# Patient Record
Sex: FEMALE | Race: WHITE | NOT HISPANIC OR LATINO | Employment: FULL TIME | URBAN - METROPOLITAN AREA
[De-identification: names, ages, dates, MRNs, and addresses within clinical notes are randomized per-mention and may not be internally consistent; named-entity substitution may affect disease eponyms.]

---

## 2017-01-03 ENCOUNTER — SLEEP STUDY (OUTPATIENT)
Dept: SLEEP MEDICINE | Facility: MEDICAL CENTER | Age: 47
End: 2017-01-03
Attending: NURSE PRACTITIONER
Payer: COMMERCIAL

## 2017-01-03 DIAGNOSIS — G47.31 CENTRAL SLEEP APNEA: ICD-10-CM

## 2017-01-03 DIAGNOSIS — G47.33 OSA (OBSTRUCTIVE SLEEP APNEA): ICD-10-CM

## 2017-01-03 PROCEDURE — 95811 POLYSOM 6/>YRS CPAP 4/> PARM: CPT | Performed by: INTERNAL MEDICINE

## 2017-01-03 NOTE — MR AVS SNAPSHOT
Fartun Mittal   1/3/2017 7:30 PM   Sleep Study   MRN: [id number]    Department:  [facility]   Dept Phone:  [phone]    Description:  Female : 1970   Provider:  SLEEP TECH           Allergies as of 1/3/2017     Allergen Noted Reactions    Asa [Aspirin] 2009   Anaphylaxis    Nsaids 2010   Anaphylaxis    Sulfa Drugs 2010   Anaphylaxis    Contrast Media With Iodine [Iodine] 2014   Itching      You were diagnosed with     CARI (obstructive sleep apnea)   [342661]       Central sleep apnea   [221914]         Vital Signs     Smoking Status                   Never Smoker            Basic Information     Date Of Birth Sex Race Ethnicity Preferred Language    1970 Female White Non- English      Your appointments     2017 10:40 AM   Established Patient Pul with KIMBERLY Jones   [facility] (--)    [address]  [address]  [facility] [id number] [phone]              Problem List              ICD-10-CM Priority Class Noted - Resolved    Anxiety F41.9   Unknown - Present    Ocular migraine G43.109   2012 - Present    Hyperlipidemia E78.5   2012 - Present    Menopause Z78.0   3/19/2014 - Present    GERD (gastroesophageal reflux disease) K21.9   3/19/2014 - Present    Aspirin-sensitive asthma with nasal polyps J45.909, J33.9, Z88.6   2016 - Present    Chronic sinusitis J32.9   2016 - Present    Apnea R06.81   2016 - Present    CARI (obstructive sleep apnea) G47.33   2016 - Present    Central sleep apnea G47.31   2016 - Present      Health Maintenance        Date Due Completion Dates    IMM PNEUMOCOCCAL 19-64 (ADULT) MEDIUM RISK SERIES (1 of 1 - PPSV23) 1988 ---    MAMMOGRAM 2015, 2013, 2012, 2011    PAP SMEAR 2017, 2011, 3/23/2010, 2009    IMM DTaP/Tdap/Td Vaccine (2 - Td) 2026            Current  Immunizations     Influenza TIV (IM) 11/12/2013    Influenza Vaccine Pediatric 11/18/2010 12:42 PM, 9/30/2009, 10/24/2008    Influenza Vaccine Quad Inj (Pf) 11/11/2016 11:36 AM    Tdap Vaccine 4/13/2016 12:01 PM    Tetanus Vaccine 2/18/1997      Below and/or attached are the medications your provider expects you to take. Review all of your home medications and newly ordered medications with your provider and/or pharmacist. Follow medication instructions as directed by your provider and/or pharmacist. Please keep your medication list with you and share with your provider. Update the information when medications are discontinued, doses are changed, or new medications (including over-the-counter products) are added; and carry medication information at all times in the event of emergency situations     Allergies:  ASA - Anaphylaxis     NSAIDS - Anaphylaxis     SULFA DRUGS - Anaphylaxis     CONTRAST MEDIA WITH IODINE - Itching               Medications  Valid as of: January 04, 2017 -  6:46 AM    Generic Name Brand Name Tablet Size Instructions for use    Atorvastatin Calcium (Tab) LIPITOR 20 MG Take 1 Tab by mouth every day.        EPINEPHrine HCl (Anaphylaxis) (Device) EPIPEN 0.3 MG/0.3ML (1:1000) 0.3 mL by Intramuscular route Once PRN for 1 dose.        Fluticasone Propionate (Suspension) FLONASE 50 MCG/ACT USE 1 SPRAYS IN EACH NOSTRIL DAILY-GENERIC FOR FLONASE (60 days)        Loratadine   Take  by mouth every day.        Montelukast Sodium (Tab) SINGULAIR 10 MG Take 1 Tab by mouth every day.        Omeprazole (CAPSULE DELAYED RELEASE) PRILOSEC 20 MG Take 1 Cap by mouth every day.        Zolpidem Tartrate (Tab) AMBIEN 5 MG Take 1-2 tablets by mouth every evening as needed for insomnia. Bring to sleep study.        .                 Medicines prescribed today were sent to:     [id number] - [place] - [address] [facility]    [address] [facility] [place] [address]    Phone: [phone] Fax: [phone]    Open 24 Hours?:  No      Medication refill instructions:       If your prescription bottle indicates you have medication refills left, it is not necessary to call your provider’s office. Please contact your pharmacy and they will refill your medication.    If your prescription bottle indicates you do not have any refills left, you may request refills at any time through one of the following ways: The online Fittr system (except Urgent Care), by calling your provider’s office, or by asking your pharmacy to contact your provider’s office with a refill request. Medication refills are processed only during regular business hours and may not be available until the next business day. Your provider may request additional information or to have a follow-up visit with you prior to refilling your medication.   *Please Note: Medication refills are assigned a new Rx number when refilled electronically. Your pharmacy may indicate that no refills were authorized even though a new prescription for the same medication is available at the pharmacy. Please request the medicine by name with the pharmacy before contacting your provider for a refill.           Fittr Access Code: Activation code not generated  Current Fittr Status: Active

## 2017-01-04 NOTE — PROCEDURES
Clinical Comments:  The patient underwent a comprehensive polysomnogram using the standard montage for measurement of parameters of sleep, respiratory events, movement abnormalities, heart rate and rhythm. A microphone was used to monitor snoring.      ANALYSIS:  The total recording time was 481.8 minutes with a sleep period of 475.8 minutes and the total sleep time was 450.8 minutes with a sleep efficiency of 93.6%.  The sleep latency was 6.1 minutes, and REM latency was 102.0 minutes.  The patient experienced 72 arousals in total, for an arousal index of 9.6    RESPIRATORY: The patient had 104 apneas in total.  Of these, 4 were obstructive apneas, and 100 were central apneas.  This resulted in an apnea index (AI) of 13.8.  The patient had 115 hypopneas, for a hypopnea index of 15.3.  The overall AHI was 29.2, while the AHI during Stage R sleep was 21.3.  AHI while supine was 33.0.    OXIMETRY: Oxygen saturation monitoring showed a mean SpO2 of 93.2%, with a minimum oxygen saturation of 71.0%.  Oxygen saturations were less than or = 89% for 32.4 minutes of sleep time.    CARDIAC: The highest heart rate during the recording was 93.0 beats per minute.  The average heart rate during sleep was 61.8 bpm.    LIMB MOVEMENTS: There were a total of 0 PLMs during sleep, of which 0 were PLMs arousals.  This resulted in a PLMS index of 0.0.      Interpretation:    Ms. Mittal presented with symptoms suggesting sleep apnea hypopnea syndrome. Previous polysomnography demonstrated an apnea hypopnea index of 120.6 events per hour with a lowest arterial oxygen saturation of 78% on room air. There was an incomplete response to airway pressurization in that previous study. This study was decided to further calibrate therapy.    There is minimal fragmentation of sleep with a normal sleep efficiency but a modest increase in the arousal and awakening index. No periodic limb movements are identified. BiPAP was adjusted across a  pressure range of 11-16/6-8 cm water with persistence of hypopnea and central apnea events. Overall there were 100 episodes of central apnea, 4 obstructive apneas and 115 episodes of hypopnea. Therefore servo adaptive BiPAP ventilation was initiated with a minimum expiratory pressure range of 8-10 cm water and pressure support. In the final pressure stage, the apnea hypopnea index was 1.8 events per hour and the lowest arterial oxygen saturation was 90% on room air. That stage in the titration included 18 minutes of REM sleep time, 49 minutes of non-REM sleep time, and was done in the supine body position.    Assessment: This study demonstrates an excellent response to servo adaptive BiPAP ventilation in a patient with previously identified severe sleep apnea hypopnea including a prominent component of central apnea resistant to CPAP and BiPAP therapy.    Recommendations: ASV with a minimum expiratory pressure of 10 cm water and pressure support of 5-15 cm water. She did best with a small Air Fit F10 full facemask and heated humidification.

## 2017-01-11 ENCOUNTER — OFFICE VISIT (OUTPATIENT)
Dept: PULMONOLOGY | Facility: HOSPICE | Age: 47
End: 2017-01-11
Payer: COMMERCIAL

## 2017-01-11 VITALS
RESPIRATION RATE: 16 BRPM | DIASTOLIC BLOOD PRESSURE: 98 MMHG | HEART RATE: 81 BPM | HEIGHT: 67 IN | BODY MASS INDEX: 30.61 KG/M2 | SYSTOLIC BLOOD PRESSURE: 136 MMHG | WEIGHT: 195 LBS | TEMPERATURE: 98.1 F | OXYGEN SATURATION: 96 %

## 2017-01-11 DIAGNOSIS — G47.31 CENTRAL SLEEP APNEA: ICD-10-CM

## 2017-01-11 PROCEDURE — 99213 OFFICE O/P EST LOW 20 MIN: CPT | Performed by: NURSE PRACTITIONER

## 2017-01-11 NOTE — PATIENT INSTRUCTIONS
1) Start ASV at 10/15/5cmH20  2) Discussed acclimating to machine and change mask within first 30 days if required. Bring machine to first appointment.  3) Vaccines: Up to date with flu  4) Return in about 6 weeks (around 2/22/2017) for Compliance, review of symptoms, if not sooner, follow up with KIM De Anda.

## 2017-01-11 NOTE — MR AVS SNAPSHOT
"        Fartun Mittal   2017 10:40 AM   Office Visit   MRN: [id number]    Department:  [facility]   Dept Phone:  [phone]    Description:  Female : 1970   Provider:  KIMBERLY Jones           Reason for Visit     Apnea Last seen     Results Sleep Study 1/3/17       Allergies as of 2017     Allergen Noted Reactions    Asa [Aspirin] 2009   Anaphylaxis    Nsaids 2010   Anaphylaxis    Sulfa Drugs 2010   Anaphylaxis    Contrast Media With Iodine [Iodine] 2014   Itching      You were diagnosed with     Central sleep apnea   [188862]         Vital Signs     Blood Pressure Pulse Temperature Respirations Height Weight    136/98 mmHg 81 36.7 °C (98.1 °F) 16 1.702 m (5' 7\") 88.451 kg (195 lb)    Body Mass Index Oxygen Saturation Smoking Status             30.53 kg/m2 96% Never Smoker          Basic Information     Date Of Birth Sex Race Ethnicity Preferred Language    1970 Female White Non- English      Your appointments     2017  1:00 PM   Established Patient with JUDD Barnett   [facility] [place] ([address])    [address] [place] [address] [address]  [facility] [address] [phone]           You will be receiving a confirmation call a few days before your appointment from our automated call confirmation system.   Please bring to your appointment; a photo ID, copies of your insurance card, all medication bottles and any co-pay that you are responsible for.  Please allow 45-60 minutes to complete your scheduled appointment.              Problem List              ICD-10-CM Priority Class Noted - Resolved    Anxiety F41.9   Unknown - Present    Ocular migraine G43.109   2012 - Present    Hyperlipidemia E78.5   2012 - Present    Menopause Z78.0   3/19/2014 - Present    GERD (gastroesophageal reflux disease) K21.9   3/19/2014 - Present    Aspirin-sensitive asthma with nasal polyps J45.909, J33.9, Z88.6   " 8/18/2016 - Present    Chronic sinusitis J32.9   11/11/2016 - Present    Apnea R06.81   11/11/2016 - Present    CARI (obstructive sleep apnea) G47.33   12/8/2016 - Present    Central sleep apnea G47.31   12/8/2016 - Present      Health Maintenance        Date Due Completion Dates    IMM PNEUMOCOCCAL 19-64 (ADULT) MEDIUM RISK SERIES (1 of 1 - PPSV23) 1/26/1988 ---    MAMMOGRAM 8/8/2015 8/8/2014, 8/23/2013, 8/17/2012, 8/16/2011    PAP SMEAR 7/31/2017 7/31/2014, 4/29/2011, 3/23/2010, 8/19/2009    IMM DTaP/Tdap/Td Vaccine (2 - Td) 4/13/2026 4/13/2016            Current Immunizations     Influenza TIV (IM) 11/12/2013    Influenza Vaccine Pediatric 11/18/2010 12:42 PM, 9/30/2009, 10/24/2008    Influenza Vaccine Quad Inj (Pf) 11/11/2016 11:36 AM    Tdap Vaccine 4/13/2016 12:01 PM    Tetanus Vaccine 2/18/1997      Below and/or attached are the medications your provider expects you to take. Review all of your home medications and newly ordered medications with your provider and/or pharmacist. Follow medication instructions as directed by your provider and/or pharmacist. Please keep your medication list with you and share with your provider. Update the information when medications are discontinued, doses are changed, or new medications (including over-the-counter products) are added; and carry medication information at all times in the event of emergency situations     Allergies:  ASA - Anaphylaxis     NSAIDS - Anaphylaxis     SULFA DRUGS - Anaphylaxis     CONTRAST MEDIA WITH IODINE - Itching               Medications  Valid as of: January 11, 2017 - 11:04 AM    Generic Name Brand Name Tablet Size Instructions for use    Atorvastatin Calcium (Tab) LIPITOR 20 MG Take 1 Tab by mouth every day.        EPINEPHrine HCl (Anaphylaxis) (Device) EPIPEN 0.3 MG/0.3ML (1:1000) 0.3 mL by Intramuscular route Once PRN for 1 dose.        Fluticasone Propionate (Suspension) FLONASE 50 MCG/ACT USE 1 SPRAYS IN EACH NOSTRIL DAILY-GENERIC FOR  FLONASE (60 days)        Loratadine   Take  by mouth every day.        Montelukast Sodium (Tab) SINGULAIR 10 MG Take 1 Tab by mouth every day.        Omeprazole (CAPSULE DELAYED RELEASE) PRILOSEC 20 MG Take 1 Cap by mouth every day.        Zolpidem Tartrate (Tab) AMBIEN 5 MG Take 1-2 tablets by mouth every evening as needed for insomnia. Bring to sleep study.        .                 Medicines prescribed today were sent to:     [facility] [id number] [facility] [place] - [address] [facility]    [address] [facility] [place] [address]    Phone: [phone] Fax: [phone]    Open 24 Hours?: No      Medication refill instructions:       If your prescription bottle indicates you have medication refills left, it is not necessary to call your provider’s office. Please contact your pharmacy and they will refill your medication.    If your prescription bottle indicates you do not have any refills left, you may request refills at any time through one of the following ways: The online Solaiemes system (except Urgent Care), by calling your provider’s office, or by asking your pharmacy to contact your provider’s office with a refill request. Medication refills are processed only during regular business hours and may not be available until the next business day. Your provider may request additional information or to have a follow-up visit with you prior to refilling your medication.   *Please Note: Medication refills are assigned a new Rx number when refilled electronically. Your pharmacy may indicate that no refills were authorized even though a new prescription for the same medication is available at the pharmacy. Please request the medicine by name with the pharmacy before contacting your provider for a refill.        Instructions    1) Start ASV at 10/15/5cmH20  2) Discussed acclimating to machine and change mask within first 30 days if required. Bring machine to first appointment.  3) Vaccines: Up to date with flu  4) Return in about 6 weeks  (around 2/22/2017) for Compliance, review of symptoms, if not sooner, follow up with KIM De Anda.              MyChart Access Code: Activation code not generated  Current Scutumt Status: Active

## 2017-01-11 NOTE — PROGRESS NOTES
"CC:  Here for f/u sleep issues as listed below    HPI:   Fartun presents today for follow up obstructive sleep apnea, central sleep apnea and titration results.  PSG from 12/2016 indicated an AHI of 120.6 and low oxygen of 78%. During the treatment phase a total of 83 episodes of central apneas were noted. A titration sleep study and was completed 1-20 17. On BiPAP pressures she continued to have central apneas. She was switched to the ASV machine with success at a EPAP pressure of 10 with reduced AHI, full correction of low oxygen, and REM rebound. Patient felt remarkably more refreshed and less tired throughout the day after the sleep study. She did not wake up during the night that she typically does. She is very excited to get started on the machine as soon as possible.    Patient is currently sleeping 8 hours per night with every hour nighttime awakenings. Apparently she has been sleeping in a recliner since a friend that is a physician assistant slept over at her home not too long ago and discussed her concerns about sleep apnea. They have no trouble falling asleep.   They do not feel refreshed in the morning and deny morning H/A. They feel tired throughout the day, but do not nap. She has 3 jobs. She could potentially take 2-3 naps a day if she wanted though.  Patient reports loud snoring, apnea events and resuscitative orthopneic events. They get tired after driving for one hour.  She is a 5th . She will catch herself dozing off while reading. She feels physically well, but has hx of HTN and CAD \"for unknown reasons\". No fam hx of CAD.    Patient Active Problem List    Diagnosis Date Noted   • CARI (obstructive sleep apnea) 12/08/2016   • Central sleep apnea 12/08/2016   • Chronic sinusitis 11/11/2016   • Apnea 11/11/2016   • Aspirin-sensitive asthma with nasal polyps 08/18/2016   • Menopause 03/19/2014   • GERD (gastroesophageal reflux disease) 03/19/2014   • Ocular migraine 01/18/2012 "   • Hyperlipidemia 01/18/2012   • Anxiety        Past Medical History   Diagnosis Date   • Allergy    • Anxiety    • Tubal ligation    • Heart burn    • Snoring      sleep apnea questionairre completed   • Psychiatric problem      depression   • Allergic rhinitis    • Sleep apnea    • Chickenpox    • Influenza        Past Surgical History   Procedure Laterality Date   • Tonsillectomy  1981   • Appendectomy  3/2003   • Tubal coagulation laparoscopic bilateral  2007   • Abdominoplasty  2009   • Foot orif  1988     left   • Bunionectomy  5/18/2011     Performed by MOLLY GARCIA at [facility]   • Cheilectomy  5/18/2011     Performed by MOLLY GARCIA at [facility]   • Bone spur excision  5/18/2011     Performed by MOLLY GARCIA at [facility]   • Eswt  5/18/2011     Performed by MOLLY GARCIA at [facility]       Family History   Problem Relation Age of Onset   • Cancer Father      prostate   • Hypertension Father    • Arthritis Father    • Prostate cancer Father    • Cancer Sister      melanoma   • Cancer Paternal Aunt      breast   • Diabetes     • Sleep Apnea Neg Hx        Social History     Social History   • Marital Status:      Spouse Name: N/A   • Number of Children: N/A   • Years of Education: N/A     Occupational History   • Not on file.     Social History Main Topics   • Smoking status: Never Smoker    • Smokeless tobacco: Never Used   • Alcohol Use: 4.2 oz/week     7 Glasses of wine per week      Comment: once a day   • Drug Use: No   • Sexual Activity: Not on file      Comment: , 2 children, teacher     Other Topics Concern   • Not on file     Social History Narrative       Current Outpatient Prescriptions   Medication Sig Dispense Refill   • atorvastatin (LIPITOR) 20 MG Tab Take 1 Tab by mouth every day. 30 Tab 2   • montelukast (SINGULAIR) 10 MG Tab Take 1 Tab by mouth every day. 30 Tab 11   • omeprazole (PRILOSEC) 20 MG  "delayed-release capsule Take 1 Cap by mouth every day. 30 Cap 5   • epinephrine (EPIPEN) 0.3 MG/0.3ML (1:1000) injection 0.3 mL by Intramuscular route Once PRN for 1 dose. 1 Each 3   • zolpidem (AMBIEN) 5 MG Tab Take 1-2 tablets by mouth every evening as needed for insomnia. Bring to sleep study. (Patient not taking: Reported on 1/11/2017) 3 Tab 0   • fluticasone (FLONASE) 50 MCG/ACT nasal spray USE 1 SPRAYS IN EACH NOSTRIL DAILY-GENERIC FOR FLONASE (60 days) (Patient not taking: Reported on 1/11/2017) 16 g 4   • Loratadine (KS ALLERCLEAR PO) Take  by mouth every day.       No current facility-administered medications for this visit.          Allergies: Asa; Nsaids; Sulfa drugs; and Contrast media with iodine      ROS   Gen: Denies fever, chills, unintentional weight loss, fatigue  Resp:Denies Dyspnea  CV: Denies chest pain, chest tightness  Sleep:see hpi  Neuro: Denies frequent headaches, weakness, dizziness  See HPI.  All other systems reviewed and negative        Vital signs for this encounter:  Filed Vitals:    01/11/17 1035   Height: 1.702 m (5' 7\")   Weight: 88.451 kg (195 lb)   Weight % change since last entry.: 0 %   BP: 136/98   Pulse: 81   BMI (Calculated): 30.54   Resp: 16   Temp: 36.7 °C (98.1 °F)                   Physical Exam:   Gen:         Alert and oriented, No apparent distress.   Neck:        No Lymphadenopathy.  Lungs:     Clear to auscultation bilaterally.    CV:          Regular rate and rhythm. No murmurs, rubs or gallops.   Abd:         Soft non tender, non distended.            Ext:          No clubbing, cyanosis, edema.    Assessment   1. Central sleep apnea  DME ASV    CANCELED: DME ASV       PLAN:   Patient Instructions   1) Start ASV at 10/15/5cmH20 given the severity I placed order for STAT  2) Discussed acclimating to machine and change mask within first 30 days if required. Bring machine to first appointment.  3) Vaccines: Up to date with flu  4) Return in about 6 weeks (around " 2/22/2017) for Compliance, review of symptoms, if not sooner, follow up with KIM De Anda.

## 2017-01-25 ENCOUNTER — APPOINTMENT (OUTPATIENT)
Dept: MEDICAL GROUP | Facility: MEDICAL CENTER | Age: 47
End: 2017-01-25
Payer: COMMERCIAL

## 2017-02-02 ENCOUNTER — TELEPHONE (OUTPATIENT)
Dept: PULMONOLOGY | Facility: HOSPICE | Age: 47
End: 2017-02-02

## 2017-02-02 DIAGNOSIS — G47.31 CENTRAL SLEEP APNEA: ICD-10-CM

## 2017-02-02 NOTE — TELEPHONE ENCOUNTER
1. Caller Name: Fartun                                          Call Back Number: [phone]        Patient approves a detailed voicemail message: yes    Patient stated her machine has to much pressure, and mask is leaking stated she had tighten the straps but it leaves marks in the morning. Would like to also inform us that she does have the nasal mask and will began to use it in a week. Pt stated she wants to try both to determine which one works best. Please advise patient is currently on ASV 10/15/5 cm H20, last OV 1/11/17 KIM Cameron, next ov 4/13/17 KIM Cameron

## 2017-02-03 NOTE — TELEPHONE ENCOUNTER
Order to decrease pressure. Then patient needs an OV to review compliance card. Please get her scheduled in the next 3 weeks

## 2017-02-03 NOTE — TELEPHONE ENCOUNTER
Order, demo, last OV faxed to DME:  [facility] / ph [phone] / fax [phone]  Left message advised order was faxed to key to decrease pressure on CPAP. Informed in message, needs to follow up with us with in 3 weeks as advised from KIM Rodriguez, I informed patient can call me directly to schedule follow up and will need to bring in machine with chip.

## 2017-03-22 ENCOUNTER — SLEEP CENTER VISIT (OUTPATIENT)
Dept: SLEEP MEDICINE | Facility: MEDICAL CENTER | Age: 47
End: 2017-03-22
Payer: COMMERCIAL

## 2017-03-22 VITALS
RESPIRATION RATE: 16 BRPM | HEART RATE: 60 BPM | HEIGHT: 67 IN | SYSTOLIC BLOOD PRESSURE: 122 MMHG | DIASTOLIC BLOOD PRESSURE: 76 MMHG | OXYGEN SATURATION: 98 % | TEMPERATURE: 98.1 F | BODY MASS INDEX: 30.61 KG/M2 | WEIGHT: 195 LBS

## 2017-03-22 DIAGNOSIS — G47.33 OSA (OBSTRUCTIVE SLEEP APNEA): ICD-10-CM

## 2017-03-22 DIAGNOSIS — G47.31 CENTRAL SLEEP APNEA: ICD-10-CM

## 2017-03-22 PROCEDURE — 99213 OFFICE O/P EST LOW 20 MIN: CPT | Performed by: NURSE PRACTITIONER

## 2017-03-22 NOTE — PROGRESS NOTES
CC:  Here for f/u sleep issues as listed below    HPI:   Fartun presents today for follow up obstructive sleep apnea, central sleep apnea.  PSG from 12/2016 indicated an AHI of 120.6 and low oxygen of 78%. A titration sleep study and was completed 1-2017 due to central apneas. She had successful study on ASV machine EPAP pressure of 10 with reduced AHI, full correction of low oxygen, and REM rebound.   Currently she is being treated with ASV @ EPAP of 8 with PS cmH20.  Compliance download from the dates 2/20/2017 - 3/21/2017 indicates she is wearing the device 100% for an avg of 7 hours and 10 minutes per night with a reduced AHI of 2.8.  She does tolerate pressure and mask well.  She loves her machine! She wakes up refreshed and denies morning H/A. She has belching in the morning 3-4x/week. she sleeps better overall. She is amazed how much better she feels. Her BP has decreased. She is hoping her cholesterol also improves as time goes on.  she will continue to clean supplies weekly and change them as insurance allows. 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       Patient Active Problem List    Diagnosis Date Noted   • CARI (obstructive sleep apnea) 12/08/2016   • Central sleep apnea 12/08/2016   • Chronic sinusitis 11/11/2016   • Apnea 11/11/2016   • Aspirin-sensitive asthma with nasal polyps 08/18/2016   • Menopause 03/19/2014   • GERD (gastroesophageal reflux disease) 03/19/2014   • Ocular migraine 01/18/2012   • Hyperlipidemia 01/18/2012   • Anxiety        Past Medical History   Diagnosis Date   • Allergy    • Anxiety    • Tubal ligation    • Heart burn    • Snoring      sleep apnea questionairre completed   • Psychiatric problem      depression   • Allergic rhinitis    • Sleep apnea    • Chickenpox    • Influenza        Past Surgical History   Procedure Laterality Date   • Tonsillectomy  1981   • Appendectomy  3/2003   • Tubal coagulation laparoscopic bilateral  2007   •  Abdominoplasty  2009   • Foot orif  1988     left   • Bunionectomy  5/18/2011     Performed by MOLLY GARCIA at [facility]   • Cheilectomy  5/18/2011     Performed by MOLLY GARCIA at [facility]   • Bone spur excision  5/18/2011     Performed by MOLLY GARCIA at [facility]   • Eswt  5/18/2011     Performed by MOLLY GARCIA at [facility]       Family History   Problem Relation Age of Onset   • Cancer Father      prostate   • Hypertension Father    • Arthritis Father    • Prostate cancer Father    • Cancer Sister      melanoma   • Cancer Paternal Aunt      breast   • Diabetes     • Sleep Apnea Neg Hx        Social History     Social History   • Marital Status:      Spouse Name: N/A   • Number of Children: N/A   • Years of Education: N/A     Occupational History   • Not on file.     Social History Main Topics   • Smoking status: Never Smoker    • Smokeless tobacco: Never Used   • Alcohol Use: 4.2 oz/week     7 Glasses of wine per week      Comment: once a day   • Drug Use: No   • Sexual Activity: Not on file      Comment: , 2 children, teacher     Other Topics Concern   • Not on file     Social History Narrative       Current Outpatient Prescriptions   Medication Sig Dispense Refill   • atorvastatin (LIPITOR) 20 MG Tab Take 1 Tab by mouth every day. 30 Tab 2   • montelukast (SINGULAIR) 10 MG Tab Take 1 Tab by mouth every day. 30 Tab 11   • omeprazole (PRILOSEC) 20 MG delayed-release capsule Take 1 Cap by mouth every day. 30 Cap 5   • epinephrine (EPIPEN) 0.3 MG/0.3ML (1:1000) injection 0.3 mL by Intramuscular route Once PRN for 1 dose. 1 Each 3   • Loratadine (KS ALLERCLEAR PO) Take  by mouth every day.       No current facility-administered medications for this visit.          Allergies: Asa; Nsaids; Sulfa drugs; and Contrast media with iodine      ROS   Gen: Denies fever, chills, unintentional weight loss, fatigue  Resp:Denies  "Dyspnea  CV: Denies chest pain, chest tightness  Sleep:Denies morning headache, insomnia, daytime somnolence, snoring, gasping for air, apnea  Neuro: Denies frequent headaches, weakness, dizziness  See HPI.  All other systems reviewed and negative        Vital signs for this encounter:  Filed Vitals:    03/22/17 1019   Height: 1.702 m (5' 7.01\")   Weight: 88.451 kg (195 lb)   Weight % change since last entry.: 0 %   BP: 122/76   Pulse: 60   BMI (Calculated): 30.53   Resp: 16   Temp: 36.7 °C (98.1 °F)   O2 sat % room air: 98 %                   Physical Exam:   Gen:         Alert and oriented, No apparent distress.   Neck:        No Lymphadenopathy.  Lungs:     Clear to auscultation bilaterally.    CV:          Regular rate and rhythm. No murmurs, rubs or gallops.   Abd:         Soft non tender, non distended.            Ext:          No clubbing, cyanosis, edema.    Assessment   1. CARI (obstructive sleep apnea)  DME OTHER   2. Central sleep apnea         PLAN:   Patient Instructions   1) Continue ASV and decrease to 7cmH20 to help overcome gas   2) Clean mask and supplies weekly and change them as insurance allows  3) Vaccines: Up to date with flu  4) Return in about 3 months (around 6/22/2017) for Compliance, review of symptoms, if not sooner, follow up with KIM De Anda.          "

## 2017-03-22 NOTE — MR AVS SNAPSHOT
"Fartun Mittal   3/22/2017 10:20 AM   Sleep Center Visit   MRN: [id number]    Department:  [facility]   Dept Phone:  [phone]    Description:  Female : 1970   Provider:  KIMBERLY Jones           Reason for Visit     Apnea Last seen 17       Allergies as of 3/22/2017     Allergen Noted Reactions    Asa [Aspirin] 2009   Anaphylaxis    Nsaids 2010   Anaphylaxis    Sulfa Drugs 2010   Anaphylaxis    Contrast Media With Iodine [Iodine] 2014   Itching      You were diagnosed with     CARI (obstructive sleep apnea)   [260317]         Vital Signs     Blood Pressure Pulse Temperature Respirations Height Weight    122/76 mmHg 60 36.7 °C (98.1 °F) 16 1.702 m (5' 7.01\") 88.451 kg (195 lb)    Body Mass Index Oxygen Saturation Smoking Status             30.53 kg/m2 98% Never Smoker          Basic Information     Date Of Birth Sex Race Ethnicity Preferred Language    1970 Female White Non- English      Problem List              ICD-10-CM Priority Class Noted - Resolved    Anxiety F41.9   Unknown - Present    Ocular migraine G43.109   2012 - Present    Hyperlipidemia E78.5   2012 - Present    Menopause Z78.0   3/19/2014 - Present    GERD (gastroesophageal reflux disease) K21.9   3/19/2014 - Present    Aspirin-sensitive asthma with nasal polyps J45.909, J33.9, Z88.6   2016 - Present    Chronic sinusitis J32.9   2016 - Present    Apnea R06.81   2016 - Present    CARI (obstructive sleep apnea) G47.33   2016 - Present    Central sleep apnea G47.31   2016 - Present      Health Maintenance        Date Due Completion Dates    IMM PNEUMOCOCCAL 19-64 (ADULT) MEDIUM RISK SERIES (1 of 1 - PPSV23) 1988 ---    MAMMOGRAM 2015, 2013, 2012, 2011    PAP SMEAR 2017, 2011, 3/23/2010, 2009    IMM DTaP/Tdap/Td Vaccine (2 - Td) 2026            Current " Immunizations     Influenza TIV (IM) 11/12/2013    Influenza Vaccine Pediatric 11/18/2010 12:42 PM, 9/30/2009, 10/24/2008    Influenza Vaccine Quad Inj (Pf) 11/11/2016 11:36 AM    Tdap Vaccine 4/13/2016 12:01 PM    Tetanus Vaccine 2/18/1997      Below and/or attached are the medications your provider expects you to take. Review all of your home medications and newly ordered medications with your provider and/or pharmacist. Follow medication instructions as directed by your provider and/or pharmacist. Please keep your medication list with you and share with your provider. Update the information when medications are discontinued, doses are changed, or new medications (including over-the-counter products) are added; and carry medication information at all times in the event of emergency situations     Allergies:  ASA - Anaphylaxis     NSAIDS - Anaphylaxis     SULFA DRUGS - Anaphylaxis     CONTRAST MEDIA WITH IODINE - Itching               Medications  Valid as of: March 22, 2017 - 11:06 AM    Generic Name Brand Name Tablet Size Instructions for use    Atorvastatin Calcium (Tab) LIPITOR 20 MG Take 1 Tab by mouth every day.        EPINEPHrine HCl (Anaphylaxis) (Device) EPIPEN 0.3 MG/0.3ML (1:1000) 0.3 mL by Intramuscular route Once PRN for 1 dose.        Loratadine   Take  by mouth every day.        Montelukast Sodium (Tab) SINGULAIR 10 MG Take 1 Tab by mouth every day.        Omeprazole (CAPSULE DELAYED RELEASE) PRILOSEC 20 MG Take 1 Cap by mouth every day.        .                 Medicines prescribed today were sent to:     [id number]  [place] - [address] [facility]    [address] [facility] [address]    Phone: [phone] Fax: [phone]    Open 24 Hours?: No      Medication refill instructions:       If your prescription bottle indicates you have medication refills left, it is not necessary to call your provider’s office. Please contact your pharmacy and they will refill your medication.    If your  prescription bottle indicates you do not have any refills left, you may request refills at any time through one of the following ways: The online [employer] system (except Urgent Care), by calling your provider’s office, or by asking your pharmacy to contact your provider’s office with a refill request. Medication refills are processed only during regular business hours and may not be available until the next business day. Your provider may request additional information or to have a follow-up visit with you prior to refilling your medication.   *Please Note: Medication refills are assigned a new Rx number when refilled electronically. Your pharmacy may indicate that no refills were authorized even though a new prescription for the same medication is available at the pharmacy. Please request the medicine by name with the pharmacy before contacting your provider for a refill.        Instructions    1) Continue ASV and decrease to 7cmH20 to help overcome gas   2) Clean mask and supplies weekly and change them as insurance allows  3) Vaccines: Up to date with flu  4) Return in about 3 months (around 6/22/2017) for Compliance, review of symptoms, if not sooner, follow up with KIM De Anda.              [employer] [facility] Access Code: Activation code not generated  Current [employer] [facility] Status: Active

## 2017-03-22 NOTE — PATIENT INSTRUCTIONS
1) Continue ASV and decrease to 7cmH20 to help overcome gas   2) Clean mask and supplies weekly and change them as insurance allows  3) Vaccines: Up to date with flu  4) Return in about 3 months (around 6/22/2017) for Compliance, review of symptoms, if not sooner, follow up with KIM De Anda.

## 2017-04-25 RX ORDER — ATORVASTATIN CALCIUM 20 MG/1
20 TABLET, FILM COATED ORAL DAILY
Qty: 30 TAB | Refills: 2 | Status: SHIPPED | OUTPATIENT
Start: 2017-04-25 | End: 2018-03-02 | Stop reason: SDUPTHER

## 2017-05-30 DIAGNOSIS — K21.9 GASTROESOPHAGEAL REFLUX DISEASE WITHOUT ESOPHAGITIS: ICD-10-CM

## 2017-05-30 RX ORDER — OMEPRAZOLE 20 MG/1
20 CAPSULE, DELAYED RELEASE ORAL DAILY
Qty: 30 CAP | Refills: 5 | Status: SHIPPED | OUTPATIENT
Start: 2017-05-30 | End: 2018-03-26 | Stop reason: SDUPTHER

## 2017-05-30 NOTE — TELEPHONE ENCOUNTER
Was the patient seen in the last year in this department? Yes     Does patient have an active prescription for medications requested? No     Received Request Via: Pharmacy     Last visit:11/11/16

## 2017-06-15 ENCOUNTER — NON-PROVIDER VISIT (OUTPATIENT)
Dept: MEDICAL GROUP | Facility: LAB | Age: 47
End: 2017-06-15
Payer: COMMERCIAL

## 2017-06-15 VITALS — DIASTOLIC BLOOD PRESSURE: 82 MMHG | SYSTOLIC BLOOD PRESSURE: 118 MMHG

## 2017-06-15 DIAGNOSIS — J30.2 SEASONAL ALLERGIC RHINITIS, UNSPECIFIED ALLERGIC RHINITIS TRIGGER: ICD-10-CM

## 2017-06-15 RX ORDER — TRIAMCINOLONE ACETONIDE 40 MG/ML
40 INJECTION, SUSPENSION INTRA-ARTICULAR; INTRAMUSCULAR ONCE
Status: COMPLETED | OUTPATIENT
Start: 2017-06-15 | End: 2017-06-15

## 2017-06-15 RX ADMIN — TRIAMCINOLONE ACETONIDE 40 MG: 40 INJECTION, SUSPENSION INTRA-ARTICULAR; INTRAMUSCULAR at 15:46

## 2017-06-15 NOTE — PROGRESS NOTES
I have placed the below orders and discussed them with Dr. Abad. the MA is performing the below orders under the direction of Dr. Abad

## 2017-06-15 NOTE — MR AVS SNAPSHOT
Fartun Mittal   6/15/2017 3:00 PM   Non-Provider Visit   MRN: [id number]    Department:  [facility]   Dept Phone:  [phone]    Description:  Female : 1970   Provider:  STEPHANIE HOLDER MA           Allergies as of 6/15/2017     Allergen Noted Reactions    Asa [Aspirin] 2009   Anaphylaxis    Nsaids 2010   Anaphylaxis    Sulfa Drugs 2010   Anaphylaxis    Contrast Media With Iodine [Iodine] 2014   Itching      You were diagnosed with     Seasonal allergic rhinitis, unspecified allergic rhinitis trigger   [[id number]]         Vital Signs     Blood Pressure Smoking Status                118/82 mmHg Never Smoker           Basic Information     Date Of Birth Sex Race Ethnicity Preferred Language    1970 Female White Non- English      Your appointments     2017  2:20 PM   Established Patient with JUDD Barnett   [facility] (--)    [address] [id number] [phone]           You will be receiving a confirmation call a few days before your appointment from our automated call confirmation system.              Problem List              ICD-10-CM Priority Class Noted - Resolved    Anxiety F41.9   Unknown - Present    Ocular migraine G43.109   2012 - Present    Hyperlipidemia E78.5   2012 - Present    Menopause Z78.0   3/19/2014 - Present    GERD (gastroesophageal reflux disease) K21.9   3/19/2014 - Present    Aspirin-sensitive asthma with nasal polyps J45.909, J33.9, Z88.6   2016 - Present    Chronic sinusitis J32.9   2016 - Present    Apnea R06.81   2016 - Present    CARI (obstructive sleep apnea) G47.33   2016 - Present    Central sleep apnea G47.31   2016 - Present      Health Maintenance        Date Due Completion Dates    IMM PNEUMOCOCCAL 19-64 (ADULT) MEDIUM RISK SERIES (1 of 1 - PPSV23) 1988 ---    MAMMOGRAM 2015, 2013,  8/17/2012, 8/16/2011    PAP SMEAR 7/31/2017 7/31/2014, 4/29/2011, 3/23/2010, 8/19/2009    IMM DTaP/Tdap/Td Vaccine (2 - Td) 4/13/2026 4/13/2016            Current Immunizations     Influenza TIV (IM) 11/12/2013    Influenza Vaccine Pediatric 11/18/2010 12:42 PM, 9/30/2009, 10/24/2008    Influenza Vaccine Quad Inj (Pf) 11/11/2016 11:36 AM    Tdap Vaccine 4/13/2016 12:01 PM    Tetanus Vaccine 2/18/1997      Below and/or attached are the medications your provider expects you to take. Review all of your home medications and newly ordered medications with your provider and/or pharmacist. Follow medication instructions as directed by your provider and/or pharmacist. Please keep your medication list with you and share with your provider. Update the information when medications are discontinued, doses are changed, or new medications (including over-the-counter products) are added; and carry medication information at all times in the event of emergency situations     Allergies:  ASA - Anaphylaxis     NSAIDS - Anaphylaxis     SULFA DRUGS - Anaphylaxis     CONTRAST MEDIA WITH IODINE - Itching               Medications  Valid as of: Maia 15, 2017 -  3:56 PM    Generic Name Brand Name Tablet Size Instructions for use    Atorvastatin Calcium (Tab) LIPITOR 20 MG Take 1 Tab by mouth every day.        EPINEPHrine HCl (Anaphylaxis) (Device) EPIPEN 0.3 MG/0.3ML (1:1000) 0.3 mL by Intramuscular route Once PRN for 1 dose.        Loratadine   Take  by mouth every day.        Montelukast Sodium (Tab) SINGULAIR 10 MG Take 1 Tab by mouth every day.        Omeprazole (CAPSULE DELAYED RELEASE) PRILOSEC 20 MG Take 1 Cap by mouth every day.        .                 Medicines prescribed today were sent to:     [facility] [place] - [address] [facility] [place]    [address] [facility] [place] [address]    Phone: [phone] Fax: [phone]    Open 24 Hours?: No      Medication refill instructions:       If your prescription bottle indicates you have  medication refills left, it is not necessary to call your provider’s office. Please contact your pharmacy and they will refill your medication.    If your prescription bottle indicates you do not have any refills left, you may request refills at any time through one of the following ways: The online [employer] system (except Urgent Care), by calling your provider’s office, or by asking your pharmacy to contact your provider’s office with a refill request. Medication refills are processed only during regular business hours and may not be available until the next business day. Your provider may request additional information or to have a follow-up visit with you prior to refilling your medication.   *Please Note: Medication refills are assigned a new Rx number when refilled electronically. Your pharmacy may indicate that no refills were authorized even though a new prescription for the same medication is available at the pharmacy. Please request the medicine by name with the pharmacy before contacting your provider for a refill.           [employer] Access Code: Activation code not generated  Current [employer] Status: Active

## 2017-06-16 NOTE — NON-PROVIDER
Fartun Mittal is a 47 y.o. female here for a non-provider visit for kenolog injection.    Reason for injection: chronic sinusitis  Order in MAR?: Yes  Patient supplied?:No  Minimum interval has been met for this injection (per MAR order): Yes    Order and dose verified by: TESSY  Patient tolerated injection and no adverse effects were observed or reported: Yes    # of Administrations remaining in MAR: 0

## 2017-06-19 ENCOUNTER — OFFICE VISIT (OUTPATIENT)
Dept: MEDICAL GROUP | Facility: LAB | Age: 47
End: 2017-06-19
Payer: COMMERCIAL

## 2017-06-19 VITALS
SYSTOLIC BLOOD PRESSURE: 118 MMHG | HEART RATE: 71 BPM | RESPIRATION RATE: 12 BRPM | BODY MASS INDEX: 29.66 KG/M2 | HEIGHT: 67 IN | WEIGHT: 189 LBS | OXYGEN SATURATION: 97 % | DIASTOLIC BLOOD PRESSURE: 82 MMHG | TEMPERATURE: 98.5 F

## 2017-06-19 DIAGNOSIS — M25.50 MULTIPLE JOINT PAIN: ICD-10-CM

## 2017-06-19 DIAGNOSIS — Z00.00 PREVENTATIVE HEALTH CARE: ICD-10-CM

## 2017-06-19 DIAGNOSIS — Z88.6 ALLERGY TO NSAIDS: ICD-10-CM

## 2017-06-19 DIAGNOSIS — Z12.31 ENCOUNTER FOR SCREENING MAMMOGRAM FOR BREAST CANCER: ICD-10-CM

## 2017-06-19 DIAGNOSIS — G47.31 CENTRAL SLEEP APNEA: ICD-10-CM

## 2017-06-19 PROCEDURE — 99214 OFFICE O/P EST MOD 30 MIN: CPT | Performed by: NURSE PRACTITIONER

## 2017-06-19 RX ORDER — TIZANIDINE 4 MG/1
4 TABLET ORAL NIGHTLY PRN
Qty: 30 TAB | Refills: 1 | Status: SHIPPED | OUTPATIENT
Start: 2017-06-19 | End: 2017-10-09 | Stop reason: SDUPTHER

## 2017-06-19 NOTE — PROGRESS NOTES
"Chief Complaint   Patient presents with   • Other     pt states she is having issues with joint pain throughout her body, it is worse when she wake up in am    • Apnea     pt states she wants to discuss her recent dx of sleep apnea        CAMERON Rey is a 47-year-old established female here with complaint of new onset achy joints. She describes achy joints her hands, knees, feet, shoulder, elbows for the past 2-3 months.  Pain is worse in the morning. Unable to take NSAIDs because of an anaphylactic reaction she had multiple years ago. She is interested in any medication that could help her with her joint pain as well as a referral to an allergist for potential desensitization to NSAIDs. She did speak with her father who also had similar joint pain but has tested negative for rheumatoid arthritis or other autoimmune disease in the past. She has not noticed any redness or swelling to her joints. Her joints are not daily basis. A hot tub has been helpful for her joint pain.  Newly started playing softball 2 mo ago.  Playing twice a week and riding bike 3-4 x per week.      She was also recently diagnosed with central and obstructive apnea. She is doing excellent on a CPAP machine, feels much more awake during the day and is losing weight.    Past medical, surgical, family, and social history is reviewed and updated in Epic chart by me today.   Medications and allergies reviewed and updated in Epic chart by me today.     ROS:   As documented in history of present illness above    Exam:  Blood pressure 118/82, pulse 71, temperature 36.9 °C (98.5 °F), resp. rate 12, height 1.702 m (5' 7\"), weight 85.73 kg (189 lb), SpO2 97 %.  Constitutional: Alert, no distress, plus 3 vital signs  Skin:  Warm, dry, no rashes invisible areas  Eye: Equal, round and reactive, conjunctiva clear  ENMT: Lips without lesions, good dentition, oropharynx clear    Neck: Trachea midline, no masses, no thyromegaly  Respiratory: Unlabored respiration, " lungs clear to auscultation, no wheezes, no rhonchi  Cardiovascular: Normal rate and rhythm, no murmur, no edema  Musculoskeletal: No obvious swelling to any of her joints, including no redness or increased warmth to her joints. Full range of motion all joints.  Psych: Alert, pleasant, well-groomed, normal affect    A/P:  1. Allergy to NSAIDs  - REFERRAL TO ALLERGY    2. Multiple joint pain  -Because a hot tub was helpful for her (not at her house unfortunately), suggested trying a muscle relaxer prior to bedtime for the next week, discontinuing it if not helpful. Discussed appropriate quantities of Tylenol. Discussed topical treatment such as heating pads. Encouraged stretching exercises.  - tizanidine (ZANAFLEX) 4 MG Tab; Take 1 Tab by mouth at bedtime as needed (joint pain / muscle tightness).  Dispense: 30 Tab; Refill: 1    3. Central sleep apnea  -Sounds like this is going excellent for her. Recommend updated lab work which I discussed with the patient will likely show improved cholesterol and blood sugar after sleep apnea tx x the past 6 mo.    4. Preventative health care  -Recommend lab work prior to a full physical exam in a month.  - COMP METABOLIC PANEL; Future  - CBC WITH DIFFERENTIAL; Future  - LIPID PROFILE; Future  - HEMOGLOBIN A1C; Future  - HIV ANTIBODIES; Future  - T.PALLIDUM AB EIA; Future  - HEP C VIRUS ANTIBODY; Future    5. Encounter for screening mammogram for breast cancer  -Recommend updated mammogram  - MA-SCREEN MAMMO W/CAD-BILAT; Future

## 2017-06-19 NOTE — MR AVS SNAPSHOT
"        Fartun Mittal   2017 2:20 PM   Office Visit   MRN: [id number]    Department:  [facility]   Dept Phone:  [phone]    Description:  Female : 1970   Provider:  JUDD Barnett           Reason for Visit     Other pt states she is having issues with joint pain throughout her body, it is worse when she wake up in am     Apnea pt states she wants to discuss her recent dx of sleep apnea       Allergies as of 2017     Allergen Noted Reactions    Asa [Aspirin] 2009   Anaphylaxis    Nsaids 2010   Anaphylaxis    Sulfa Drugs 2010   Anaphylaxis    Contrast Media With Iodine [Iodine] 2014   Itching      You were diagnosed with     Allergy to NSAIDs   [971856]       Multiple joint pain   [330126]       Central sleep apnea   [163614]       Preventative health care   [658700]       Encounter for screening mammogram for breast cancer   [0168586]         Vital Signs     Blood Pressure Pulse Temperature Respirations Height Weight    118/82 mmHg 71 36.9 °C (98.5 °F) 12 1.702 m (5' 7\") 85.73 kg (189 lb)    Body Mass Index Oxygen Saturation Smoking Status             29.59 kg/m2 97% Never Smoker          Basic Information     Date Of Birth Sex Race Ethnicity Preferred Language    1970 Female White Non- English      Your appointments     2017  1:20 PM   Established Patient with JUDD Barnett   [facility] (--)    [address] [address] [id number] [phone]           You will be receiving a confirmation call a few days before your appointment from our automated call confirmation system.              Problem List              ICD-10-CM Priority Class Noted - Resolved    Anxiety F41.9   Unknown - Present    Ocular migraine G43.109   2012 - Present    Hyperlipidemia E78.5   2012 - Present    Menopause Z78.0   3/19/2014 - Present    GERD (gastroesophageal reflux disease) " K21.9   3/19/2014 - Present    Aspirin-sensitive asthma with nasal polyps J45.909, J33.9, Z88.6   8/18/2016 - Present    Chronic sinusitis J32.9   11/11/2016 - Present    Apnea R06.81   11/11/2016 - Present    CARI (obstructive sleep apnea) G47.33   12/8/2016 - Present    Central sleep apnea G47.31   12/8/2016 - Present      Health Maintenance        Date Due Completion Dates    IMM PNEUMOCOCCAL 19-64 (ADULT) MEDIUM RISK SERIES (1 of 1 - PPSV23) 1/26/1988 ---    MAMMOGRAM 8/8/2015 8/8/2014, 8/23/2013, 8/17/2012, 8/16/2011    PAP SMEAR 7/31/2017 7/31/2014, 4/29/2011, 3/23/2010, 8/19/2009    IMM DTaP/Tdap/Td Vaccine (2 - Td) 4/13/2026 4/13/2016            Current Immunizations     Influenza TIV (IM) 11/12/2013    Influenza Vaccine Pediatric 11/18/2010 12:42 PM, 9/30/2009, 10/24/2008    Influenza Vaccine Quad Inj (Pf) 11/11/2016 11:36 AM    Tdap Vaccine 4/13/2016 12:01 PM    Tetanus Vaccine 2/18/1997      Below and/or attached are the medications your provider expects you to take. Review all of your home medications and newly ordered medications with your provider and/or pharmacist. Follow medication instructions as directed by your provider and/or pharmacist. Please keep your medication list with you and share with your provider. Update the information when medications are discontinued, doses are changed, or new medications (including over-the-counter products) are added; and carry medication information at all times in the event of emergency situations     Allergies:  ASA - Anaphylaxis     NSAIDS - Anaphylaxis     SULFA DRUGS - Anaphylaxis     CONTRAST MEDIA WITH IODINE - Itching               Medications  Valid as of: June 19, 2017 -  2:49 PM    Generic Name Brand Name Tablet Size Instructions for use    Atorvastatin Calcium (Tab) LIPITOR 20 MG Take 1 Tab by mouth every day.        EPINEPHrine HCl (Anaphylaxis) (Device) EPIPEN 0.3 MG/0.3ML (1:1000) 0.3 mL by Intramuscular route Once PRN for 1 dose.        Loratadine    Take  by mouth every day.        Montelukast Sodium (Tab) SINGULAIR 10 MG Take 1 Tab by mouth every day.        Omeprazole (CAPSULE DELAYED RELEASE) PRILOSEC 20 MG Take 1 Cap by mouth every day.        TiZANidine HCl (Tab) ZANAFLEX 4 MG Take 1 Tab by mouth at bedtime as needed (joint pain / muscle tightness).        .                 Medicines prescribed today were sent to:     [id number] - [place], [place] - [address] [facility]    [address] [facility] [address]    Phone: [phone] Fax: [phone]    Open 24 Hours?: No      Medication refill instructions:       If your prescription bottle indicates you have medication refills left, it is not necessary to call your provider’s office. Please contact your pharmacy and they will refill your medication.    If your prescription bottle indicates you do not have any refills left, you may request refills at any time through one of the following ways: The online [employer] system (except Urgent Care), by calling your provider’s office, or by asking your pharmacy to contact your provider’s office with a refill request. Medication refills are processed only during regular business hours and may not be available until the next business day. Your provider may request additional information or to have a follow-up visit with you prior to refilling your medication.   *Please Note: Medication refills are assigned a new Rx number when refilled electronically. Your pharmacy may indicate that no refills were authorized even though a new prescription for the same medication is available at the pharmacy. Please request the medicine by name with the pharmacy before contacting your provider for a refill.        Your To Do List     Future Labs/Procedures Complete By Expires    CBC WITH DIFFERENTIAL  As directed 6/20/2018    COMP METABOLIC PANEL  As directed 6/20/2018    HEMOGLOBIN A1C  As directed 6/20/2018    HEP C VIRUS ANTIBODY  As directed 6/19/2018    HIV ANTIBODIES  As directed  6/19/2018    LIPID PROFILE  As directed 6/20/2018    MA-SCREEN MAMMO W/CAD-BILAT  As directed 7/21/2018    T.PALLIDUM AB EIA  As directed 6/19/2018      Referral     A referral request has been sent to our patient care coordination department. Please allow 3-5 business days for us to process this request and contact you either by phone or mail. If you do not hear from us by the 5th business day, please call us at [phone].           [email or web address] Access Code: Activation code not generated  Current [email or web address] Status: Active

## 2017-06-24 ENCOUNTER — APPOINTMENT (OUTPATIENT)
Dept: RADIOLOGY | Facility: MEDICAL CENTER | Age: 47
End: 2017-06-24
Attending: NURSE PRACTITIONER
Payer: COMMERCIAL

## 2017-06-26 ENCOUNTER — HOSPITAL ENCOUNTER (OUTPATIENT)
Dept: RADIOLOGY | Facility: MEDICAL CENTER | Age: 47
End: 2017-06-26
Attending: NURSE PRACTITIONER
Payer: COMMERCIAL

## 2017-06-26 DIAGNOSIS — Z12.31 VISIT FOR SCREENING MAMMOGRAM: ICD-10-CM

## 2017-06-26 DIAGNOSIS — Z12.31 ENCOUNTER FOR SCREENING MAMMOGRAM FOR BREAST CANCER: ICD-10-CM

## 2017-06-26 PROCEDURE — 77063 BREAST TOMOSYNTHESIS BI: CPT

## 2017-06-27 ENCOUNTER — HOSPITAL ENCOUNTER (OUTPATIENT)
Dept: LAB | Facility: MEDICAL CENTER | Age: 47
End: 2017-06-27
Attending: NURSE PRACTITIONER
Payer: COMMERCIAL

## 2017-06-27 DIAGNOSIS — Z00.00 PREVENTATIVE HEALTH CARE: ICD-10-CM

## 2017-06-27 LAB
ALBUMIN SERPL BCP-MCNC: 4.5 G/DL (ref 3.2–4.9)
ALBUMIN/GLOB SERPL: 1.5 G/DL
ALP SERPL-CCNC: 109 U/L (ref 30–99)
ALT SERPL-CCNC: 58 U/L (ref 2–50)
ANION GAP SERPL CALC-SCNC: 9 MMOL/L (ref 0–11.9)
AST SERPL-CCNC: 39 U/L (ref 12–45)
BASOPHILS # BLD AUTO: 1 % (ref 0–1.8)
BASOPHILS # BLD: 0.09 K/UL (ref 0–0.12)
BILIRUB SERPL-MCNC: 0.8 MG/DL (ref 0.1–1.5)
BUN SERPL-MCNC: 11 MG/DL (ref 8–22)
CALCIUM SERPL-MCNC: 9.7 MG/DL (ref 8.5–10.5)
CHLORIDE SERPL-SCNC: 107 MMOL/L (ref 96–112)
CHOLEST SERPL-MCNC: 220 MG/DL (ref 100–199)
CO2 SERPL-SCNC: 27 MMOL/L (ref 20–33)
CREAT SERPL-MCNC: 0.8 MG/DL (ref 0.5–1.4)
EOSINOPHIL # BLD AUTO: 0.45 K/UL (ref 0–0.51)
EOSINOPHIL NFR BLD: 5.2 % (ref 0–6.9)
ERYTHROCYTE [DISTWIDTH] IN BLOOD BY AUTOMATED COUNT: 45.5 FL (ref 35.9–50)
EST. AVERAGE GLUCOSE BLD GHB EST-MCNC: 111 MG/DL
GFR SERPL CREATININE-BSD FRML MDRD: >60 ML/MIN/1.73 M 2
GLOBULIN SER CALC-MCNC: 3.1 G/DL (ref 1.9–3.5)
GLUCOSE SERPL-MCNC: 107 MG/DL (ref 65–99)
HBA1C MFR BLD: 5.5 % (ref 0–5.6)
HCT VFR BLD AUTO: 45.9 % (ref 37–47)
HCV AB SER QL: NEGATIVE
HDLC SERPL-MCNC: 75 MG/DL
HGB BLD-MCNC: 15.2 G/DL (ref 12–16)
HIV 1+2 AB+HIV1 P24 AG SERPL QL IA: NON REACTIVE
IMM GRANULOCYTES # BLD AUTO: 0.03 K/UL (ref 0–0.11)
IMM GRANULOCYTES NFR BLD AUTO: 0.3 % (ref 0–0.9)
LDLC SERPL CALC-MCNC: 125 MG/DL
LYMPHOCYTES # BLD AUTO: 2.71 K/UL (ref 1–4.8)
LYMPHOCYTES NFR BLD: 31.4 % (ref 22–41)
MCH RBC QN AUTO: 32.3 PG (ref 27–33)
MCHC RBC AUTO-ENTMCNC: 33.1 G/DL (ref 33.6–35)
MCV RBC AUTO: 97.5 FL (ref 81.4–97.8)
MONOCYTES # BLD AUTO: 0.81 K/UL (ref 0–0.85)
MONOCYTES NFR BLD AUTO: 9.4 % (ref 0–13.4)
NEUTROPHILS # BLD AUTO: 4.53 K/UL (ref 2–7.15)
NEUTROPHILS NFR BLD: 52.7 % (ref 44–72)
NRBC # BLD AUTO: 0 K/UL
NRBC BLD AUTO-RTO: 0 /100 WBC
PLATELET # BLD AUTO: 291 K/UL (ref 164–446)
PMV BLD AUTO: 10.4 FL (ref 9–12.9)
POTASSIUM SERPL-SCNC: 4.1 MMOL/L (ref 3.6–5.5)
PROT SERPL-MCNC: 7.6 G/DL (ref 6–8.2)
RBC # BLD AUTO: 4.71 M/UL (ref 4.2–5.4)
SODIUM SERPL-SCNC: 143 MMOL/L (ref 135–145)
TREPONEMA PALLIDUM IGG+IGM AB [PRESENCE] IN SERUM OR PLASMA BY IMMUNOASSAY: NON REACTIVE
TRIGL SERPL-MCNC: 100 MG/DL (ref 0–149)
WBC # BLD AUTO: 8.6 K/UL (ref 4.8–10.8)

## 2017-06-27 PROCEDURE — 86780 TREPONEMA PALLIDUM: CPT

## 2017-06-27 PROCEDURE — 80061 LIPID PANEL: CPT

## 2017-06-27 PROCEDURE — 86803 HEPATITIS C AB TEST: CPT

## 2017-06-27 PROCEDURE — 36415 COLL VENOUS BLD VENIPUNCTURE: CPT

## 2017-06-27 PROCEDURE — 80053 COMPREHEN METABOLIC PANEL: CPT

## 2017-06-27 PROCEDURE — 87389 HIV-1 AG W/HIV-1&-2 AB AG IA: CPT

## 2017-06-27 PROCEDURE — 83036 HEMOGLOBIN GLYCOSYLATED A1C: CPT

## 2017-06-27 PROCEDURE — 85025 COMPLETE CBC W/AUTO DIFF WBC: CPT

## 2017-07-06 ENCOUNTER — OFFICE VISIT (OUTPATIENT)
Dept: MEDICAL GROUP | Facility: LAB | Age: 47
End: 2017-07-06
Payer: COMMERCIAL

## 2017-07-06 ENCOUNTER — SLEEP CENTER VISIT (OUTPATIENT)
Dept: SLEEP MEDICINE | Facility: MEDICAL CENTER | Age: 47
End: 2017-07-06
Payer: COMMERCIAL

## 2017-07-06 ENCOUNTER — HOSPITAL ENCOUNTER (OUTPATIENT)
Facility: MEDICAL CENTER | Age: 47
End: 2017-07-06
Attending: NURSE PRACTITIONER
Payer: COMMERCIAL

## 2017-07-06 VITALS
HEART RATE: 56 BPM | TEMPERATURE: 98.7 F | BODY MASS INDEX: 29.82 KG/M2 | HEIGHT: 67 IN | RESPIRATION RATE: 12 BRPM | OXYGEN SATURATION: 97 % | SYSTOLIC BLOOD PRESSURE: 128 MMHG | WEIGHT: 190 LBS | DIASTOLIC BLOOD PRESSURE: 88 MMHG

## 2017-07-06 VITALS
HEIGHT: 67 IN | WEIGHT: 188 LBS | HEART RATE: 60 BPM | SYSTOLIC BLOOD PRESSURE: 118 MMHG | TEMPERATURE: 98.1 F | DIASTOLIC BLOOD PRESSURE: 68 MMHG | RESPIRATION RATE: 16 BRPM | BODY MASS INDEX: 29.51 KG/M2 | OXYGEN SATURATION: 96 %

## 2017-07-06 DIAGNOSIS — G47.31 CENTRAL SLEEP APNEA: ICD-10-CM

## 2017-07-06 DIAGNOSIS — R79.89 ELEVATED LIVER FUNCTION TESTS: ICD-10-CM

## 2017-07-06 DIAGNOSIS — Z01.419 ENCOUNTER FOR GYNECOLOGICAL EXAMINATION: ICD-10-CM

## 2017-07-06 DIAGNOSIS — Z12.4 SCREENING FOR MALIGNANT NEOPLASM OF CERVIX: ICD-10-CM

## 2017-07-06 DIAGNOSIS — R82.90 FOUL SMELLING URINE: ICD-10-CM

## 2017-07-06 DIAGNOSIS — G47.33 OSA (OBSTRUCTIVE SLEEP APNEA): ICD-10-CM

## 2017-07-06 LAB
APPEARANCE UR: NORMAL
BILIRUB UR STRIP-MCNC: NORMAL MG/DL
COLOR UR AUTO: YELLOW
GLUCOSE UR STRIP.AUTO-MCNC: NORMAL MG/DL
KETONES UR STRIP.AUTO-MCNC: NORMAL MG/DL
LEUKOCYTE ESTERASE UR QL STRIP.AUTO: NORMAL
NITRITE UR QL STRIP.AUTO: NORMAL
PH UR STRIP.AUTO: 6.5 [PH] (ref 5–8)
PROT UR QL STRIP: NORMAL MG/DL
RBC UR QL AUTO: NORMAL
SP GR UR STRIP.AUTO: 1.02
UROBILINOGEN UR STRIP-MCNC: NORMAL MG/DL

## 2017-07-06 PROCEDURE — 81002 URINALYSIS NONAUTO W/O SCOPE: CPT | Performed by: NURSE PRACTITIONER

## 2017-07-06 PROCEDURE — 99213 OFFICE O/P EST LOW 20 MIN: CPT | Performed by: NURSE PRACTITIONER

## 2017-07-06 PROCEDURE — 88175 CYTOPATH C/V AUTO FLUID REDO: CPT

## 2017-07-06 PROCEDURE — 87491 CHLMYD TRACH DNA AMP PROBE: CPT

## 2017-07-06 PROCEDURE — 87591 N.GONORRHOEAE DNA AMP PROB: CPT

## 2017-07-06 PROCEDURE — 99396 PREV VISIT EST AGE 40-64: CPT | Performed by: NURSE PRACTITIONER

## 2017-07-06 NOTE — PROGRESS NOTES
Chief Complaint   Patient presents with   • Gynecologic Exam     annual pap    • Finger Injury     pt jammed her left thumb playing softball, painful & swollen, onset 7 days        HPI:  Krissy is a 47 y.o.  female who presents for annual exam. Generally the patient is feeling good. Occasional foul smell to urine, no dysuria - wants urine checked to make sure.  Dating again - denies any abnormal vaginal discharge, pelvic pain or vaginal rashes.      Regarding her health maintenance:   Last pap: 2014  Abnormal Pap hx: none  Periods: no menses since age 39 - menses never returned after tubal ligation.  No HRT.  No hot flashes / night flashes. No vaginal dryness.    Contraception: menopause   Last Mammo: mammogram UTD  Last colonoscopy:not applicable   Bone density test:N\A   Last Lab: UTD - continues   Last Td:current   Influenza vaccination:current   Pneumococcal vaccination:not applicable   Zostavax:not applicable   Hx STD''s: History of chlamydia in high school, no reoccurrence of any STD since.  Regular exercise: yes  cpap every evening - lost 10 lbs.  Feeling well.        meds:   Current Outpatient Prescriptions   Medication Sig Dispense Refill   • tizanidine (ZANAFLEX) 4 MG Tab Take 1 Tab by mouth at bedtime as needed (joint pain / muscle tightness). 30 Tab 1   • omeprazole (PRILOSEC) 20 MG delayed-release capsule Take 1 Cap by mouth every day. 30 Cap 5   • atorvastatin (LIPITOR) 20 MG Tab Take 1 Tab by mouth every day. 30 Tab 2   • montelukast (SINGULAIR) 10 MG Tab Take 1 Tab by mouth every day. 30 Tab 11   • Loratadine (KS ALLERCLEAR PO) Take  by mouth every day.     • epinephrine (EPIPEN) 0.3 MG/0.3ML (1:1000) injection 0.3 mL by Intramuscular route Once PRN for 1 dose. 1 Each 3     No current facility-administered medications for this visit.       Allergies: No Known Allergies    family:   Family History   Problem Relation Age of Onset   • Cancer Father      prostate   • Hypertension Father    • Arthritis  "Father    • Prostate cancer Father    • Cancer Sister      melanoma   • Cancer Paternal Aunt      breast   • Diabetes     • Sleep Apnea Neg Hx        social hx:   Social History     Social History   • Marital Status:      Spouse Name: N/A   • Number of Children: N/A   • Years of Education: N/A     Occupational History   • Not on file.     Social History Main Topics   • Smoking status: Never Smoker    • Smokeless tobacco: Never Used   • Alcohol Use: 4.2 oz/week     7 Glasses of wine per week      Comment: once a day   • Drug Use: No   • Sexual Activity: Not on file      Comment: , 2 children, teacher     Other Topics Concern   • Not on file     Social History Narrative       ROS:  No fever, chills, sweats.   No polydipsia, polyuria, temperature intolerance, significant weight changes   No visual changes, blurred vision.  No chest pain, palpitations, peripheral swelling   No chronic cough, shortness of breath, dyspnea with exertion.   No dysphagia, odynophagia, black or bloody stools.   No abdominal pain, nausea, persistent diarrhea, constipation   No dysuria, hematuria, incontinence. Denies nocturia  No rash, pruritis, pigment changes.   No focal weakness, syncope, headache, confusion, persistent numbness.   All other systems are reviewed and negative.    PHYSICAL EXAMINATION:  Blood pressure 128/88, pulse 56, temperature 37.1 °C (98.7 °F), resp. rate 12, height 1.702 m (5' 7.01\"), weight 86.183 kg (190 lb), SpO2 97 %.  General appearance:healthy, well developed, well nourished  Psych: alert, no distress, cooperative  Eyes: EOM's normal, pupils equal, round, reactive to light  ENT: Ears: external ears normal to inspection and palpation, TM's clear, Nose/Sinuses: nose shows no deformity, asymmetry, or inflammation  Neck: no asymmetry, masses, or scars, no adenopathy, thyroid normal to palpation  Lungs:chest symmetric with normal A/P diameter, no chest deformities noted, normal respiratory rate and " rhythm  Cardiovascular:regular rate and rhythm, S1 normal  Breasts: normal in size and symmetry, skin normal, physiologic fibronodularity  Abdomen: umbilicus normal, no masses palpable, no organomegaly  Musculoskeletal:ROM of all joints is normal, no evidence of joint instability  Lymphatic: None significantly enlarged  Skin: no rash, no edema  Neuro: mental status intact, cranial nerves 2-12 intact  Pelvic: external genitalia normal, cervix normal in appearance, bimanual exam reveals normal uterus, adnexa without masses or tenderness, vaginal mucosa normal      ASSESSMENT/PLAN:  1.annual physical exam: HCM:  Pap and breast exams done.  BSE technique reviewed and patient encouraged to perform self-exam monthly.   Encourage monthly self breast exam  Encourage daily exercise for at least 30 minutes  Recommend 1500 mg Calcium with 600 units vit d daily.    Follow up one year for annual PAP. Gonorrhea and chlamydia included on today's Pap smear as she has been sexually active without a condom in the last 3 months. She completed blood work for HIV, syphilis and hepatitis, all of which were negative.  Blood work reviewed, her liver is irritated. She is drinking 3 whiskeys at night, encouraged her to decrease this to one and recheck her liver in 3 months. Her cholesterol is also dramatically improved and she would like to decrease her atorvastatin to a half a tablet, rechecking her cholesterol in 3 months as she has made a lot of positive lifestyle changes.  Urinalysis is negative today. Encouraged her to increase her water intake.

## 2017-07-06 NOTE — MR AVS SNAPSHOT
"Fartun Mittal   2017 4:20 PM   Sleep Center Visit   MRN: [id number]    Department:  [facility]   Dept Phone:  [phone]    Description:  Female : 1970   Provider:  KIMBERLY Jones           Allergies as of 2017     Allergen Noted Reactions    Asa [Aspirin] 2009   Anaphylaxis    Nsaids 2010   Anaphylaxis    Sulfa Drugs 2010   Anaphylaxis    Contrast Media With Iodine [Iodine] 2014   Itching      You were diagnosed with     CARI (obstructive sleep apnea)   [540250]       Central sleep apnea   [950979]         Vital Signs     Blood Pressure Pulse Temperature Respirations Height Weight    118/68 mmHg 60 36.7 °C (98.1 °F) 16 1.702 m (5' 7.01\") 85.276 kg (188 lb)    Body Mass Index Oxygen Saturation Smoking Status             29.44 kg/m2 96% Never Smoker          Basic Information     Date Of Birth Sex Race Ethnicity Preferred Language    1970 Female White Non- English      Problem List              ICD-10-CM Priority Class Noted - Resolved    Anxiety F41.9   Unknown - Present    Ocular migraine G43.109   2012 - Present    Hyperlipidemia E78.5   2012 - Present    Menopause Z78.0   3/19/2014 - Present    GERD (gastroesophageal reflux disease) K21.9   3/19/2014 - Present    Aspirin-sensitive asthma with nasal polyps J45.909, J33.9, Z88.6   2016 - Present    Chronic sinusitis J32.9   2016 - Present    Apnea R06.81   2016 - Present    CARI (obstructive sleep apnea) G47.33   2016 - Present    Central sleep apnea G47.31   2016 - Present      Health Maintenance        Date Due Completion Dates    IMM PNEUMOCOCCAL 19-64 (ADULT) MEDIUM RISK SERIES (1 of 1 - PPSV23) 1988 ---    PAP SMEAR 2017, 2011, 3/23/2010, 2009    IMM INFLUENZA (1) 2016, 2013, 2010, 2009, 10/24/2008    MAMMOGRAM 2018, 2014, 2013, 2012, 2011  "    IMM DTaP/Tdap/Td Vaccine (2 - Td) 4/13/2026 4/13/2016            Current Immunizations     Influenza TIV (IM) 11/12/2013    Influenza Vaccine Pediatric 11/18/2010 12:42 PM, 9/30/2009, 10/24/2008    Influenza Vaccine Quad Inj (Pf) 11/11/2016 11:36 AM    Tdap Vaccine 4/13/2016 12:01 PM    Tetanus Vaccine 2/18/1997      Below and/or attached are the medications your provider expects you to take. Review all of your home medications and newly ordered medications with your provider and/or pharmacist. Follow medication instructions as directed by your provider and/or pharmacist. Please keep your medication list with you and share with your provider. Update the information when medications are discontinued, doses are changed, or new medications (including over-the-counter products) are added; and carry medication information at all times in the event of emergency situations     Allergies:  ASA - Anaphylaxis     NSAIDS - Anaphylaxis     SULFA DRUGS - Anaphylaxis     CONTRAST MEDIA WITH IODINE - Itching               Medications  Valid as of: July 06, 2017 -  4:46 PM    Generic Name Brand Name Tablet Size Instructions for use    Atorvastatin Calcium (Tab) LIPITOR 20 MG Take 1 Tab by mouth every day.        EPINEPHrine HCl (Anaphylaxis) (Device) EPIPEN 0.3 MG/0.3ML (1:1000) 0.3 mL by Intramuscular route Once PRN for 1 dose.        Loratadine   Take  by mouth every day.        Montelukast Sodium (Tab) SINGULAIR 10 MG Take 1 Tab by mouth every day.        Omeprazole (CAPSULE DELAYED RELEASE) PRILOSEC 20 MG Take 1 Cap by mouth every day.        TiZANidine HCl (Tab) ZANAFLEX 4 MG Take 1 Tab by mouth at bedtime as needed (joint pain / muscle tightness).        .                 Medicines prescribed today were sent to:     [id number] - [place] - [address] [facility]    [address] [facility] [place] [address]    Phone: [phone] Fax: [phone]    Open 24 Hours?: No      Medication refill instructions:       If your prescription  bottle indicates you have medication refills left, it is not necessary to call your provider’s office. Please contact your pharmacy and they will refill your medication.    If your prescription bottle indicates you do not have any refills left, you may request refills at any time through one of the following ways: The online [employer] system (except Urgent Care), by calling your provider’s office, or by asking your pharmacy to contact your provider’s office with a refill request. Medication refills are processed only during regular business hours and may not be available until the next business day. Your provider may request additional information or to have a follow-up visit with you prior to refilling your medication.   *Please Note: Medication refills are assigned a new Rx number when refilled electronically. Your pharmacy may indicate that no refills were authorized even though a new prescription for the same medication is available at the pharmacy. Please request the medicine by name with the pharmacy before contacting your provider for a refill.        Instructions    1) Continue ASV at 7/5/42tcT15   2) Clean mask and supplies weekly and change them as insurance allows  3) Vaccines: Up to date with flu  4) Return in about 6 months (around 1/6/2018) for Compliance, review of symptoms, if not sooner, follow up with KIM De Anda.              [employer] Access Code: Activation code not generated  Current [employer] Status: Active

## 2017-07-06 NOTE — MR AVS SNAPSHOT
"Fartun Mittal   2017 2:20 PM   Office Visit   MRN: [id number]    Department:  [facility]   Dept Phone:  [phone]    Description:  Female : 1970   Provider:  JUDD Barnett           Reason for Visit     Gynecologic Exam annual pap     Finger Injury pt jammed her left thumb playing softball, painful & swollen, onset 7 days       Allergies as of 2017     Allergen Noted Reactions    Asa [Aspirin] 2009   Anaphylaxis    Nsaids 2010   Anaphylaxis    Sulfa Drugs 2010   Anaphylaxis    Contrast Media With Iodine [Iodine] 2014   Itching      You were diagnosed with     Encounter for gynecological examination   [7860399]       Screening for malignant neoplasm of cervix   [761237]       Elevated liver function tests   [816508]       Foul smelling urine   [042897]         Vital Signs     Blood Pressure Pulse Temperature Respirations Height Weight    128/88 mmHg 56 37.1 °C (98.7 °F) 12 1.702 m (5' 7.01\") 86.183 kg (190 lb)    Body Mass Index Oxygen Saturation Smoking Status             29.75 kg/m2 97% Never Smoker          Basic Information     Date Of Birth Sex Race Ethnicity Preferred Language    1970 Female White Non- English      Your appointments     2017  4:20 PM   Follow UP with KIMBERLY Jones   [facility] (--)    [address] [place] [id number] [phone]              Problem List              ICD-10-CM Priority Class Noted - Resolved    Anxiety F41.9   Unknown - Present    Ocular migraine G43.109   2012 - Present    Hyperlipidemia E78.5   2012 - Present    Menopause Z78.0   3/19/2014 - Present    GERD (gastroesophageal reflux disease) K21.9   3/19/2014 - Present    Aspirin-sensitive asthma with nasal polyps J45.909, J33.9, Z88.6   2016 - Present    Chronic sinusitis J32.9   2016 - Present    Apnea R06.81   2016 - Present    CARI " (obstructive sleep apnea) G47.33   12/8/2016 - Present    Central sleep apnea G47.31   12/8/2016 - Present      Health Maintenance        Date Due Completion Dates    IMM PNEUMOCOCCAL 19-64 (ADULT) MEDIUM RISK SERIES (1 of 1 - PPSV23) 1/26/1988 ---    PAP SMEAR 7/31/2017 7/31/2014, 4/29/2011, 3/23/2010, 8/19/2009    IMM INFLUENZA (1) 9/1/2017 11/11/2016, 11/12/2013, 11/18/2010, 9/30/2009, 10/24/2008    MAMMOGRAM 6/26/2018 6/26/2017, 8/8/2014, 8/23/2013, 8/17/2012, 8/16/2011    IMM DTaP/Tdap/Td Vaccine (2 - Td) 4/13/2026 4/13/2016            Current Immunizations     Influenza TIV (IM) 11/12/2013    Influenza Vaccine Pediatric 11/18/2010 12:42 PM, 9/30/2009, 10/24/2008    Influenza Vaccine Quad Inj (Pf) 11/11/2016 11:36 AM    Tdap Vaccine 4/13/2016 12:01 PM    Tetanus Vaccine 2/18/1997      Below and/or attached are the medications your provider expects you to take. Review all of your home medications and newly ordered medications with your provider and/or pharmacist. Follow medication instructions as directed by your provider and/or pharmacist. Please keep your medication list with you and share with your provider. Update the information when medications are discontinued, doses are changed, or new medications (including over-the-counter products) are added; and carry medication information at all times in the event of emergency situations     Allergies:  ASA - Anaphylaxis     NSAIDS - Anaphylaxis     SULFA DRUGS - Anaphylaxis     CONTRAST MEDIA WITH IODINE - Itching               Medications  Valid as of: July 06, 2017 -  3:42 PM    Generic Name Brand Name Tablet Size Instructions for use    Atorvastatin Calcium (Tab) LIPITOR 20 MG Take 1 Tab by mouth every day.        EPINEPHrine HCl (Anaphylaxis) (Device) EPIPEN 0.3 MG/0.3ML (1:1000) 0.3 mL by Intramuscular route Once PRN for 1 dose.        Loratadine   Take  by mouth every day.        Montelukast Sodium (Tab) SINGULAIR 10 MG Take 1 Tab by mouth every day.         Omeprazole (CAPSULE DELAYED RELEASE) PRILOSEC 20 MG Take 1 Cap by mouth every day.        TiZANidine HCl (Tab) ZANAFLEX 4 MG Take 1 Tab by mouth at bedtime as needed (joint pain / muscle tightness).        .                 Medicines prescribed today were sent to:     [facility] [place], [place] - [address] [facility]    [address] [facility] [place] [address]    Phone: [phone] Fax: [phone]    Open 24 Hours?: No      Medication refill instructions:       If your prescription bottle indicates you have medication refills left, it is not necessary to call your provider’s office. Please contact your pharmacy and they will refill your medication.    If your prescription bottle indicates you do not have any refills left, you may request refills at any time through one of the following ways: The online [employer] system (except Urgent Care), by calling your provider’s office, or by asking your pharmacy to contact your provider’s office with a refill request. Medication refills are processed only during regular business hours and may not be available until the next business day. Your provider may request additional information or to have a follow-up visit with you prior to refilling your medication.   *Please Note: Medication refills are assigned a new Rx number when refilled electronically. Your pharmacy may indicate that no refills were authorized even though a new prescription for the same medication is available at the pharmacy. Please request the medicine by name with the pharmacy before contacting your provider for a refill.        Your To Do List     Future Labs/Procedures Complete By Expires    HEPATIC FUNCTION PANEL  As directed 7/6/2018    LIPID PROFILE  As directed 7/7/2018    THINPREP RFLX HPV ASCUS W/CTNG  As directed 7/7/2018         [employer] Access Code: Activation code not generated  Current [employer] Status: Active

## 2017-07-06 NOTE — PATIENT INSTRUCTIONS
1) Continue ASV at 7/5/42nyT14   2) Clean mask and supplies weekly and change them as insurance allows  3) Vaccines: Up to date with flu  4) Return in about 6 months (around 1/6/2018) for Compliance, review of symptoms, if not sooner, follow up with KIM De Anda.

## 2017-07-06 NOTE — PROGRESS NOTES
CC:  Here for f/u sleep issues as listed below    HPI:   Fartun presents today for follow up obstructive sleep apnea, central sleep apnea.  PSG from 12/2016 indicated an AHI of 120.6 and low oxygen of 78%. A titration sleep study and was completed 1-2017 due to central apneas. She had successful study on ASV machine EPAP pressure of 10 with reduced AHI, full correction of low oxygen, and REM rebound.   Currently she is being treated with ASV @ EPAP of 8 with PS cmH20. Compliance download from the dates 6/7/2017 - 7/6/2017 indicates she is wearing the device 93% for an avg of 8 hours and 6 minutes per night with a reduced AHI of 1.9.     She does tolerate pressure and mask well.  She loves her machine! She wakes up refreshed and denies morning H/A. Gas has resolved. she sleeps better overall. She is amazed how much better she feels. Her BP has decreased. Her total cholesterol dropped by 30 points. she will continue to clean supplies weekly and change them as insurance allows. 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             Patient Active Problem List    Diagnosis Date Noted   • CARI (obstructive sleep apnea) 12/08/2016   • Central sleep apnea 12/08/2016   • Chronic sinusitis 11/11/2016   • Apnea 11/11/2016   • Aspirin-sensitive asthma with nasal polyps 08/18/2016   • Menopause 03/19/2014   • GERD (gastroesophageal reflux disease) 03/19/2014   • Ocular migraine 01/18/2012   • Hyperlipidemia 01/18/2012   • Anxiety        Past Medical History   Diagnosis Date   • Allergy    • Anxiety    • Tubal ligation    • Heart burn    • Snoring      sleep apnea questionairre completed   • Psychiatric problem      depression   • Allergic rhinitis    • Sleep apnea    • Chickenpox    • Influenza        Past Surgical History   Procedure Laterality Date   • Tonsillectomy  1981   • Appendectomy  3/2003   • Tubal coagulation laparoscopic bilateral  2007   • Abdominoplasty  2009   • Foot orif   1988     left   • Bunionectomy  5/18/2011     Performed by MOLLY GARCIA at SURGERY [facility]   • Cheilectomy  5/18/2011     Performed by MOLLY GARCIA at [facility]ERY [facility]   • Bone spur excision  5/18/2011     Performed by MOLLY GARCIA at SUR[facility]ERY [facility]   • Eswt  5/18/2011     Performed by MOLLY GARCIA at SUR[facility]ERY [facility]       Family History   Problem Relation Age of Onset   • Cancer Father      prostate   • Hypertension Father    • Arthritis Father    • Prostate cancer Father    • Cancer Sister      melanoma   • Cancer Paternal Aunt      breast   • Diabetes     • Sleep Apnea Neg Hx        Social History     Social History   • Marital Status:      Spouse Name: N/A   • Number of Children: N/A   • Years of Education: N/A     Occupational History   • Not on file.     Social History Main Topics   • Smoking status: Never Smoker    • Smokeless tobacco: Never Used   • Alcohol Use: 4.2 oz/week     7 Glasses of wine per week      Comment: once a day   • Drug Use: No   • Sexual Activity: Not on file      Comment: , 2 children, teacher     Other Topics Concern   • Not on file     Social History Narrative       Current Outpatient Prescriptions   Medication Sig Dispense Refill   • tizanidine (ZANAFLEX) 4 MG Tab Take 1 Tab by mouth at bedtime as needed (joint pain / muscle tightness). 30 Tab 1   • omeprazole (PRILOSEC) 20 MG delayed-release capsule Take 1 Cap by mouth every day. 30 Cap 5   • atorvastatin (LIPITOR) 20 MG Tab Take 1 Tab by mouth every day. 30 Tab 2   • montelukast (SINGULAIR) 10 MG Tab Take 1 Tab by mouth every day. 30 Tab 11   • Loratadine (KS ALLERCLEAR PO) Take  by mouth every day.     • epinephrine (EPIPEN) 0.3 MG/0.3ML (1:1000) injection 0.3 mL by Intramuscular route Once PRN for 1 dose. 1 Each 3     No current facility-administered medications for this visit.          Allergies: Asa; Nsaids; Sulfa drugs; and Contrast media with  "iodine      ROS   Gen: Denies fever, chills, unintentional weight loss, fatigue  Resp:Denies Dyspnea  CV: Denies chest pain, chest tightness  Sleep:Denies morning headache, insomnia, daytime somnolence, snoring, gasping for air, apnea  Neuro: Denies frequent headaches, weakness, dizziness  See HPI.  All other systems reviewed and negative        Vital signs for this encounter:  Filed Vitals:    07/06/17 1609   Height: 1.702 m (5' 7.01\")   Weight: 85.276 kg (188 lb)   Weight % change since last entry.: 0 %   BP: 118/68   Pulse: 60   BMI (Calculated): 29.44   Resp: 16   Temp: 36.7 °C (98.1 °F)   O2 sat % room air: 96 %                   Physical Exam:   Gen:         Alert and oriented, No apparent distress.   Neck:        No Lymphadenopathy.  Lungs:     Clear to auscultation bilaterally.    CV:          Regular rate and rhythm. No murmurs, rubs or gallops.   Abd:         Soft non tender, non distended.            Ext:          No clubbing, cyanosis, edema.    Assessment   1. CARI (obstructive sleep apnea)     2. Central sleep apnea         PLAN:   Patient Instructions   1) Continue ASV at 7/5/57vtE10   2) Clean mask and supplies weekly and change them as insurance allows  3) Vaccines: Up to date with flu  4) Return in about 6 months (around 1/6/2018) for Compliance, review of symptoms, if not sooner, follow up with KIM De Anda.            "

## 2017-07-07 LAB
C TRACH DNA GENITAL QL NAA+PROBE: NEGATIVE
CYTOLOGY REG CYTOL: NORMAL
N GONORRHOEA DNA GENITAL QL NAA+PROBE: NEGATIVE
SPECIMEN SOURCE: NORMAL

## 2017-10-09 DIAGNOSIS — M25.50 MULTIPLE JOINT PAIN: ICD-10-CM

## 2017-10-09 RX ORDER — TIZANIDINE 4 MG/1
4 TABLET ORAL NIGHTLY PRN
Qty: 30 TAB | Refills: 1 | Status: SHIPPED | OUTPATIENT
Start: 2017-10-09 | End: 2018-04-02 | Stop reason: SDUPTHER

## 2017-10-09 NOTE — TELEPHONE ENCOUNTER
Was the patient seen in the last year in this department? Yes  07/16/17    Does patient have an active prescription for medications requested? No     Received Request Via: Pharmacy

## 2017-11-21 ENCOUNTER — NON-PROVIDER VISIT (OUTPATIENT)
Dept: MEDICAL GROUP | Facility: LAB | Age: 47
End: 2017-11-21
Payer: COMMERCIAL

## 2017-11-21 DIAGNOSIS — Z23 NEED FOR INFLUENZA VACCINATION: ICD-10-CM

## 2017-11-21 PROCEDURE — 90686 IIV4 VACC NO PRSV 0.5 ML IM: CPT | Performed by: FAMILY MEDICINE

## 2017-11-21 PROCEDURE — 90471 IMMUNIZATION ADMIN: CPT | Performed by: FAMILY MEDICINE

## 2017-11-22 NOTE — PROGRESS NOTES
"Fartun Mittal is a 47 y.o. female here for a non-provider visit for:   FLU    Reason for immunization: Annual Flu Vaccine  Immunization records indicate need for vaccine: Yes, confirmed with Epic  Minimum interval has been met for this vaccine: Yes  ABN completed: Not Indicated    Order and dose verified by: 8/7/15/ [place]  VIS Dated  8/7/15 was given to patient: Yes  All IAC Questionnaire questions were answered \"No.\"    Patient tolerated injection and no adverse effects were observed or reported: Yes    Pt scheduled for next dose in series: Not Indicated    "

## 2018-02-21 ENCOUNTER — TELEPHONE (OUTPATIENT)
Dept: MEDICAL GROUP | Facility: LAB | Age: 48
End: 2018-02-21

## 2018-02-21 DIAGNOSIS — Z80.8 FAMILY HISTORY OF MELANOMA: ICD-10-CM

## 2018-02-21 DIAGNOSIS — N63.10 LUMP OF RIGHT BREAST: ICD-10-CM

## 2018-02-21 NOTE — TELEPHONE ENCOUNTER
Patient requesting an order for Diagnostic Mammogram she woke up this morning and noticed a lump under the nipple of her right breast.

## 2018-03-03 RX ORDER — ATORVASTATIN CALCIUM 20 MG/1
20 TABLET, FILM COATED ORAL DAILY
Qty: 30 TAB | Refills: 2 | Status: SHIPPED | OUTPATIENT
Start: 2018-03-03 | End: 2018-03-26 | Stop reason: SDUPTHER

## 2018-03-05 ENCOUNTER — HOSPITAL ENCOUNTER (OUTPATIENT)
Dept: RADIOLOGY | Facility: MEDICAL CENTER | Age: 48
End: 2018-03-05
Attending: NURSE PRACTITIONER
Payer: COMMERCIAL

## 2018-03-05 DIAGNOSIS — N63.10 LUMP OF RIGHT BREAST: ICD-10-CM

## 2018-03-05 PROCEDURE — G0279 TOMOSYNTHESIS, MAMMO: HCPCS | Mod: RT

## 2018-03-05 PROCEDURE — 76642 ULTRASOUND BREAST LIMITED: CPT | Mod: RT

## 2018-03-26 DIAGNOSIS — K21.9 GASTROESOPHAGEAL REFLUX DISEASE WITHOUT ESOPHAGITIS: ICD-10-CM

## 2018-03-26 RX ORDER — OMEPRAZOLE 20 MG/1
20 CAPSULE, DELAYED RELEASE ORAL DAILY
Qty: 30 CAP | Refills: 5 | Status: SHIPPED | OUTPATIENT
Start: 2018-03-26 | End: 2019-07-16 | Stop reason: SDUPTHER

## 2018-03-26 RX ORDER — ATORVASTATIN CALCIUM 20 MG/1
20 TABLET, FILM COATED ORAL DAILY
Qty: 30 TAB | Refills: 2 | Status: SHIPPED | OUTPATIENT
Start: 2018-03-26 | End: 2018-09-06

## 2018-04-02 DIAGNOSIS — M25.50 MULTIPLE JOINT PAIN: ICD-10-CM

## 2018-04-02 RX ORDER — TIZANIDINE 4 MG/1
4 TABLET ORAL NIGHTLY PRN
Qty: 30 TAB | Refills: 1 | Status: SHIPPED | OUTPATIENT
Start: 2018-04-02 | End: 2019-02-08 | Stop reason: SDUPTHER

## 2018-07-16 ENCOUNTER — NON-PROVIDER VISIT (OUTPATIENT)
Dept: MEDICAL GROUP | Facility: LAB | Age: 48
End: 2018-07-16
Payer: COMMERCIAL

## 2018-07-16 DIAGNOSIS — Z91.09 ENVIRONMENTAL ALLERGIES: ICD-10-CM

## 2018-07-16 RX ORDER — TRIAMCINOLONE ACETONIDE 40 MG/ML
40 INJECTION, SUSPENSION INTRA-ARTICULAR; INTRAMUSCULAR ONCE
Status: COMPLETED | OUTPATIENT
Start: 2018-07-16 | End: 2018-07-16

## 2018-07-16 RX ADMIN — TRIAMCINOLONE ACETONIDE 40 MG: 40 INJECTION, SUSPENSION INTRA-ARTICULAR; INTRAMUSCULAR at 13:27

## 2018-07-16 NOTE — PROGRESS NOTES
I have placed the below orders and discussed them with Dr. Franco. the MA is performing the below orders under the direction of Dr. SANTOS Barboza pt to make yearly physical appt.

## 2018-07-16 NOTE — PROGRESS NOTES
Fartun Mittal is a 48 y.o. female here for a non-provider visit for kenalog injection.    Reason for injection: seasonal allergies  Order in MAR?: Yes  Patient supplied?:No  Minimum interval has been met for this injection (per MAR order): Yes    Order and dose verified by: AS  Patient tolerated injection and no adverse effects were observed or reported: No    # of Administrations remaining in MAR: 0

## 2018-08-31 ENCOUNTER — TELEPHONE (OUTPATIENT)
Dept: MEDICAL GROUP | Facility: LAB | Age: 48
End: 2018-08-31

## 2018-08-31 NOTE — TELEPHONE ENCOUNTER
ESTABLISHED PATIENT PRE-VISIT PLANNING     Note: Patient will not be contacted if there is no indication to call.     1.  Reviewed notes from the last few office visits within the medical group: Yes    2.  If any orders were placed at last visit or intended to be done for this visit (i.e. 6 mos follow-up), do we have Results/Consult Notes?        •  Labs - Labs were not ordered at last office visit.       •  Imaging - Imaging was not ordered at last office visit.       •  Referrals - No referrals were ordered at last office visit.    3. Is this appointment scheduled as a Hospital Follow-Up? No    4.  Immunizations were updated in Epic using WebIZ?: Epic matches WebIZ       •  Web Iz Recommendations: FLU, MMR  and VARICELLA (Chicken Pox)     5.  Patient is due for the following Health Maintenance Topics:   Health Maintenance Due   Topic Date Due   • IMM PNEUMOCOCCAL 19-64 (ADULT) MEDIUM RISK SERIES (1 of 1 - PPSV23) 01/26/1988   • IMM INFLUENZA (1) 09/01/2018       - Patient has completed TDAP Immunization(s) per WebIZ. Chart has been updated.    6.  MDX printed for Provider? NO    7.  Patient was NOT informed to arrive 15 min prior to their scheduled appointment and bring in their medication bottles.

## 2018-09-04 ENCOUNTER — APPOINTMENT (OUTPATIENT)
Dept: MEDICAL GROUP | Facility: LAB | Age: 48
End: 2018-09-04
Payer: COMMERCIAL

## 2018-09-05 ENCOUNTER — TELEPHONE (OUTPATIENT)
Dept: MEDICAL GROUP | Facility: LAB | Age: 48
End: 2018-09-05

## 2018-09-06 ENCOUNTER — OFFICE VISIT (OUTPATIENT)
Dept: MEDICAL GROUP | Facility: LAB | Age: 48
End: 2018-09-06
Payer: COMMERCIAL

## 2018-09-06 VITALS
RESPIRATION RATE: 12 BRPM | SYSTOLIC BLOOD PRESSURE: 112 MMHG | HEART RATE: 66 BPM | WEIGHT: 195 LBS | BODY MASS INDEX: 30.61 KG/M2 | DIASTOLIC BLOOD PRESSURE: 70 MMHG | OXYGEN SATURATION: 100 % | TEMPERATURE: 97.3 F | HEIGHT: 67 IN

## 2018-09-06 DIAGNOSIS — Z23 NEED FOR PNEUMOCOCCAL VACCINATION: ICD-10-CM

## 2018-09-06 DIAGNOSIS — H61.322: ICD-10-CM

## 2018-09-06 DIAGNOSIS — E78.49 OTHER HYPERLIPIDEMIA: ICD-10-CM

## 2018-09-06 DIAGNOSIS — E66.9 OBESITY (BMI 30-39.9): ICD-10-CM

## 2018-09-06 DIAGNOSIS — G47.31 CENTRAL SLEEP APNEA: ICD-10-CM

## 2018-09-06 DIAGNOSIS — Z12.31 ENCOUNTER FOR SCREENING MAMMOGRAM FOR BREAST CANCER: ICD-10-CM

## 2018-09-06 DIAGNOSIS — Z23 NEED FOR INFLUENZA VACCINATION: ICD-10-CM

## 2018-09-06 DIAGNOSIS — Z00.00 ROUTINE GENERAL MEDICAL EXAMINATION AT A HEALTH CARE FACILITY: ICD-10-CM

## 2018-09-06 DIAGNOSIS — M72.2 PLANTAR FASCIITIS: ICD-10-CM

## 2018-09-06 DIAGNOSIS — G47.33 OSA (OBSTRUCTIVE SLEEP APNEA): ICD-10-CM

## 2018-09-06 PROCEDURE — 90472 IMMUNIZATION ADMIN EACH ADD: CPT | Performed by: NURSE PRACTITIONER

## 2018-09-06 PROCEDURE — 90686 IIV4 VACC NO PRSV 0.5 ML IM: CPT | Performed by: NURSE PRACTITIONER

## 2018-09-06 PROCEDURE — 90732 PPSV23 VACC 2 YRS+ SUBQ/IM: CPT | Performed by: NURSE PRACTITIONER

## 2018-09-06 PROCEDURE — 99396 PREV VISIT EST AGE 40-64: CPT | Mod: 25 | Performed by: NURSE PRACTITIONER

## 2018-09-06 PROCEDURE — 90471 IMMUNIZATION ADMIN: CPT | Performed by: NURSE PRACTITIONER

## 2018-09-06 RX ORDER — METHYLPREDNISOLONE 4 MG/1
TABLET ORAL
Qty: 1 KIT | Refills: 0 | Status: SHIPPED | OUTPATIENT
Start: 2018-09-06 | End: 2019-03-27

## 2018-09-06 RX ORDER — NEOMYCIN SULFATE, POLYMYXIN B SULFATE, HYDROCORTISONE 3.5; 10000; 1 MG/ML; [USP'U]/ML; MG/ML
4 SOLUTION/ DROPS AURICULAR (OTIC) 4 TIMES DAILY
Qty: 1 BOTTLE | Refills: 1 | Status: SHIPPED | OUTPATIENT
Start: 2018-09-06 | End: 2019-03-27

## 2018-09-06 ASSESSMENT — PATIENT HEALTH QUESTIONNAIRE - PHQ9: CLINICAL INTERPRETATION OF PHQ2 SCORE: 0

## 2018-09-14 ENCOUNTER — HOSPITAL ENCOUNTER (OUTPATIENT)
Dept: LAB | Facility: MEDICAL CENTER | Age: 48
End: 2018-09-14
Attending: NURSE PRACTITIONER
Payer: COMMERCIAL

## 2018-09-14 DIAGNOSIS — Z00.00 ROUTINE GENERAL MEDICAL EXAMINATION AT A HEALTH CARE FACILITY: ICD-10-CM

## 2018-09-14 PROCEDURE — 85025 COMPLETE CBC W/AUTO DIFF WBC: CPT

## 2018-09-14 PROCEDURE — 83036 HEMOGLOBIN GLYCOSYLATED A1C: CPT

## 2018-09-14 PROCEDURE — 80053 COMPREHEN METABOLIC PANEL: CPT

## 2018-09-14 PROCEDURE — 80061 LIPID PANEL: CPT

## 2018-09-14 PROCEDURE — 36415 COLL VENOUS BLD VENIPUNCTURE: CPT

## 2018-09-14 PROCEDURE — 84443 ASSAY THYROID STIM HORMONE: CPT

## 2018-09-15 LAB
ALBUMIN SERPL BCP-MCNC: 4.5 G/DL (ref 3.2–4.9)
ALBUMIN/GLOB SERPL: 1.5 G/DL
ALP SERPL-CCNC: 78 U/L (ref 30–99)
ALT SERPL-CCNC: 92 U/L (ref 2–50)
ANION GAP SERPL CALC-SCNC: 9 MMOL/L (ref 0–11.9)
AST SERPL-CCNC: 58 U/L (ref 12–45)
BASOPHILS # BLD AUTO: 1.4 % (ref 0–1.8)
BASOPHILS # BLD: 0.13 K/UL (ref 0–0.12)
BILIRUB SERPL-MCNC: 0.9 MG/DL (ref 0.1–1.5)
BUN SERPL-MCNC: 15 MG/DL (ref 8–22)
CALCIUM SERPL-MCNC: 10 MG/DL (ref 8.5–10.5)
CHLORIDE SERPL-SCNC: 106 MMOL/L (ref 96–112)
CHOLEST SERPL-MCNC: 305 MG/DL (ref 100–199)
CO2 SERPL-SCNC: 27 MMOL/L (ref 20–33)
CREAT SERPL-MCNC: 0.95 MG/DL (ref 0.5–1.4)
EOSINOPHIL # BLD AUTO: 0.7 K/UL (ref 0–0.51)
EOSINOPHIL NFR BLD: 7.3 % (ref 0–6.9)
ERYTHROCYTE [DISTWIDTH] IN BLOOD BY AUTOMATED COUNT: 45.1 FL (ref 35.9–50)
EST. AVERAGE GLUCOSE BLD GHB EST-MCNC: 120 MG/DL
FASTING STATUS PATIENT QL REPORTED: NORMAL
GLOBULIN SER CALC-MCNC: 3.1 G/DL (ref 1.9–3.5)
GLUCOSE SERPL-MCNC: 99 MG/DL (ref 65–99)
HBA1C MFR BLD: 5.8 % (ref 0–5.6)
HCT VFR BLD AUTO: 49.5 % (ref 37–47)
HDLC SERPL-MCNC: 67 MG/DL
HGB BLD-MCNC: 16.5 G/DL (ref 12–16)
IMM GRANULOCYTES # BLD AUTO: 0.05 K/UL (ref 0–0.11)
IMM GRANULOCYTES NFR BLD AUTO: 0.5 % (ref 0–0.9)
LDLC SERPL CALC-MCNC: 211 MG/DL
LYMPHOCYTES # BLD AUTO: 3.48 K/UL (ref 1–4.8)
LYMPHOCYTES NFR BLD: 36.5 % (ref 22–41)
MCH RBC QN AUTO: 32.9 PG (ref 27–33)
MCHC RBC AUTO-ENTMCNC: 33.3 G/DL (ref 33.6–35)
MCV RBC AUTO: 98.6 FL (ref 81.4–97.8)
MONOCYTES # BLD AUTO: 0.77 K/UL (ref 0–0.85)
MONOCYTES NFR BLD AUTO: 8.1 % (ref 0–13.4)
NEUTROPHILS # BLD AUTO: 4.41 K/UL (ref 2–7.15)
NEUTROPHILS NFR BLD: 46.2 % (ref 44–72)
NRBC # BLD AUTO: 0 K/UL
NRBC BLD-RTO: 0 /100 WBC
PLATELET # BLD AUTO: 325 K/UL (ref 164–446)
PMV BLD AUTO: 10.1 FL (ref 9–12.9)
POTASSIUM SERPL-SCNC: 4.2 MMOL/L (ref 3.6–5.5)
PROT SERPL-MCNC: 7.6 G/DL (ref 6–8.2)
RBC # BLD AUTO: 5.02 M/UL (ref 4.2–5.4)
SODIUM SERPL-SCNC: 142 MMOL/L (ref 135–145)
TRIGL SERPL-MCNC: 134 MG/DL (ref 0–149)
TSH SERPL DL<=0.005 MIU/L-ACNC: 2.6 UIU/ML (ref 0.38–5.33)
WBC # BLD AUTO: 9.5 K/UL (ref 4.8–10.8)

## 2018-09-17 ENCOUNTER — PATIENT MESSAGE (OUTPATIENT)
Dept: MEDICAL GROUP | Facility: LAB | Age: 48
End: 2018-09-17

## 2018-09-17 DIAGNOSIS — R79.89 ELEVATED LIVER FUNCTION TESTS: ICD-10-CM

## 2018-09-17 DIAGNOSIS — E78.49 OTHER HYPERLIPIDEMIA: ICD-10-CM

## 2018-09-17 DIAGNOSIS — R73.03 PREDIABETES: ICD-10-CM

## 2018-09-17 RX ORDER — ATORVASTATIN CALCIUM 20 MG/1
20 TABLET, FILM COATED ORAL DAILY
Qty: 30 TAB | Refills: 2 | Status: SHIPPED | OUTPATIENT
Start: 2018-09-17 | End: 2018-12-06 | Stop reason: SDUPTHER

## 2018-09-17 NOTE — TELEPHONE ENCOUNTER
----- Message from Fartun Mittal sent at 9/17/2018  8:51 AM PDT -----  Regarding: Test Result Question  Contact: [phone]  Ernie Mosquera,  I guess I will need to start taking the cholesterol pill again.  Can you send in me a prescription to [facility] [address]?  I typically drink 2-3 drinks a night.   I did  drinking more after and during my divorce.  I will cut back to no longer drinking during the week.  That should help.  ThanksKrissy

## 2018-09-17 NOTE — PROGRESS NOTES
Subjective:      Fartun Mittal is a 48 y.o. female who presents with No chief complaint on file.            HPI    ROS       Objective:     There were no vitals taken for this visit.     Physical Exam            Assessment/Plan:     There are no diagnoses linked to this encounter.

## 2018-09-18 NOTE — TELEPHONE ENCOUNTER
Spoke with pt. Will quit drinking x one month and avoid white carbs.  Will recheck labs in 3 months and follow up here.

## 2018-10-02 ENCOUNTER — HOSPITAL ENCOUNTER (OUTPATIENT)
Dept: RADIOLOGY | Facility: MEDICAL CENTER | Age: 48
End: 2018-10-02
Attending: NURSE PRACTITIONER
Payer: COMMERCIAL

## 2018-10-02 DIAGNOSIS — Z12.31 ENCOUNTER FOR SCREENING MAMMOGRAM FOR BREAST CANCER: ICD-10-CM

## 2018-10-02 PROCEDURE — 77067 SCR MAMMO BI INCL CAD: CPT

## 2018-12-06 RX ORDER — ATORVASTATIN CALCIUM 20 MG/1
20 TABLET, FILM COATED ORAL DAILY
Qty: 30 TAB | Refills: 2 | Status: SHIPPED | OUTPATIENT
Start: 2018-12-06 | End: 2019-03-11 | Stop reason: SDUPTHER

## 2019-02-01 ENCOUNTER — TELEPHONE (OUTPATIENT)
Dept: PULMONOLOGY | Facility: HOSPICE | Age: 49
End: 2019-02-01

## 2019-02-08 DIAGNOSIS — M25.50 MULTIPLE JOINT PAIN: ICD-10-CM

## 2019-02-08 NOTE — TELEPHONE ENCOUNTER
Was the patient seen in the last year in this department? Yes  LOV-9/6/18  Does patient have an active prescription for medications requested? No     Received Request Via: Pharmacy

## 2019-02-10 RX ORDER — TIZANIDINE 4 MG/1
4 TABLET ORAL NIGHTLY PRN
Qty: 30 TAB | Refills: 1 | Status: SHIPPED | OUTPATIENT
Start: 2019-02-10 | End: 2020-07-20 | Stop reason: SDUPTHER

## 2019-02-11 ENCOUNTER — OFFICE VISIT (OUTPATIENT)
Dept: MEDICAL GROUP | Facility: LAB | Age: 49
End: 2019-02-11
Payer: COMMERCIAL

## 2019-02-11 VITALS
SYSTOLIC BLOOD PRESSURE: 120 MMHG | RESPIRATION RATE: 12 BRPM | WEIGHT: 193 LBS | HEART RATE: 80 BPM | OXYGEN SATURATION: 94 % | TEMPERATURE: 98.2 F | DIASTOLIC BLOOD PRESSURE: 70 MMHG | HEIGHT: 67 IN | BODY MASS INDEX: 30.29 KG/M2

## 2019-02-11 DIAGNOSIS — J20.9 ACUTE BRONCHITIS, UNSPECIFIED ORGANISM: ICD-10-CM

## 2019-02-11 PROCEDURE — 99214 OFFICE O/P EST MOD 30 MIN: CPT | Performed by: NURSE PRACTITIONER

## 2019-02-11 RX ORDER — ALBUTEROL SULFATE 90 UG/1
1-2 AEROSOL, METERED RESPIRATORY (INHALATION) EVERY 6 HOURS PRN
Qty: 8.5 G | Refills: 1 | Status: SHIPPED | OUTPATIENT
Start: 2019-02-11 | End: 2019-03-27

## 2019-02-11 RX ORDER — PREDNISONE 20 MG/1
TABLET ORAL
Qty: 6 TAB | Refills: 0 | Status: SHIPPED | OUTPATIENT
Start: 2019-02-11 | End: 2019-03-27

## 2019-02-11 RX ORDER — AMOXICILLIN AND CLAVULANATE POTASSIUM 875; 125 MG/1; MG/1
1 TABLET, FILM COATED ORAL 2 TIMES DAILY
Qty: 14 TAB | Refills: 0 | Status: SHIPPED | OUTPATIENT
Start: 2019-02-11 | End: 2019-03-12 | Stop reason: SDUPTHER

## 2019-02-11 ASSESSMENT — PATIENT HEALTH QUESTIONNAIRE - PHQ9: CLINICAL INTERPRETATION OF PHQ2 SCORE: 0

## 2019-02-11 NOTE — TELEPHONE ENCOUNTER
Left another message for pt that she will need to be seen before an order can be signed    Pt also had [facility] reach out to me for an order and they will advise her that she will need an OV before they can transfer her to [facility]

## 2019-02-11 NOTE — PROGRESS NOTES
"Chief Complaint   Patient presents with   • URI     chest congestion       CAMERON Rey is a 49-year-old established female here with new onset upper respiratory tract infection symptoms, onset 2 weeks ago and worsening.  Her symptoms involve a strong cough, green mucus production from coughing and from her nose, facial pain, ear pressure and overall increased fatigue with body aches.  She tells me that she has had fevers as high as 101.  She is having trouble sleeping because of her cough.  She is taking Mucinex D which does seem helpful.  Positive sick contacts through her daughter and ex-.  She does not have a history of asthma but has seasonal allergies.  She is a teacher.  She does not smoke.  She does have Advair at home from a previous illness.      Past medical, surgical, family, and social history is reviewed and updated in Epic chart by me today.   Medications and allergies reviewed and updated in Epic chart by me today.     ROS:   Denies nausea, vomiting or diarrhea.    Exam:  Blood pressure 120/70, pulse 80, temperature 36.8 °C (98.2 °F), resp. rate 12, height 1.702 m (5' 7.01\"), weight 87.5 kg (193 lb), SpO2 94 %.  Constitutional: Alert, no distress, plus 3 vital signs  Skin:  Warm, dry, no rashes invisible areas  Eye: Equal, round and reactive, conjunctiva clear  ENMT: Bilateral tympanic membranes are retracted but translucent without erythema or perforation. Positive bilateral maxillary sinus tenderness. Oropharynx is slightly injected without exudate. No tonsillar enlargement..    Neck: Mild anterior cervical, nontender lymphadenopathy  Respiratory: Unlabored respiration, expiratory rhonchi and wheezing  Present.    Cardiovascular: Normal rate and rhythm, no murmur, no edema  Psych: Alert, pleasant, well-groomed, normal affect    Assessment / Plan / Medical Decision makin. Acute bronchitis/sinusitis, unspecified organism  -Discussed the importance of high water intake, deep breathing " exercises and getting at least 8 hours of sleep at night.  Encouraged vitamin C.  We will start Augmentin, prednisone and the use of albuterol.  Encouraged her to start Advair 1 puff twice daily for the next 5 days.  She understands the importance of following up with me by the end of this week if her symptoms have not dramatically improved and within 10 days if symptoms have not completely resolved.  - amoxicillin-clavulanate (AUGMENTIN) 875-125 MG Tab; Take 1 Tab by mouth 2 times a day for 7 days.  Dispense: 14 Tab; Refill: 0  - predniSONE (DELTASONE) 20 MG Tab; Take two pills day one with food, one pill day 2-5 with food in the morning.  Dispense: 6 Tab; Refill: 0  - albuterol 108 (90 Base) MCG/ACT Aero Soln inhalation aerosol; Inhale 1-2 Puffs by mouth every 6 hours as needed for Shortness of Breath.  Dispense: 8.5 g; Refill: 1

## 2019-02-13 ENCOUNTER — PATIENT MESSAGE (OUTPATIENT)
Dept: SLEEP MEDICINE | Facility: MEDICAL CENTER | Age: 49
End: 2019-02-13

## 2019-02-13 DIAGNOSIS — G47.33 OSA (OBSTRUCTIVE SLEEP APNEA): ICD-10-CM

## 2019-02-13 NOTE — TELEPHONE ENCOUNTER
----- Message from Fartun Mittal sent at 2019  9:05 AM PST -----  Regarding: Non-Urgent Medical Question  Contact: [phone]  [facility]. [facility], I am feeling extremely frustrated.  I called [facility]  for  a refill of my 6 month Cpac supplies.   Meanwhile, my prescription  . I then called Meggan, your medical assistant, a week and a half ago to ask for help.  I must have called 8 different days and left messages each time.  She returned my call once, left a message I needed to make an appointment.  I have tried tirelessly, but she has not returned my messages.   I need a new prescription given to [facility] I need to make an appointment to check in with you.  It has now been 6 week since I have tried to get my 6 month supplies, I feel like crying.  Please let me know what you can do.  Thank you,  Krissy Mittal

## 2019-02-13 NOTE — PATIENT COMMUNICATION
Sent pt message via [employer] encouraged to call [phone] pass over due for appt.     Last Seen 7/6/17 KIM Russell   PLAN:   Patient Instructions   1) Continue ASV at 7/5/46dkD23   2) Clean mask and supplies weekly and change them as insurance allows  3) Vaccines: Up to date with flu  4) Return in about 6 months (around 1/6/2018) for Compliance, review of symptoms, if not sooner, follow up with [facility] De [facility].     No pending appt

## 2019-02-18 NOTE — TELEPHONE ENCOUNTER
Left detailed message for pt to call me and schedule a follow up so I can get her switched to [facility] or [facility]   OT CANCELLATION NOTE    Pt is currently off the floor at dialysis  OT will cont to follow as clinically appropriate          Nilda Lovell, OTR/L

## 2019-03-11 RX ORDER — ATORVASTATIN CALCIUM 20 MG/1
20 TABLET, FILM COATED ORAL DAILY
Qty: 30 TAB | Refills: 5 | Status: SHIPPED | OUTPATIENT
Start: 2019-03-11 | End: 2019-09-20 | Stop reason: SDUPTHER

## 2019-03-11 NOTE — TELEPHONE ENCOUNTER
Was the patient seen in the last year in this department? Yes LOV 2/811/19    Does patient have an active prescription for medications requested? No     Received Request Via: Pharmacy

## 2019-03-12 DIAGNOSIS — J20.9 ACUTE BRONCHITIS, UNSPECIFIED ORGANISM: ICD-10-CM

## 2019-03-12 RX ORDER — AMOXICILLIN AND CLAVULANATE POTASSIUM 875; 125 MG/1; MG/1
1 TABLET, FILM COATED ORAL 2 TIMES DAILY
Qty: 14 TAB | Refills: 0 | Status: SHIPPED | OUTPATIENT
Start: 2019-03-12 | End: 2019-03-13

## 2019-03-13 RX ORDER — DOXYCYCLINE HYCLATE 100 MG
100 TABLET ORAL 2 TIMES DAILY
Qty: 14 TAB | Refills: 0 | Status: SHIPPED | OUTPATIENT
Start: 2019-03-13 | End: 2019-03-20

## 2019-03-27 ENCOUNTER — SLEEP CENTER VISIT (OUTPATIENT)
Dept: SLEEP MEDICINE | Facility: MEDICAL CENTER | Age: 49
End: 2019-03-27
Payer: COMMERCIAL

## 2019-03-27 VITALS
HEART RATE: 52 BPM | BODY MASS INDEX: 30.45 KG/M2 | OXYGEN SATURATION: 99 % | SYSTOLIC BLOOD PRESSURE: 116 MMHG | TEMPERATURE: 97.7 F | RESPIRATION RATE: 16 BRPM | HEIGHT: 67 IN | WEIGHT: 194 LBS | DIASTOLIC BLOOD PRESSURE: 72 MMHG

## 2019-03-27 DIAGNOSIS — E66.9 OBESITY (BMI 30-39.9): ICD-10-CM

## 2019-03-27 DIAGNOSIS — G47.33 OSA (OBSTRUCTIVE SLEEP APNEA): ICD-10-CM

## 2019-03-27 DIAGNOSIS — G47.31 CENTRAL SLEEP APNEA: ICD-10-CM

## 2019-03-27 PROCEDURE — 99213 OFFICE O/P EST LOW 20 MIN: CPT | Performed by: NURSE PRACTITIONER

## 2019-03-27 NOTE — PATIENT INSTRUCTIONS
1) Continue ASV at 7/5/27ixS41   2) Clean mask and supplies weekly and change them as insurance allows  3) Vaccines: Up to date with flu  4) Continue regular exercise  5) Return in about 1 year (around 3/27/2020) for follow up with K[facility] De Anda, if not sooner, review of symptoms, Compliance.

## 2019-03-27 NOTE — PROGRESS NOTES
CC:  Here for f/u sleep issues as listed below    HPI:   Fartun presents today for follow up obstructive sleep apnea, central sleep apnea.  PSG from 12/2016 indicated an AHI of 120.6 and low oxygen of 78%. A titration sleep study and was completed 1-2017 due to central apneas. Currently she is being treated with ASV @ EPAP of 8 with PS cmH20.  Compliance download from the dates 2/25/2019 - 3/26/2019 indicates she is wearing the device 100% for an avg of 9 hours and 5 minutes per night with a reduced AHI of 1.0.      She does tolerate pressure and mask well.  She loves her machine! She wakes up refreshed and denies morning H/A. she sleeps better overall. She is amazed how much better she feels. Her BP has decreased. Her total cholesterol dropped by 30 points. she will continue to clean supplies weekly and change them as insurance allows. 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 BMI is 30. Changed diet and rides bicycle weekly, working out in gym and with boyfriend who is also very active.     Patient Active Problem List    Diagnosis Date Noted   • Obesity (BMI 30-39.9) 09/06/2018   • Family history of melanoma 02/21/2018   • CARI (obstructive sleep apnea) 12/08/2016   • Central sleep apnea 12/08/2016   • Chronic sinusitis 11/11/2016   • Apnea 11/11/2016   • Aspirin-sensitive asthma with nasal polyps 08/18/2016   • Menopause 03/19/2014   • GERD (gastroesophageal reflux disease) 03/19/2014   • Ocular migraine 01/18/2012   • Hyperlipidemia 01/18/2012   • Anxiety        Past Medical History:   Diagnosis Date   • Allergic rhinitis    • Allergy    • Anxiety    • Chickenpox    • Heart burn    • Influenza    • Psychiatric problem     depression   • Sleep apnea    • Snoring     sleep apnea questionairre completed   • Tubal ligation        Past Surgical History:   Procedure Laterality Date   • BUNIONECTOMY  5/18/2011    Performed by [facility]   •  CHEILECTOMY  5/18/2011    Performed by MOLLY GARCIA at SURGERY [facility]   • BONE SPUR EXCISION  5/18/2011    Performed by MOLLY GARCIA at SURGERY [facility]   • ESWT  5/18/2011    Performed by MOLLY GARCIA at SURGERY [facility]   • ABDOMINOPLASTY  2009   • TUBAL COAGULATION LAPAROSCOPIC BILATERAL  2007   • APPENDECTOMY  3/2003   • FOOT ORIF  1988    left   • TONSILLECTOMY  1981       Family History   Problem Relation Age of Onset   • Cancer Father         prostate   • Hypertension Father    • Arthritis Father    • Prostate cancer Father    • Cancer Sister         melanoma   • Cancer Paternal Aunt         breast   • Diabetes Unknown    • Sleep Apnea Neg Hx        Social History     Social History   • Marital status:      Spouse name: N/A   • Number of children: N/A   • Years of education: N/A     Occupational History   • Not on file.     Social History Main Topics   • Smoking status: Never Smoker   • Smokeless tobacco: Never Used   • Alcohol use 4.2 oz/week     7 Glasses of wine per week      Comment: once a day   • Drug use: No   • 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      Comment: , 2 children, teacher     Other Topics Concern   • Not on file     Social History Narrative   • No narrative on file       Current Outpatient Prescriptions   Medication Sig Dispense Refill   • atorvastatin (LIPITOR) 20 MG Tab Take 1 Tab by mouth every day. 30 Tab 5   • omeprazole (PRILOSEC) 20 MG delayed-release capsule Take 1 Cap by mouth every day. 30 Cap 5   • montelukast (SINGULAIR) 10 MG Tab Take 1 Tab by mouth every day. 30 Tab 11   • tizanidine (ZANAFLEX) 4 MG Tab Take 1 Tab by mouth at bedtime as needed (joint pain / muscle tightness). 30 Tab 1   • NON SPECIFIED Plantar fascia splint 1 Each 1   • epinephrine (EPIPEN) 0.3 MG/0.3ML (1:1000) injection 0.3 mL by Intramuscular route Once PRN for 1 dose. 1 Each 3     No current facility-administered medications for this visit.      "      Allergies: Asa [aspirin]; Nsaids; Sulfa drugs; Augmentin; and Contrast media with iodine [iodine]      ROS   Gen: Denies fever, chills, unintentional weight loss, fatigue  Resp:Denies Dyspnea  CV: Denies chest pain, chest tightness  Sleep:Denies morning headache, insomnia, daytime somnolence, snoring, gasping for air, apnea  Neuro: Denies frequent headaches, weakness, dizziness  See HPI.  All other systems reviewed and negative        Vital signs for this encounter:  Vitals:    03/27/19 1300 03/27/19 1319   Height:  1.702 m (5' 7\")   Weight:  88 kg (194 lb)   Weight % change since last entry.:  0 %   BP:  116/72   Pulse:  (!) 52   BMI (Calculated):  30.38   Resp:  16   Temp:  36.5 °C (97.7 °F)   TempSrc:  Temporal   O2 sat % room air: 99 %                    Physical Exam:   Gen:         Alert and oriented, No apparent distress.   Neck:        No Lymphadenopathy.  Lungs:     Clear to auscultation bilaterally.    CV:          Regular rate and rhythm. No murmurs, rubs or gallops.   Abd:         Soft non tender, non distended.            Ext:          No clubbing, cyanosis, edema.    Assessment   1. Central sleep apnea  DME Mask and Supplies   2. Obesity (BMI 30-39.9)     3. CARI (obstructive sleep apnea)         Patient is clinically stable and will proceed with following plan.     PLAN:   Patient Instructions   1) Continue ASV at 7/5/72cyG98   2) Clean mask and supplies weekly and change them as insurance allows  3) Vaccines: Up to date with flu  4) Continue regular exercise and continue with diet changes  5) Return in about 1 year (around 3/27/2020) for follow up with KIM De Anda, if not sooner, review of symptoms, Compliance.        "

## 2019-04-08 ENCOUNTER — PATIENT MESSAGE (OUTPATIENT)
Dept: SLEEP MEDICINE | Facility: MEDICAL CENTER | Age: 49
End: 2019-04-08

## 2019-04-08 NOTE — TELEPHONE ENCOUNTER
----- Message from Fartun Mittal sent at 4/8/2019  8:57 AM PDT -----  Regarding: Non-Urgent Medical Question  Contact: [phone]  Hi,  Can you remind me of the name of the new medical supplier for my CPAC machine?  I lost the card.  Thanks,  Connor

## 2019-05-15 ENCOUNTER — NON-PROVIDER VISIT (OUTPATIENT)
Dept: MEDICAL GROUP | Facility: LAB | Age: 49
End: 2019-05-15
Payer: COMMERCIAL

## 2019-05-15 DIAGNOSIS — J30.2 SEASONAL ALLERGIES: ICD-10-CM

## 2019-05-15 RX ORDER — TRIAMCINOLONE ACETONIDE 40 MG/ML
40 INJECTION, SUSPENSION INTRA-ARTICULAR; INTRAMUSCULAR ONCE
Status: COMPLETED | OUTPATIENT
Start: 2019-05-15 | End: 2019-05-15

## 2019-05-15 RX ADMIN — TRIAMCINOLONE ACETONIDE 40 MG: 40 INJECTION, SUSPENSION INTRA-ARTICULAR; INTRAMUSCULAR at 08:45

## 2019-05-15 NOTE — PROGRESS NOTES
I have placed the below orders and discussed them with Dr. Franco. the MA is performing the below orders under the direction of Dr. GRAHAM

## 2019-05-15 NOTE — NON-PROVIDER
Fartun Mittal is a 49 y.o. female here for a non-provider visit for Kenalog injection.    Reason for injection: allergies  Order in MAR?: Yes  Patient supplied?:No  Minimum interval has been met for this injection (per MAR order): Yes    Order and dose verified by: as  Patient tolerated injection and no adverse effects were observed or reported: Yes    # of Administrations remaining in MAR: 0

## 2019-06-20 ENCOUNTER — HOSPITAL ENCOUNTER (OUTPATIENT)
Dept: LAB | Facility: MEDICAL CENTER | Age: 49
End: 2019-06-20
Attending: NURSE PRACTITIONER
Payer: COMMERCIAL

## 2019-06-20 DIAGNOSIS — R79.89 ELEVATED LIVER FUNCTION TESTS: ICD-10-CM

## 2019-06-20 DIAGNOSIS — R73.03 PREDIABETES: ICD-10-CM

## 2019-06-20 DIAGNOSIS — E78.49 OTHER HYPERLIPIDEMIA: ICD-10-CM

## 2019-06-20 LAB
ALBUMIN SERPL BCP-MCNC: 4.5 G/DL (ref 3.2–4.9)
ALP SERPL-CCNC: 73 U/L (ref 30–99)
ALT SERPL-CCNC: 29 U/L (ref 2–50)
AST SERPL-CCNC: 22 U/L (ref 12–45)
BILIRUB CONJ SERPL-MCNC: 0.2 MG/DL (ref 0.1–0.5)
BILIRUB INDIRECT SERPL-MCNC: 0.6 MG/DL (ref 0–1)
BILIRUB SERPL-MCNC: 0.8 MG/DL (ref 0.1–1.5)
CHOLEST SERPL-MCNC: 165 MG/DL (ref 100–199)
EST. AVERAGE GLUCOSE BLD GHB EST-MCNC: 117 MG/DL
FASTING STATUS PATIENT QL REPORTED: NORMAL
HBA1C MFR BLD: 5.7 % (ref 0–5.6)
HDLC SERPL-MCNC: 56 MG/DL
LDLC SERPL CALC-MCNC: 93 MG/DL
PROT SERPL-MCNC: 7.1 G/DL (ref 6–8.2)
TRIGL SERPL-MCNC: 78 MG/DL (ref 0–149)

## 2019-06-20 PROCEDURE — 36415 COLL VENOUS BLD VENIPUNCTURE: CPT

## 2019-06-20 PROCEDURE — 80061 LIPID PANEL: CPT

## 2019-06-20 PROCEDURE — 83036 HEMOGLOBIN GLYCOSYLATED A1C: CPT

## 2019-06-20 PROCEDURE — 80076 HEPATIC FUNCTION PANEL: CPT

## 2019-06-24 ENCOUNTER — OFFICE VISIT (OUTPATIENT)
Dept: MEDICAL GROUP | Facility: LAB | Age: 49
End: 2019-06-24
Payer: COMMERCIAL

## 2019-06-24 VITALS
HEIGHT: 67 IN | DIASTOLIC BLOOD PRESSURE: 84 MMHG | WEIGHT: 191 LBS | RESPIRATION RATE: 12 BRPM | TEMPERATURE: 98 F | HEART RATE: 70 BPM | BODY MASS INDEX: 29.98 KG/M2 | SYSTOLIC BLOOD PRESSURE: 128 MMHG | OXYGEN SATURATION: 97 %

## 2019-06-24 DIAGNOSIS — E66.3 OVERWEIGHT: ICD-10-CM

## 2019-06-24 DIAGNOSIS — Z80.8 FAMILY HISTORY OF MELANOMA: ICD-10-CM

## 2019-06-24 DIAGNOSIS — R73.01 IMPAIRED FASTING GLUCOSE: ICD-10-CM

## 2019-06-24 DIAGNOSIS — Z12.11 SPECIAL SCREENING FOR MALIGNANT NEOPLASM OF COLON: ICD-10-CM

## 2019-06-24 DIAGNOSIS — B00.9 HSV-2 INFECTION: ICD-10-CM

## 2019-06-24 PROCEDURE — 99214 OFFICE O/P EST MOD 30 MIN: CPT | Performed by: NURSE PRACTITIONER

## 2019-06-24 RX ORDER — PHENTERMINE HYDROCHLORIDE 37.5 MG/1
37.5 TABLET ORAL
Qty: 30 TAB | Refills: 0 | Status: SHIPPED
Start: 2019-06-24 | End: 2019-07-23 | Stop reason: SDUPTHER

## 2019-06-24 RX ORDER — VALACYCLOVIR HYDROCHLORIDE 1 G/1
1000 TABLET, FILM COATED ORAL DAILY
Qty: 30 TAB | Refills: 0 | Status: SHIPPED | OUTPATIENT
Start: 2019-06-24 | End: 2019-07-16 | Stop reason: SDUPTHER

## 2019-06-24 NOTE — PROGRESS NOTES
"CC  hsv concern     CAMERON Rey is a 49-year-old established female here with several issues:  #1- herpes:  Chronic issue dx 30 yrs ago with very rare outbreak since until last month.   Unfortunately she is had 2 outbreaks in the past 30 days and is wondering what she can do about this.  She has been under a lot of stress with her current job.  She is sexually active with one partner.  #2- family hx of melanoma: She has a few moles that she would like me to take a peek at.  She is also followed by dermatology once a year.  Denies any moles that are bleeding.  #3-prediabetes: This is a chronic issue.  Fortunately her A1c dropped from 5.8 to 5.7% over the past year.  She is exercising 3 to 4 days/week and avoiding white carbohydrates.  #4- hyperlipidemia: She is very happy with her recent cholesterol results.  As above, she is exercising regularly and eating healthy.  #5- overweight: Unfortunately she is very frustrated with her weight.  She is requesting medication to help her lose weight.  She is exercising regularly and eating healthy, frustrated that she has only lost 8 pounds in the past year.    Past medical, surgical, family, and social history is reviewed and updated in Epic chart by me today.   Medications and allergies reviewed and updated in Epic chart by me today.     ROS:   As documented in history of present illness above    Exam:  /84 (BP Location: Left arm, Patient Position: Sitting, BP Cuff Size: Large adult)   Pulse 70   Temp 36.7 °C (98 °F)   Resp 12   Ht 1.702 m (5' 7\")   Wt 86.6 kg (191 lb)   SpO2 97%   Constitutional: Alert, no distress, plus 3 vital signs  Skin:  Warm, dry, no rashes invisible areas  Eye: Equal, round and reactive, conjunctiva clear  ENMT: Lips without lesions  Respiratory: Unlabored respiration, lungs clear to auscultation, no wheezes, no rhonchi  Cardiovascular: Normal rate and rhythm  Psych: Alert, pleasant, well-groomed, normal affect    Assessment / Plan / Medical " Decision makin. HSV-2 infection  -Recommend 30 days of valacyclovir preventatively and then going to as needed valacyclovir for outbreaks.  She understands that HSV-2 is contagious.  - valacyclovir (VALTREX) 1 GM Tab; Take 1 Tab by mouth every day.  Dispense: 30 Tab; Refill: 0    2. Special screening for malignant neoplasm of colon  - REFERRAL TO GASTROENTEROLOGY    3. Overweight  -Encouraged her to increase her exercise frequency to 5 to 6 days/week for at least 30 to 45 minutes and avoid all white carbohydrates.  She is very adamant about wanting to try a weight loss medication.  Discussed risk versus benefit of phentermine with her including potential increased risk of anxiety, hypertension, tachycardia and insomnia.  I will see her back in 1 month to check her blood pressure and her heart rate.  She will begin with by taking a half of a pill of phentermine.  - phentermine (ADIPEX-P) 37.5 MG tablet; Take 1 Tab by mouth every morning before breakfast for 30 days.  Dispense: 30 Tab; Refill: 0    4. Family history of melanoma  -No concerning moles today.  Referred to dermatology for her routine checkup.  - REFERRAL TO DERMATOLOGY    5. Impaired fasting glucose  -A1c is improved.  She will continue with a low carbohydrate, high protein, vegetable-based diet and exercise.  Recheck 6 months.

## 2019-06-28 ENCOUNTER — PATIENT MESSAGE (OUTPATIENT)
Dept: SLEEP MEDICINE | Facility: MEDICAL CENTER | Age: 49
End: 2019-06-28

## 2019-07-01 ENCOUNTER — PATIENT MESSAGE (OUTPATIENT)
Dept: SLEEP MEDICINE | Facility: MEDICAL CENTER | Age: 49
End: 2019-07-01

## 2019-07-01 NOTE — PATIENT COMMUNICATION
Last OV:03/27/19 with Rakesh NP  PLAN:   Patient Instructions   1) Continue ASV at 7/5/37raB23   2) Clean mask and supplies weekly and change them as insurance allows  3) Vaccines: Up to date with flu  4) Continue regular exercise and continue with diet changes  5) Return in about 1 year (around 3/27/2020) for follow up with KIM De Anda, if not sooner, review of symptoms, Compliance.       Next OV: No Pending appt    DX:CARI    Type: ASV 7/5/22xcP36

## 2019-07-01 NOTE — TELEPHONE ENCOUNTER
From: Fartun Mittal  To: KIMBERLY Jones  Sent: 6/28/2019 9:55 AM PDT  Subject: Non-Urgent Medical Question      Ernie Shin,  Can I get a prescription for this?  Thank you,  Krissy Blakely with P10 Setup Pack and Mask (DOWN PAYMENT)    ADD TRAVEL CASE?: Add Travel Case ($32.00)  NEED AN RX?: I will send/have sent my Rx  Payment Plan: I accept 12 monthly payments.

## 2019-07-08 ENCOUNTER — PATIENT MESSAGE (OUTPATIENT)
Dept: SLEEP MEDICINE | Facility: MEDICAL CENTER | Age: 49
End: 2019-07-08

## 2019-07-16 DIAGNOSIS — K21.9 GASTROESOPHAGEAL REFLUX DISEASE WITHOUT ESOPHAGITIS: ICD-10-CM

## 2019-07-16 DIAGNOSIS — B00.9 HSV-2 INFECTION: ICD-10-CM

## 2019-07-16 RX ORDER — VALACYCLOVIR HYDROCHLORIDE 1 G/1
1000 TABLET, FILM COATED ORAL 2 TIMES DAILY
Qty: 60 TAB | Refills: 5 | Status: SHIPPED | OUTPATIENT
Start: 2019-07-16 | End: 2019-10-24 | Stop reason: SDUPTHER

## 2019-07-16 RX ORDER — OMEPRAZOLE 20 MG/1
20 CAPSULE, DELAYED RELEASE ORAL DAILY
Qty: 30 CAP | Refills: 5 | Status: SHIPPED
Start: 2019-07-16 | End: 2020-04-07

## 2019-07-16 NOTE — TELEPHONE ENCOUNTER
Was the patient seen in the last year in this department? Yes  6/24/19  Does patient have an active prescription for medications requested? No     Received Request Via: Pharmacy

## 2019-07-23 DIAGNOSIS — E66.3 OVERWEIGHT: ICD-10-CM

## 2019-07-23 NOTE — TELEPHONE ENCOUNTER
Was the patient seen in the last year in this department? Yes  6/24/19  6/24/19  Does patient have an active prescription for medications requested? No     Received Request Via: Patient

## 2019-07-23 NOTE — TELEPHONE ENCOUNTER
----- Message from Fartun Mittal sent at 7/23/2019  2:05 PM PDT -----  Regarding: Non-Urgent Medical Question  Contact: [phone]  Ernie Mosquera,  Can I get a refill of my diet pill?  I only have two left.  I have an appointment with you Monday:)  Thanks,  Krissy

## 2019-07-24 RX ORDER — PHENTERMINE HYDROCHLORIDE 37.5 MG/1
37.5 TABLET ORAL
Qty: 30 TAB | Refills: 0 | Status: SHIPPED
Start: 2019-07-24 | End: 2019-07-29 | Stop reason: SDUPTHER

## 2019-07-29 ENCOUNTER — OFFICE VISIT (OUTPATIENT)
Dept: MEDICAL GROUP | Facility: LAB | Age: 49
End: 2019-07-29
Payer: COMMERCIAL

## 2019-07-29 VITALS
DIASTOLIC BLOOD PRESSURE: 70 MMHG | RESPIRATION RATE: 12 BRPM | OXYGEN SATURATION: 98 % | WEIGHT: 186 LBS | SYSTOLIC BLOOD PRESSURE: 116 MMHG | HEIGHT: 67 IN | HEART RATE: 52 BPM | BODY MASS INDEX: 29.19 KG/M2 | TEMPERATURE: 97.6 F

## 2019-07-29 DIAGNOSIS — R73.01 IMPAIRED FASTING GLUCOSE: ICD-10-CM

## 2019-07-29 DIAGNOSIS — E66.3 OVERWEIGHT: ICD-10-CM

## 2019-07-29 DIAGNOSIS — F41.9 ANXIETY: ICD-10-CM

## 2019-07-29 PROBLEM — E66.9 OBESITY (BMI 30-39.9): Status: RESOLVED | Noted: 2018-09-06 | Resolved: 2019-07-29

## 2019-07-29 PROCEDURE — 99213 OFFICE O/P EST LOW 20 MIN: CPT | Performed by: NURSE PRACTITIONER

## 2019-07-29 RX ORDER — PHENTERMINE HYDROCHLORIDE 37.5 MG/1
37.5 TABLET ORAL
Qty: 30 TAB | Refills: 1 | Status: SHIPPED
Start: 2019-08-22 | End: 2019-09-27 | Stop reason: SDUPTHER

## 2019-07-29 NOTE — PROGRESS NOTES
"Chief Complaint   Patient presents with   • Other     follow up medication weight       HPI  Krissy is a 49-year-old established female here to follow-up on initiation of phentermine.  She has been able to lose 5 pounds since our visit 1 month ago.  Denies any negative side effects of phentermine, particularly denies any exacerbations of anxiety, insomnia or new onset heart palpitations/chest pain.  She has been able to control her portions a bit better since being on phentermine.  Continues to exercise.  Goal is a weight of 170 pounds.    Past medical, surgical, family, and social history is reviewed and updated in Epic chart by me today.   Medications and allergies reviewed and updated in Epic chart by me today.     ROS:   As documented in history of present illness above    Exam:  /70 (BP Location: Left arm, Patient Position: Sitting, BP Cuff Size: Large adult)   Pulse (!) 52   Temp 36.4 °C (97.6 °F)   Resp 12   Ht 1.702 m (5' 7\")   Wt 84.4 kg (186 lb)   SpO2 98%   Constitutional: Alert, no distress, plus 3 vital signs  Skin:  Warm, dry, no rashes invisible areas  Eye: Equal, round and reactive, conjunctiva clear  ENMT: Lips without lesions, good dentition, oropharynx clear    Neck: Trachea midline, no masses, no thyromegaly  Respiratory: Unlabored respiration, lungs clear to auscultation, no wheezes, no rhonchi  Cardiovascular: Normal rate and rhythm, no murmur, no edema  Abdomen: Soft, nontender, no masses or hepatosplenomegaly  Psych: Alert, pleasant, well-groomed, normal affect    Assessment / Plan / Medical Decision makin. Overweight  -Improving.  BMI is down to 29.  She has lost 5 pounds.  Goal of losing another 16 pounds.  Discussed portion control and exercise.  Denies any negative side effects of phentermine.  - phentermine (ADIPEX-P) 37.5 MG tablet; Take 1 Tab by mouth every morning before breakfast for 30 days.  Dispense: 30 Tab; Refill: 1    2. Impaired fasting glucose  -Recommend a " repeat A1c in 3 months here in our office.    3. Anxiety  -Stable.    Hcm:   Colonoscopy end of next week

## 2019-08-19 ENCOUNTER — NON-PROVIDER VISIT (OUTPATIENT)
Dept: MEDICAL GROUP | Facility: LAB | Age: 49
End: 2019-08-19
Payer: COMMERCIAL

## 2019-08-19 DIAGNOSIS — Z91.09 ENVIRONMENTAL ALLERGIES: ICD-10-CM

## 2019-08-19 PROCEDURE — 96372 THER/PROPH/DIAG INJ SC/IM: CPT | Performed by: NURSE PRACTITIONER

## 2019-08-19 RX ORDER — TRIAMCINOLONE ACETONIDE 40 MG/ML
40 INJECTION, SUSPENSION INTRA-ARTICULAR; INTRAMUSCULAR ONCE
Status: COMPLETED | OUTPATIENT
Start: 2019-08-19 | End: 2019-08-19

## 2019-08-19 RX ADMIN — TRIAMCINOLONE ACETONIDE 40 MG: 40 INJECTION, SUSPENSION INTRA-ARTICULAR; INTRAMUSCULAR at 08:28

## 2019-08-19 NOTE — PROGRESS NOTES
Fartun Mittal is a 49 y.o. female here for a non-provider visit for KENALOG    If abnormal was an in office provider notified today (if so, indicate provider)? Yes  Routed to PCP? No

## 2019-09-10 ENCOUNTER — HOSPITAL ENCOUNTER (OUTPATIENT)
Dept: LAB | Facility: MEDICAL CENTER | Age: 49
End: 2019-09-10
Attending: INTERNAL MEDICINE
Payer: COMMERCIAL

## 2019-09-10 LAB
BASOPHILS # BLD AUTO: 1 % (ref 0–1.8)
BASOPHILS # BLD: 0.08 K/UL (ref 0–0.12)
EOSINOPHIL # BLD AUTO: 0.27 K/UL (ref 0–0.51)
EOSINOPHIL NFR BLD: 3.2 % (ref 0–6.9)
ERYTHROCYTE [DISTWIDTH] IN BLOOD BY AUTOMATED COUNT: 48.1 FL (ref 35.9–50)
FERRITIN SERPL-MCNC: 138 NG/ML (ref 10–291)
HCT VFR BLD AUTO: 44.7 % (ref 37–47)
HGB BLD-MCNC: 15.3 G/DL (ref 12–16)
IMM GRANULOCYTES # BLD AUTO: 0.02 K/UL (ref 0–0.11)
IMM GRANULOCYTES NFR BLD AUTO: 0.2 % (ref 0–0.9)
IRON SATN MFR SERPL: 43 % (ref 15–55)
IRON SERPL-MCNC: 134 UG/DL (ref 40–170)
LYMPHOCYTES # BLD AUTO: 2.21 K/UL (ref 1–4.8)
LYMPHOCYTES NFR BLD: 26.4 % (ref 22–41)
MCH RBC QN AUTO: 34.3 PG (ref 27–33)
MCHC RBC AUTO-ENTMCNC: 34.2 G/DL (ref 33.6–35)
MCV RBC AUTO: 100.2 FL (ref 81.4–97.8)
MONOCYTES # BLD AUTO: 0.75 K/UL (ref 0–0.85)
MONOCYTES NFR BLD AUTO: 8.9 % (ref 0–13.4)
NEUTROPHILS # BLD AUTO: 5.05 K/UL (ref 2–7.15)
NEUTROPHILS NFR BLD: 60.3 % (ref 44–72)
NRBC # BLD AUTO: 0 K/UL
NRBC BLD-RTO: 0 /100 WBC
PLATELET # BLD AUTO: 263 K/UL (ref 164–446)
PMV BLD AUTO: 10 FL (ref 9–12.9)
RBC # BLD AUTO: 4.46 M/UL (ref 4.2–5.4)
TIBC SERPL-MCNC: 314 UG/DL (ref 250–450)
WBC # BLD AUTO: 8.4 K/UL (ref 4.8–10.8)

## 2019-09-10 PROCEDURE — 82728 ASSAY OF FERRITIN: CPT

## 2019-09-10 PROCEDURE — 83540 ASSAY OF IRON: CPT

## 2019-09-10 PROCEDURE — 83550 IRON BINDING TEST: CPT

## 2019-09-10 PROCEDURE — 85025 COMPLETE CBC W/AUTO DIFF WBC: CPT

## 2019-09-10 PROCEDURE — 36415 COLL VENOUS BLD VENIPUNCTURE: CPT

## 2019-09-17 ENCOUNTER — OFFICE VISIT (OUTPATIENT)
Dept: URGENT CARE | Facility: CLINIC | Age: 49
End: 2019-09-17
Payer: COMMERCIAL

## 2019-09-17 VITALS
SYSTOLIC BLOOD PRESSURE: 112 MMHG | RESPIRATION RATE: 18 BRPM | HEIGHT: 67 IN | OXYGEN SATURATION: 96 % | DIASTOLIC BLOOD PRESSURE: 70 MMHG | HEART RATE: 72 BPM | TEMPERATURE: 98 F | WEIGHT: 182 LBS | BODY MASS INDEX: 28.56 KG/M2

## 2019-09-17 DIAGNOSIS — H10.33 ACUTE BACTERIAL CONJUNCTIVITIS OF BOTH EYES: Primary | ICD-10-CM

## 2019-09-17 PROCEDURE — 99214 OFFICE O/P EST MOD 30 MIN: CPT | Performed by: PHYSICIAN ASSISTANT

## 2019-09-17 RX ORDER — POLYMYXIN B SULFATE AND TRIMETHOPRIM 1; 10000 MG/ML; [USP'U]/ML
1 SOLUTION OPHTHALMIC EVERY 4 HOURS
Qty: 10 ML | Refills: 0 | Status: SHIPPED | OUTPATIENT
Start: 2019-09-17 | End: 2020-11-22

## 2019-09-17 NOTE — PROGRESS NOTES
"Subjective:      Pt is a 49 y.o. female who presents with Eye Problem (Since yesterday eye swollen , today discharge)            HPI  This is a new problem. Pt presents to the urgent care today with a CC of a watery, swollen, red right eye. Pt states this started yesterday morning. Pt states the pain in the eye is 3/10, mostly feels like pressure and aching with redness and yellow matting. Admits to the eye feeling \"itchy\" and that vision is slightly blurred. Pt denies double vision. Pt denies CP, SOB, NVD, paresthesias, headaches, dizziness, hives, or joint pain. Pt has not taken any medications for this condition. The pt's medication list, problem list, and allergies have been evaluated and reviewed during today's visit.    PMH:  Past Medical History:   Diagnosis Date   • Allergic rhinitis    • Allergy    • Anxiety    • Chickenpox    • Heart burn    • Influenza    • Psychiatric problem     depression   • Sleep apnea    • Snoring     sleep apnea questionairre completed   • Tubal ligation        PSH:  Past Surgical History:   Procedure Laterality Date   • BUNIONECTOMY  5/18/2011    Performed by MOLLY GARCIA at [facility] ORS   • CHEILECTOMY  5/18/2011    Performed by MOLLY GARCIA at [facility] ORS   • BONE SPUR EXCISION  5/18/2011    Performed by MOLLY GARCIA at [facility] ORS   • ESWT  5/18/2011    Performed by MOLLY GARCIA at [facility] ORS   • ABDOMINOPLASTY  2009   • TUBAL COAGULATION LAPAROSCOPIC BILATERAL  2007   • APPENDECTOMY  3/2003   • FOOT ORIF  1988    left   • TONSILLECTOMY  1981       Fam Hx:    family history includes Arthritis in her father; Cancer in her father, paternal aunt, and sister; Diabetes in her unknown relative; Hypertension in her father; Prostate cancer in her father.  Family Status   Relation Name Status   • Fa  Alive   • Sis  Alive   • Mo  Alive   • Sis  Alive   • PAunt  (Not Specified)   • Unknown  (Not Specified)   • Neg " Hx  (Not Specified)       Soc HX:  Social History     Socioeconomic History   • Marital status:      Spouse name: Not on file   • Number of children: Not on file   • Years of education: Not on file   • Highest education level: Not on file   Occupational History   • Not on file   Social Needs   • Financial resource strain: Not on file   • Food insecurity:     Worry: Not on file     Inability: Not on file   • Transportation needs:     Medical: Not on file     Non-medical: Not on file   Tobacco Use   • Smoking status: Never Smoker   • Smokeless tobacco: Never Used   Substance and Sexual Activity   • Alcohol use: Yes     Alcohol/week: 4.2 oz     Types: 7 Glasses of wine per week     Comment: once a day   • Drug use: No   • 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: , 2 children, teacher   Lifestyle   • Physical activity:     Days per week: Not on file     Minutes per session: Not on file   • Stress: Not on file   Relationships   • Social connections:     Talks on phone: Not on file     Gets together: Not on file     Attends Mandaen service: Not on file     Active member of club or organization: Not on file     Attends meetings of clubs or organizations: Not on file     Relationship status: Not on file   • Intimate partner violence:     Fear of current or ex partner: Not on file     Emotionally abused: Not on file     Physically abused: Not on file     Forced sexual activity: Not on file   Other Topics Concern   • Not on file   Social History Narrative   • Not on file         Medications:    Current Outpatient Medications:   •  polymixin-trimethoprim (POLYTRIM) 10410-2.1 UNIT/ML-% Solution, Place 1 Drop in both eyes every 4 hours., Disp: 10 mL, Rfl: 0  •  phentermine (ADIPEX-P) 37.5 MG tablet, Take 1 Tab by mouth every morning before breakfast for 30 days., Disp: 30 Tab, Rfl: 1  •  omeprazole (PRILOSEC) 20 MG delayed-release capsule, Take 1 Cap by mouth every day., Disp: 30 Cap, Rfl: 5  •  atorvastatin  "(LIPITOR) 20 MG Tab, Take 1 Tab by mouth every day., Disp: 30 Tab, Rfl: 5  •  tizanidine (ZANAFLEX) 4 MG Tab, Take 1 Tab by mouth at bedtime as needed (joint pain / muscle tightness)., Disp: 30 Tab, Rfl: 1  •  montelukast (SINGULAIR) 10 MG Tab, Take 1 Tab by mouth every day., Disp: 30 Tab, Rfl: 11  •  valacyclovir (VALTREX) 1 GM Tab, Take 1 Tab by mouth 2 times a day., Disp: 60 Tab, Rfl: 5  •  NON SPECIFIED, ResMed AirMini with P10 Setup Pack and Mask   Add Travel Case, Disp: 1 Each, Rfl: 0  •  NON SPECIFIED, Plantar fascia splint, Disp: 1 Each, Rfl: 1  •  epinephrine (EPIPEN) 0.3 MG/0.3ML (1:1000) injection, 0.3 mL by Intramuscular route Once PRN for 1 dose., Disp: 1 Each, Rfl: 3      Allergies:  Asa [aspirin]; Nsaids; Sulfa drugs; Augmentin; and Contrast media with iodine [iodine]    ROS  Constitutional: Negative for fever, chills and malaise/fatigue.   HENT: Negative for congestion and sore throat.    Eyes: Positive for blurred vision, eye fullness/itchy sensation, yellow matting/discharge and redness. Negative for double vision and photophobia.   Respiratory: Negative for cough and shortness of breath.    Cardiovascular: Negative for chest pain and palpitations.   Gastrointestinal: Negative for nausea, vomiting, abdominal pain, diarrhea and constipation.   Genitourinary: Negative for dysuria and flank pain.   Musculoskeletal: Negative for joint pain and myalgias.   Skin: Negative for itching and rash.   Neurological: Negative for dizziness, tingling, weakness and headaches.   Endo/Heme/Allergies: Does not bruise/bleed easily.   Psychiatric/Behavioral: Negative for depression. The patient is not nervous/anxious.    All other systems reviewed and are negative.         Objective:     /70   Pulse 72   Temp 36.7 °C (98 °F)   Resp 18   Ht 1.702 m (5' 7\")   Wt 82.6 kg (182 lb)   SpO2 96%   BMI 28.51 kg/m²      Physical Exam      Constitutional: PT is oriented to person, place, and time. PT appears " well-developed and well-nourished. No distress.   HENT:   Head: Normocephalic and atraumatic.   Nose: Nose normal.   Mouth/Throat: Oropharynx is clear and moist. No oropharyngeal exudate.   Eyes: EOM are normal. Conjunctiva on left and right injected. Pupils are equal, round, and reactive to light. Right eye exhibits discharge. Left eye exhibits discharge. No scleral icterus.   Neck: Normal range of motion. Neck supple. No thyromegaly present.   Cardiovascular: Normal rate, regular rhythm, normal heart sounds and intact distal pulses.  Exam reveals no gallop and no friction rub.    No murmur heard.  Pulmonary/Chest: Breath sounds normal. No respiratory distress. PT has no wheezes. PT has no rales. PT exhibits no tenderness.   Abdominal: Soft. Bowel sounds are normal. PT exhibits no distension and no mass. There is no tenderness. There is no rebound and no guarding.   Musculoskeletal: Normal range of motion. PT exhibits no edema and no tenderness.   Neurological: PT is alert and oriented to person, place, and time. No cranial nerve deficit.   Skin: Skin is warm and dry. No rash noted. No erythema.   Psychiatric: PT has a normal mood and affect. PT behavior is normal. Judgment and thought content normal.          Assessment/Plan:     1. Acute bacterial conjunctivitis of both eyes    - polymixin-trimethoprim (POLYTRIM) 30095-9.1 UNIT/ML-% Solution; Place 1 Drop in both eyes every 4 hours.  Dispense: 10 mL; Refill: 0      Rest, fluids encouraged.  AVS with medical info given.  Pt was in full understanding and agreement with the plan.  Differential diagnosis, natural history, supportive care, and indications for immediate follow-up discussed. All questions answered. Patient agrees with the plan of care.  Follow-up as needed if symptoms worsen or fail to improve.

## 2019-09-20 NOTE — TELEPHONE ENCOUNTER
Was the patient seen in the last year in this department? Yes  7/29/19  Does patient have an active prescription for medications requested? No     Received Request Via: Pharmacy

## 2019-09-22 RX ORDER — ATORVASTATIN CALCIUM 20 MG/1
20 TABLET, FILM COATED ORAL DAILY
Qty: 30 TAB | Refills: 5 | Status: SHIPPED | OUTPATIENT
Start: 2019-09-22 | End: 2020-04-07 | Stop reason: SDUPTHER

## 2019-09-27 DIAGNOSIS — E66.3 OVERWEIGHT: ICD-10-CM

## 2019-09-27 NOTE — TELEPHONE ENCOUNTER
Was the patient seen in the last year in this department? Yes   last fill 8/20/19 #30  Does patient have an active prescription for medications requested? No     Received Request Via: Patient

## 2019-09-30 RX ORDER — PHENTERMINE HYDROCHLORIDE 37.5 MG/1
37.5 TABLET ORAL
Qty: 30 TAB | Refills: 1 | Status: SHIPPED
Start: 2019-09-30 | End: 2019-10-24 | Stop reason: SDUPTHER

## 2019-10-24 ENCOUNTER — OFFICE VISIT (OUTPATIENT)
Dept: MEDICAL GROUP | Facility: LAB | Age: 49
End: 2019-10-24
Payer: COMMERCIAL

## 2019-10-24 VITALS
OXYGEN SATURATION: 99 % | RESPIRATION RATE: 12 BRPM | BODY MASS INDEX: 28.41 KG/M2 | HEIGHT: 67 IN | HEART RATE: 56 BPM | WEIGHT: 181 LBS | SYSTOLIC BLOOD PRESSURE: 115 MMHG | TEMPERATURE: 98.3 F | DIASTOLIC BLOOD PRESSURE: 60 MMHG

## 2019-10-24 DIAGNOSIS — Z23 NEED FOR VACCINATION: ICD-10-CM

## 2019-10-24 DIAGNOSIS — E66.3 OVERWEIGHT: ICD-10-CM

## 2019-10-24 DIAGNOSIS — B00.9 HSV-2 INFECTION: ICD-10-CM

## 2019-10-24 DIAGNOSIS — R73.01 IMPAIRED FASTING GLUCOSE: ICD-10-CM

## 2019-10-24 DIAGNOSIS — K31.7 GASTRIC POLYPS: ICD-10-CM

## 2019-10-24 LAB
HBA1C MFR BLD: 5.2 % (ref 0–5.6)
INT CON NEG: NEGATIVE
INT CON POS: POSITIVE

## 2019-10-24 PROCEDURE — 90686 IIV4 VACC NO PRSV 0.5 ML IM: CPT | Performed by: NURSE PRACTITIONER

## 2019-10-24 PROCEDURE — 83036 HEMOGLOBIN GLYCOSYLATED A1C: CPT | Performed by: NURSE PRACTITIONER

## 2019-10-24 PROCEDURE — 99214 OFFICE O/P EST MOD 30 MIN: CPT | Mod: 25 | Performed by: NURSE PRACTITIONER

## 2019-10-24 PROCEDURE — 90471 IMMUNIZATION ADMIN: CPT | Performed by: NURSE PRACTITIONER

## 2019-10-24 RX ORDER — VALACYCLOVIR HYDROCHLORIDE 1 G/1
1000 TABLET, FILM COATED ORAL 2 TIMES DAILY
Qty: 60 TAB | Refills: 5 | Status: SHIPPED | OUTPATIENT
Start: 2019-10-24 | End: 2020-04-07 | Stop reason: SDUPTHER

## 2019-10-24 RX ORDER — PHENTERMINE HYDROCHLORIDE 37.5 MG/1
37.5 TABLET ORAL
Qty: 30 TAB | Refills: 1 | Status: SHIPPED
Start: 2019-10-24 | End: 2019-12-31 | Stop reason: SDUPTHER

## 2019-10-25 NOTE — ASSESSMENT & PLAN NOTE
This is a chronic issue.  She has been struggling with outbreaks lately.  She prefers to stay on valacyclovir daily to prevent further outbreaks.  She is currently sexually active with one partner and has been for over a year.

## 2019-10-25 NOTE — ASSESSMENT & PLAN NOTE
This is a chronic issue for the patient.  She has been working hard on eliminating carbohydrates.  Mainly eats 2 eggs for breakfast, vegetables and protein for lunch/dinner.  She struggles a bit to stay away from French fries once per week.  Exercising regularly.  Due for an A1c today, last was 5.8 a few months ago.

## 2019-10-25 NOTE — PROGRESS NOTES
"Chief Complaint   Patient presents with   • Other     weight management       HPI  Krissy is a 49-year-old established female here to follow-up on a couple of things but overall feeling well.    BMI 28.0-28.9,adult  This is a chronic issue.  Being overweight is a chronic struggle for the patient.  Her goal is to get down to 160 pounds.  She is exercising diligently, riding a bicycle for up to 60 miles and lifting weights a couple of times per week.  She is eating healthy, has cut out most carbohydrates.  She is doing well on phentermine, denies increased anxiety, heart palpitations, panic attacks or insomnia.    HSV-2 infection  This is a chronic issue.  She has been struggling with outbreaks lately.  She prefers to stay on valacyclovir daily to prevent further outbreaks.  She is currently sexually active with one partner and has been for over a year.    Impaired fasting glucose  This is a chronic issue for the patient.  She has been working hard on eliminating carbohydrates.  Mainly eats 2 eggs for breakfast, vegetables and protein for lunch/dinner.  She struggles a bit to stay away from French fries once per week.  Exercising regularly.  Due for an A1c today, last was 5.8 a few months ago.    Gastric polyps:  Newly diagnosed issue on endoscopy last month.  Went for heartburn.  Was told to limit her use of PPI therapy and is now taking mostly Tums, using PPI if needed for breakthrough heartburn.    Past medical, surgical, family, and social history is reviewed and updated in Epic chart by me today.   Medications and allergies reviewed and updated in Epic chart by me today.     ROS:   As documented in history of present illness above    Exam:  /60 (BP Location: Left arm, Patient Position: Sitting, BP Cuff Size: Large adult)   Pulse (!) 56   Temp 36.8 °C (98.3 °F)   Resp 12   Ht 1.702 m (5' 7\")   Wt 82.1 kg (181 lb)   SpO2 99%   Constitutional: Alert, no distress, plus 3 vital signs  Skin:  Warm, dry, no " rashes invisible areas  Eye: Equal, round and reactive, conjunctiva clear  ENMT: Lips without lesions, good dentition, oropharynx clear    Neck: Trachea midline, no masses, no thyromegaly  Respiratory: Unlabored respiration, lungs clear to auscultation, no wheezes, no rhonchi  Cardiovascular: Normal rate and rhythm.  Psych: Alert, pleasant, well-groomed, normal affect    Assessment / Plan / Medical Decision makin. HSV-2 infection  -Stable with daily use of valacyclovir, usually taking about 1/day, increasing to 2 daily if needed.  - valacyclovir (VALTREX) 1 GM Tab; Take 1 Tab by mouth 2 times a day.  Dispense: 60 Tab; Refill: 5    2. Overweight  -She would like to continue on phentermine, stating it controls her appetite.  Denies insomnia, heart palpitations, heart racing sensation or increased anxiety with phentermine.  Follow-up 3 months.  - phentermine (ADIPEX-P) 37.5 MG tablet; Take 1 Tab by mouth every morning before breakfast for 30 days.  Dispense: 30 Tab; Refill: 1    3. BMI 28.0-28.9,adult  -Improving.  She has lost 6 pounds since her visit 3 months ago.    4. Gastric polyps  -Followed closely by gastroenterology.  She will be returning in December to have polyps removed.    5. Impaired fasting glucose  -Dramatically improved at 5.2!  She will continue on her current diet plan and exercise.  Repeat A1c 1-2 times yearly unless she veers from her current course.  - POCT Hemoglobin A1C    6. Need for vaccination  - Influenza Vaccine Quad Injection (PF)

## 2019-10-25 NOTE — ASSESSMENT & PLAN NOTE
This is a chronic issue.  Being overweight is a chronic struggle for the patient.  Her goal is to get down to 160 pounds.  She is exercising diligently, riding a bicycle for up to 60 miles and lifting weights a couple of times per week.  She is eating healthy, has cut out most carbohydrates.  She is doing well on phentermine, denies increased anxiety, heart palpitations, panic attacks or insomnia.

## 2019-11-20 ENCOUNTER — PATIENT MESSAGE (OUTPATIENT)
Dept: MEDICAL GROUP | Facility: LAB | Age: 49
End: 2019-11-20

## 2019-11-20 DIAGNOSIS — E66.3 OVERWEIGHT: ICD-10-CM

## 2019-11-26 ENCOUNTER — PATIENT MESSAGE (OUTPATIENT)
Dept: MEDICAL GROUP | Facility: LAB | Age: 49
End: 2019-11-26

## 2019-11-26 NOTE — TELEPHONE ENCOUNTER
From: Fartun Saba Daxa  To: KIMBERLY Plascencia  Sent: 11/26/2019 12:31 PM PST  Subject: Non-Urgent Medical Question    I understand.  Ashley is 17 and is currently seeing a therapist. This was our first step.  ----- Message -----  From: KIMBERLY Plascencia  Sent: 11/26/2019 11:58 AM PST  To: Fartun Saba Daxa  Subject: RE: Non-Urgent Medical Question  Eveline Sepulveda is out of the office currently. How old is your daughter? What has she tried?    DIA Gonzalez-C  Family Medicine     Covering for JUDD Merritt       ----- Message -----   From: Fartun Saba Daxa   Sent: 11/26/2019 11:15 AM PST   To: JUDD Merritt  Subject: Non-Urgent Medical Question    Ernie Mosquera,  This message is for my daughter, Ashley Mittal. We have an appointment with you on December 12th. She has promised to be completely honest with you this time. In the past she has self harmed by marking her legs with cuts, and has difficulty sleeping. She has also had depression. Sleeping has always been an issue since about the age of 8. Last night she couldn't fall asleep until 4:00am and woke up at 6:00am. Is there something we should do regarding her sleep at this time until December 12th?  Thank you for your help,  Krissy

## 2019-12-06 RX ORDER — PHENTERMINE HYDROCHLORIDE 15 MG/1
15 CAPSULE ORAL EVERY MORNING
COMMUNITY
End: 2020-11-16 | Stop reason: SDUPTHER

## 2019-12-06 NOTE — OR NURSING
Following at home generally in the 115-125/70 range.  Weight is up a bit.  He has noted heart rate is at 98, no chest pain or palpitations.     Pre admit apt: Pt. Instructed to continue regularly prescribed medications through day before surgery.  Instructed to take the following medications, the day of surgery, with a sip of water per anesthesia protocol: omeprazole    Pt's last dose of phentermine was 12/3

## 2019-12-10 ENCOUNTER — ANESTHESIA EVENT (OUTPATIENT)
Dept: SURGERY | Facility: MEDICAL CENTER | Age: 49
End: 2019-12-10
Payer: COMMERCIAL

## 2019-12-10 ENCOUNTER — ANESTHESIA (OUTPATIENT)
Dept: SURGERY | Facility: MEDICAL CENTER | Age: 49
End: 2019-12-10
Payer: COMMERCIAL

## 2019-12-10 ENCOUNTER — HOSPITAL ENCOUNTER (OUTPATIENT)
Facility: MEDICAL CENTER | Age: 49
End: 2019-12-10
Attending: INTERNAL MEDICINE | Admitting: INTERNAL MEDICINE
Payer: COMMERCIAL

## 2019-12-10 VITALS
BODY MASS INDEX: 29.24 KG/M2 | HEIGHT: 67 IN | OXYGEN SATURATION: 96 % | WEIGHT: 186.29 LBS | DIASTOLIC BLOOD PRESSURE: 73 MMHG | TEMPERATURE: 97.3 F | HEART RATE: 60 BPM | SYSTOLIC BLOOD PRESSURE: 111 MMHG | RESPIRATION RATE: 18 BRPM

## 2019-12-10 LAB — PATHOLOGY CONSULT NOTE: NORMAL

## 2019-12-10 PROCEDURE — 160046 HCHG PACU - 1ST 60 MINS PHASE II: Performed by: INTERNAL MEDICINE

## 2019-12-10 PROCEDURE — 502240 HCHG MISC OR SUPPLY RC 0272: Performed by: INTERNAL MEDICINE

## 2019-12-10 PROCEDURE — 160002 HCHG RECOVERY MINUTES (STAT): Performed by: INTERNAL MEDICINE

## 2019-12-10 PROCEDURE — 700105 HCHG RX REV CODE 258: Performed by: INTERNAL MEDICINE

## 2019-12-10 PROCEDURE — 160208 HCHG ENDO MINUTES - EA ADDL 1 MIN LEVEL 4: Performed by: INTERNAL MEDICINE

## 2019-12-10 PROCEDURE — 160203 HCHG ENDO MINUTES - 1ST 30 MINS LEVEL 4: Performed by: INTERNAL MEDICINE

## 2019-12-10 PROCEDURE — 700101 HCHG RX REV CODE 250: Performed by: ANESTHESIOLOGY

## 2019-12-10 PROCEDURE — 160036 HCHG PACU - EA ADDL 30 MINS PHASE I: Performed by: INTERNAL MEDICINE

## 2019-12-10 PROCEDURE — 160035 HCHG PACU - 1ST 60 MINS PHASE I: Performed by: INTERNAL MEDICINE

## 2019-12-10 PROCEDURE — 700111 HCHG RX REV CODE 636 W/ 250 OVERRIDE (IP): Performed by: ANESTHESIOLOGY

## 2019-12-10 PROCEDURE — 500066 HCHG BITE BLOCK, ECT: Performed by: INTERNAL MEDICINE

## 2019-12-10 PROCEDURE — 88305 TISSUE EXAM BY PATHOLOGIST: CPT

## 2019-12-10 PROCEDURE — 160025 RECOVERY II MINUTES (STATS): Performed by: INTERNAL MEDICINE

## 2019-12-10 PROCEDURE — 160009 HCHG ANES TIME/MIN: Performed by: INTERNAL MEDICINE

## 2019-12-10 PROCEDURE — 160048 HCHG OR STATISTICAL LEVEL 1-5: Performed by: INTERNAL MEDICINE

## 2019-12-10 RX ORDER — OXYCODONE HCL 5 MG/5 ML
5 SOLUTION, ORAL ORAL
Status: DISCONTINUED | OUTPATIENT
Start: 2019-12-10 | End: 2019-12-10 | Stop reason: HOSPADM

## 2019-12-10 RX ORDER — OXYCODONE HCL 5 MG/5 ML
10 SOLUTION, ORAL ORAL
Status: DISCONTINUED | OUTPATIENT
Start: 2019-12-10 | End: 2019-12-10 | Stop reason: HOSPADM

## 2019-12-10 RX ORDER — MEPERIDINE HYDROCHLORIDE 25 MG/ML
12.5 INJECTION INTRAMUSCULAR; INTRAVENOUS; SUBCUTANEOUS
Status: DISCONTINUED | OUTPATIENT
Start: 2019-12-10 | End: 2019-12-10 | Stop reason: HOSPADM

## 2019-12-10 RX ORDER — KETAMINE HYDROCHLORIDE 50 MG/ML
INJECTION, SOLUTION INTRAMUSCULAR; INTRAVENOUS PRN
Status: DISCONTINUED | OUTPATIENT
Start: 2019-12-10 | End: 2019-12-10 | Stop reason: SURG

## 2019-12-10 RX ORDER — HYDRALAZINE HYDROCHLORIDE 20 MG/ML
5 INJECTION INTRAMUSCULAR; INTRAVENOUS
Status: DISCONTINUED | OUTPATIENT
Start: 2019-12-10 | End: 2019-12-10 | Stop reason: HOSPADM

## 2019-12-10 RX ORDER — HYDROMORPHONE HYDROCHLORIDE 1 MG/ML
0.1 INJECTION, SOLUTION INTRAMUSCULAR; INTRAVENOUS; SUBCUTANEOUS
Status: DISCONTINUED | OUTPATIENT
Start: 2019-12-10 | End: 2019-12-10 | Stop reason: HOSPADM

## 2019-12-10 RX ORDER — HYDROMORPHONE HYDROCHLORIDE 1 MG/ML
0.2 INJECTION, SOLUTION INTRAMUSCULAR; INTRAVENOUS; SUBCUTANEOUS
Status: DISCONTINUED | OUTPATIENT
Start: 2019-12-10 | End: 2019-12-10 | Stop reason: HOSPADM

## 2019-12-10 RX ORDER — METOPROLOL TARTRATE 1 MG/ML
1 INJECTION, SOLUTION INTRAVENOUS
Status: DISCONTINUED | OUTPATIENT
Start: 2019-12-10 | End: 2019-12-10 | Stop reason: HOSPADM

## 2019-12-10 RX ORDER — HALOPERIDOL 5 MG/ML
1 INJECTION INTRAMUSCULAR
Status: DISCONTINUED | OUTPATIENT
Start: 2019-12-10 | End: 2019-12-10 | Stop reason: HOSPADM

## 2019-12-10 RX ORDER — MIDAZOLAM HYDROCHLORIDE 1 MG/ML
1 INJECTION INTRAMUSCULAR; INTRAVENOUS
Status: DISCONTINUED | OUTPATIENT
Start: 2019-12-10 | End: 2019-12-10 | Stop reason: HOSPADM

## 2019-12-10 RX ORDER — ONDANSETRON 2 MG/ML
4 INJECTION INTRAMUSCULAR; INTRAVENOUS
Status: DISCONTINUED | OUTPATIENT
Start: 2019-12-10 | End: 2019-12-10 | Stop reason: HOSPADM

## 2019-12-10 RX ORDER — HYDROMORPHONE HYDROCHLORIDE 1 MG/ML
0.4 INJECTION, SOLUTION INTRAMUSCULAR; INTRAVENOUS; SUBCUTANEOUS
Status: DISCONTINUED | OUTPATIENT
Start: 2019-12-10 | End: 2019-12-10 | Stop reason: HOSPADM

## 2019-12-10 RX ORDER — SODIUM CHLORIDE, SODIUM LACTATE, POTASSIUM CHLORIDE, CALCIUM CHLORIDE 600; 310; 30; 20 MG/100ML; MG/100ML; MG/100ML; MG/100ML
INJECTION, SOLUTION INTRAVENOUS CONTINUOUS
Status: DISCONTINUED | OUTPATIENT
Start: 2019-12-10 | End: 2019-12-10 | Stop reason: HOSPADM

## 2019-12-10 RX ORDER — DIPHENHYDRAMINE HYDROCHLORIDE 50 MG/ML
12.5 INJECTION INTRAMUSCULAR; INTRAVENOUS
Status: DISCONTINUED | OUTPATIENT
Start: 2019-12-10 | End: 2019-12-10 | Stop reason: HOSPADM

## 2019-12-10 RX ADMIN — PROPOFOL 200 MCG/KG/MIN: 10 INJECTION, EMULSION INTRAVENOUS at 07:59

## 2019-12-10 RX ADMIN — KETAMINE HYDROCHLORIDE 40 MG: 50 INJECTION, SOLUTION, CONCENTRATE INTRAMUSCULAR; INTRAVENOUS at 07:59

## 2019-12-10 RX ADMIN — SODIUM CHLORIDE, POTASSIUM CHLORIDE, SODIUM LACTATE AND CALCIUM CHLORIDE: 600; 310; 30; 20 INJECTION, SOLUTION INTRAVENOUS at 07:45

## 2019-12-10 RX ADMIN — PROPOFOL 50 MG: 10 INJECTION, EMULSION INTRAVENOUS at 07:59

## 2019-12-10 RX ADMIN — FENTANYL CITRATE 100 MCG: 50 INJECTION, SOLUTION INTRAMUSCULAR; INTRAVENOUS at 07:59

## 2019-12-10 RX ADMIN — LIDOCAINE HYDROCHLORIDE 40 MG: 20 INJECTION, SOLUTION INFILTRATION; PERINEURAL at 07:59

## 2019-12-10 RX ADMIN — SODIUM CHLORIDE, POTASSIUM CHLORIDE, SODIUM LACTATE AND CALCIUM CHLORIDE: 600; 310; 30; 20 INJECTION, SOLUTION INTRAVENOUS at 05:49

## 2019-12-10 RX ADMIN — GLYCOPYRROLATE 0.2 MG: 0.2 INJECTION INTRAMUSCULAR; INTRAVENOUS at 07:59

## 2019-12-10 RX ADMIN — MIDAZOLAM HYDROCHLORIDE 2 MG: 1 INJECTION, SOLUTION INTRAMUSCULAR; INTRAVENOUS at 07:59

## 2019-12-10 ASSESSMENT — PAIN SCALES - GENERAL: PAIN_LEVEL: 1

## 2019-12-10 NOTE — DISCHARGE INSTRUCTIONS
ENDOSCOPY HOME CARE INSTRUCTIONS    GASTROSCOPY OR ERCP  1. Don't eat or drink anything for about an hour after the test. You can then resume your regular diet.  2. Don't drive or drink alcohol for 24 hours. The medication you received will make you too drowsy.  3. Don't take any coffee, tea, or aspirin products until after you see your doctor. These can harm the lining of your stomach.  4. If you begin to vomit bloody material, or develop black or bloody stools, call your doctor as soon as possible.  5. If you have any neck, chest, abdominal pain or temp of 100 degrees, call your doctor.         Recommendations  1/ follow up with [facility] for further evaluation and recommendations  2/ continue Omeprazole 40 mg for the next 4 wks daily then as needed  3/ incorporate lifestyle changes in the hopes of avoiding further need of antireflux medication  4/ follow up with primary care physician for further health care needs.       You should call 911 if you develop problems with breathing or chest pain.  If any questions arise, call your doctor. If your doctor is not available, please feel free to call [phone]. You can also call the HEALTH HOTLINE open 24 hours/day, 7 days/week and speak to a nurse at [phone], or toll free [phone].    Depression / Suicide Risk    As you are discharged from this [facility] facility, it is important to learn how to keep safe from harming yourself.    Recognize the warning signs:  · Abrupt changes in personality, positive or negative- including increase in energy   · Giving away possessions  · Change in eating patterns- significant weight changes-  positive or negative  · Change in sleeping patterns- unable to sleep or sleeping all the time   · Unwillingness or inability to communicate  · Depression  · Unusual sadness, discouragement and loneliness  · Talk of wanting to die  · Neglect of personal appearance   · Rebelliousness- reckless behavior  · Withdrawal from  people/activities they love  · Confusion- inability to concentrate     If you or a loved one observes any of these behaviors or has concerns about self-harm, here's what you can do:  · Talk about it- your feelings and reasons for harming yourself  · Remove any means that you might use to hurt yourself (examples: pills, rope, extension cords, firearm)  · Get professional help from the community (Mental Health, Substance Abuse, psychological counseling)  · Do not be alone:Call your Safe Contact- someone whom you trust who will be there for you.  · Call your local CRISIS HOTLINE 835-5817 or 526-641-9092  · Call your local Children's Mobile Crisis Response Team [place] (070) 907-9925 or www.U*tique  · Call the toll free National Suicide Prevention Hotlines   · National Suicide Prevention Lifeline 374-181-BRJX (7776)  · National Hope Line Network 800-SUICIDE (394-3786)    I acknowledge receipt and understanding of these Home Care Instructions.

## 2019-12-10 NOTE — ANESTHESIA POSTPROCEDURE EVALUATION
Patient: Fartun Mittal    Procedure Summary     Date:  12/10/19 Room / Location:   ENDOSCOPIC ULTRASOUND ROOM / SURGERY [facility]    Anesthesia Start:  0754 Anesthesia Stop:  0833    Procedure:  GASTROSCOPY-POSSIBLE BIOPSY, DILATION, POLYPECTOMY, CONTROL OF HEMORRHAGE Diagnosis:       Gastric polyp      (gastric polyp)    Surgeon:  Pb Abdi M.D. Responsible Provider:  Salvador Nolasco M.D.    Anesthesia Type:  general ASA Status:  2          Final Anesthesia Type: general  Last vitals  BP   Blood Pressure: 140/91    Temp   36.2 °C (97.2 °F)    Pulse   Pulse: 65   Resp   16    SpO2   98 %      Anesthesia Post Evaluation    Patient location during evaluation: PACU  Patient participation: complete - patient participated  Level of consciousness: awake and alert  Pain score: 1    Airway patency: patent  Anesthetic complications: no  Cardiovascular status: hemodynamically stable  Respiratory status: acceptable  Hydration status: euvolemic    PONV: none

## 2019-12-10 NOTE — ANESTHESIA TIME REPORT
Anesthesia Start and Stop Event Times     Date Time Event    12/10/2019 0728 Ready for Procedure        Responsible Staff    No responsible staff documented.       Preop Diagnosis (Free Text):  Pre-op Diagnosis     HEARTBURN/COLON CANCER SCREENING        Preop Diagnosis (Codes):    Post op Diagnosis  Gastric polyps      Premium Reason  Non-Premium    Comments:

## 2019-12-10 NOTE — OR SURGEON
Immediate Post OP Note    PreOp Diagnosis: anemia    PostOp Diagnosis:    1/normal esophagus, GEJ at 38 cm normal Z line   2/ innumerable gastric polyps varying in size 4-15 mm in size, biopsied and then removed using hot cautery   3/ other wise normal EGD to the third portion of the duodenum    Procedure(s):  GASTROSCOPY-POSSIBLE BIOPSY, DILATION, POLYPECTOMY, CONTROL OF HEMORRHAGE - Wound Class: Clean Contaminated    Surgeon(s):  Pb Abdi M.D.    Anesthesiologist/Type of Anesthesia:  Anesthesiologist: Salvador Nolasco M.D./* No anesthesia type entered *    Surgical Staff:  Circulator: Josh Burden R.N.  Endoscopy Technician: Chelle Vang    Specimens removed if any:  ID Type Source Tests Collected by Time Destination   A : polyps bx Tissue Gastric PATHOLOGY SPECIMEN Pb Abdi M.D. 12/10/2019 0805        Estimated Blood Loss: 3 cc    Findings:     Prior to the procedure the patient was informed the risks and benefits of the procedure and freely signed the consent form.  She was monitored under standard monitoring blood pressure oxygen heart monitor no appreciable abnormalities noted during the procedure.  Using the standard Olympus gastroscope was introduced under direct visualization through the oropharynx which appeared normal.  Intubation esophagus achieved easily the entire length the esophagus appeared normal.  GE junction appreciate 38 cm normal Z line.  Intubation the gastric cavity allowed good insufflation for a panoramic view of the entire gastric mucosa which demonstrated innumerable gastric polyps varying anywhere from 4 mm to 15 mm in size.  The antrum and pylorus appeared normal.  Intubation the pylorus and appreciation of duodenal bulb second third portion of duodenum all the structures appeared normal.  Extubation the pylorus allowed retroflexion performed in the antrum to about the incisura body fundus and cardia and again only the innumerable gastric polyps were appreciated.   Some had signs of characteristics of hemorrhagic involvement.  Biopsies of the polyps were taken.  This was then followed by polypectomy of a large majority of the gastric polyps using hot cautery.  None of the polyps were retrieved.  Once this was performed the gastroscope was straightened excess air was removed and the patient tolerated the procedure well.  No other mucosal abnormalities were noted.    Complications: none    Recommendations  1/ follow up with [facility] for further evaluation and recommendations  2/ continue Omeprazole 40 mg po QD for the next 4 wks daily then as needed  3/ incorporate life style changes in the hopes of avoiding further need of antireflux medication.  4/ follow up with PCP for further health care needs.         12/10/2019 8:28 AM Pb Abdi M.D.

## 2019-12-10 NOTE — OR NURSING
0831 Sedated to recovery. Lungs clear, abdomen soft and round. 0845 Pt awake, reoriented to surroundings, MEDINA's/ 0930 Pt remain comfortable and continues to doze.0945 Pt meets criteria for discharge. 1000 Pr discharged at this time.

## 2019-12-31 ENCOUNTER — PATIENT MESSAGE (OUTPATIENT)
Dept: MEDICAL GROUP | Facility: LAB | Age: 49
End: 2019-12-31

## 2019-12-31 DIAGNOSIS — E66.3 OVERWEIGHT: ICD-10-CM

## 2019-12-31 RX ORDER — PHENTERMINE HYDROCHLORIDE 37.5 MG/1
37.5 TABLET ORAL
Qty: 30 TAB | Refills: 1 | Status: SHIPPED
Start: 2019-12-31 | End: 2020-02-25 | Stop reason: SDUPTHER

## 2019-12-31 NOTE — TELEPHONE ENCOUNTER
----- Message from Fartun Mittal sent at 12/31/2019  9:25 AM PST -----  Regarding: Non-Urgent Medical Question  Contact: [phone]  Happy New Year Eveline!  I can't believe it will  2020!  Can I get a refill of my diet pill? I didn't lose any weight during the Holidays, but I didn't gain either :)  ThanksKrissy

## 2019-12-31 NOTE — OP REPORT
PreOp Diagnosis: anemia     PostOp Diagnosis:               1/normal esophagus, GEJ at 38 cm normal Z line              2/ innumerable gastric polyps varying in size 4-15 mm in size, biopsied and then removed using hot cautery              3/ other wise normal EGD to the third portion of the duodenum     Procedure(s):  GASTROSCOPY-POSSIBLE BIOPSY, DILATION, POLYPECTOMY, CONTROL OF HEMORRHAGE - Wound Class: Clean Contaminated     Surgeon(s):  Pb Abdi M.D.     Anesthesiologist/Type of Anesthesia:  Anesthesiologist: Salvador Nolasco M.D./* No anesthesia type entered *     Surgical Staff:  Circulator: Josh Burden R.N.  Endoscopy Technician: Chelle Vang     Specimens removed if any:  ID Type Source Tests Collected by Time Destination   A : polyps bx Tissue Gastric PATHOLOGY SPECIMEN Pb Abdi M.D. 12/10/2019 0805           Estimated Blood Loss: 3 cc     Findings:      Prior to the procedure the patient was informed the risks and benefits of the procedure and freely signed the consent form.  She was monitored under standard monitoring blood pressure oxygen heart monitor no appreciable abnormalities noted during the procedure.  Using the standard Olympus gastroscope was introduced under direct visualization through the oropharynx which appeared normal.  Intubation esophagus achieved easily the entire length the esophagus appeared normal.  GE junction appreciate 38 cm normal Z line.  Intubation the gastric cavity allowed good insufflation for a panoramic view of the entire gastric mucosa which demonstrated innumerable gastric polyps varying anywhere from 4 mm to 15 mm in size.  The antrum and pylorus appeared normal.  Intubation the pylorus and appreciation of duodenal bulb second third portion of duodenum all the structures appeared normal.  Extubation the pylorus allowed retroflexion performed in the antrum to about the incisura body fundus and cardia and again only the innumerable gastric polyps  were appreciated.  Some had signs of characteristics of hemorrhagic involvement.  Biopsies of the polyps were taken.  This was then followed by polypectomy of a large majority of the gastric polyps using hot cautery.  None of the polyps were retrieved.  Once this was performed the gastroscope was straightened excess air was removed and the patient tolerated the procedure well.  No other mucosal abnormalities were noted.     Complications: none     Recommendations  1/ follow up with [facility] for further evaluation and recommendations  2/ continue Omeprazole 40 mg po QD for the next 4 wks daily then as needed  3/ incorporate life style changes in the hopes of avoiding further need of antireflux medication.  4/ follow up with PCP for further health care needs.            12/10/2019 8:28 AM Pb Abdi M.D.

## 2019-12-31 NOTE — PATIENT COMMUNICATION
Was the patient seen in the last year in this department? Yes   last Fill 11/26/19 #30  Does patient have an active prescription for medications requested? No     Received Request Via: Patient

## 2020-01-19 DIAGNOSIS — Z82.49 FAMILY HISTORY OF CARDIOMEGALY: ICD-10-CM

## 2020-01-19 DIAGNOSIS — R06.02 SOB (SHORTNESS OF BREATH): ICD-10-CM

## 2020-01-31 ENCOUNTER — APPOINTMENT (OUTPATIENT)
Dept: CARDIOLOGY | Facility: MEDICAL CENTER | Age: 50
End: 2020-01-31
Attending: NURSE PRACTITIONER
Payer: COMMERCIAL

## 2020-02-03 ENCOUNTER — TELEPHONE (OUTPATIENT)
Dept: MEDICAL GROUP | Facility: LAB | Age: 50
End: 2020-02-03

## 2020-02-03 DIAGNOSIS — E66.3 OVERWEIGHT: ICD-10-CM

## 2020-02-03 NOTE — TELEPHONE ENCOUNTER
----- Message from Fartun Mittal sent at 2/2/2020 10:51 AM PST -----  Regarding: Non-Urgent Medical Question  Contact: [phone]  Ernie Mosquera,  Can I get a refill of Phentermine?  Good news,  I have lost the holiday weight and back at 179.  I started at 196 and my goal is 165.  I started this Caroline that keeps track of my calories and is very motivating :)  ThanksKrissy

## 2020-02-03 NOTE — TELEPHONE ENCOUNTER
Received request via: Patient    Was the patient seen in the last year in this department? Yes  LOV 10/24/19  Does the patient have an active prescription (recently filled or refills available) for medication(s) requested? No

## 2020-02-25 DIAGNOSIS — E66.3 OVERWEIGHT: ICD-10-CM

## 2020-02-25 RX ORDER — PHENTERMINE HYDROCHLORIDE 37.5 MG/1
37.5 TABLET ORAL
Qty: 30 TAB | Refills: 1 | Status: SHIPPED
Start: 2020-02-25 | End: 2020-04-07 | Stop reason: SDUPTHER

## 2020-02-25 NOTE — TELEPHONE ENCOUNTER
Received request via: Pharmacy   2/4/2020  Was the patient seen in the last year in this department? Yes  10/24/19  Does the patient have an active prescription (recently filled or refills available) for medication(s) requested? No

## 2020-03-04 ENCOUNTER — NON-PROVIDER VISIT (OUTPATIENT)
Dept: MEDICAL GROUP | Facility: LAB | Age: 50
End: 2020-03-04
Payer: COMMERCIAL

## 2020-03-04 DIAGNOSIS — Z23 NEED FOR VACCINATION: ICD-10-CM

## 2020-03-04 NOTE — PROGRESS NOTES
"Fartun Mittal is a 50 y.o. female here for a non-provider visit for:   SHINGRIX (Shingles)    Reason for immunization: lack of immunity demonstrated on prior labs or testing  Immunization records indicate need for vaccine: Yes, confirmed with Epic  Minimum interval has been met for this vaccine: Yes  ABN completed: Yes    Order and dose verified by: AM  VIS Dated  10/30/20 was given to patient: Yes  All IAC Questionnaire questions were answered \"No.\"    Patient tolerated injection and no adverse effects were observed or reported: Yes    Pt scheduled for next dose in series: No  "

## 2020-03-25 PROCEDURE — 90750 HZV VACC RECOMBINANT IM: CPT | Performed by: NURSE PRACTITIONER

## 2020-03-25 PROCEDURE — 90471 IMMUNIZATION ADMIN: CPT | Performed by: NURSE PRACTITIONER

## 2020-04-07 ENCOUNTER — OFFICE VISIT (OUTPATIENT)
Dept: MEDICAL GROUP | Facility: LAB | Age: 50
End: 2020-04-07
Payer: COMMERCIAL

## 2020-04-07 VITALS
SYSTOLIC BLOOD PRESSURE: 128 MMHG | DIASTOLIC BLOOD PRESSURE: 72 MMHG | WEIGHT: 178 LBS | BODY MASS INDEX: 27.94 KG/M2 | TEMPERATURE: 98.6 F | HEIGHT: 67 IN

## 2020-04-07 DIAGNOSIS — K31.7 GASTRIC POLYPS: ICD-10-CM

## 2020-04-07 DIAGNOSIS — E78.49 OTHER HYPERLIPIDEMIA: ICD-10-CM

## 2020-04-07 DIAGNOSIS — Z00.00 PREVENTATIVE HEALTH CARE: ICD-10-CM

## 2020-04-07 DIAGNOSIS — E66.3 OVERWEIGHT: ICD-10-CM

## 2020-04-07 DIAGNOSIS — B00.9 HSV-2 INFECTION: ICD-10-CM

## 2020-04-07 DIAGNOSIS — J32.8 OTHER CHRONIC SINUSITIS: ICD-10-CM

## 2020-04-07 PROBLEM — J34.3 HYPERTROPHY OF NASAL TURBINATES: Status: ACTIVE | Noted: 2020-04-07

## 2020-04-07 PROBLEM — J32.4 CHRONIC PANSINUSITIS: Status: ACTIVE | Noted: 2020-04-07

## 2020-04-07 PROCEDURE — 99214 OFFICE O/P EST MOD 30 MIN: CPT | Mod: CR,95 | Performed by: NURSE PRACTITIONER

## 2020-04-07 RX ORDER — VALACYCLOVIR HYDROCHLORIDE 1 G/1
1000 TABLET, FILM COATED ORAL 2 TIMES DAILY
Qty: 180 TAB | Refills: 3 | Status: SHIPPED | OUTPATIENT
Start: 2020-04-07 | End: 2020-12-04 | Stop reason: SDUPTHER

## 2020-04-07 RX ORDER — PHENTERMINE HYDROCHLORIDE 37.5 MG/1
37.5 TABLET ORAL
Qty: 30 TAB | Refills: 2 | Status: SHIPPED
Start: 2020-04-07 | End: 2020-05-07

## 2020-04-07 RX ORDER — AZELASTINE 1 MG/ML
1 SPRAY, METERED NASAL 2 TIMES DAILY
Qty: 30 ML | Refills: 4 | Status: SHIPPED | DISCHARGE
Start: 2020-04-07 | End: 2021-07-20

## 2020-04-07 RX ORDER — ATORVASTATIN CALCIUM 20 MG/1
20 TABLET, FILM COATED ORAL DAILY
Qty: 90 TAB | Refills: 3 | Status: SHIPPED | OUTPATIENT
Start: 2020-04-07 | End: 2021-05-20 | Stop reason: SDUPTHER

## 2020-04-07 RX ORDER — FAMOTIDINE 20 MG/1
20 TABLET, FILM COATED ORAL 2 TIMES DAILY
Qty: 60 TAB | Refills: 6 | Status: SHIPPED | OUTPATIENT
Start: 2020-04-07 | End: 2021-12-21

## 2020-04-07 ASSESSMENT — FIBROSIS 4 INDEX: FIB4 SCORE: 0.79

## 2020-04-07 NOTE — ASSESSMENT & PLAN NOTE
Chronic issue and also followed by Dr. Butler.  Using flonase, astelin and singular - feels chronic sinus issues are well controlled.  Denies recent problems with recurrent sinusitis.

## 2020-04-07 NOTE — PROGRESS NOTES
"CC  F/u virtual visit b/c of coronavirus    HPI   This encounter was conducted via Zoom .   Verbal consent was obtained. Patient's identity was verified.    Krissy is a 50-year-old established female following up on several chronic issues but overall feeling pretty well.    Gastric polyps  Chronic issue.  Followed by GI and removed.  Told polyps were caused by omeprazole and was changed to pepcid - taking bid now.      Chronic sinusitis  Chronic issue and also followed by Dr. Butler.  Using flonase, astelin and singular - feels chronic sinus issues are well controlled.  Denies recent problems with recurrent sinusitis.    Overweight:  Chronic issue.  Doing well with phentermine.  Weight is currently down to 178 pounds and her goal is 165 pounds.  She is exercising 3-4 times per week on her elliptical, doing weights and bike biking.  Denies any negative side effects of phentermine, particularly denies heart palpitations, insomnia or increased anxiety.    Hyperlipidemia: Chronic issue.  Compliant with statin therapy.  Denies chronic myalgias with her statin.  She does not smoke.  She is never had a heart attack or stroke.    HSV-2: Chronic issue.  Prefers to take daily valacyclovir and increase this to twice daily if she has a hint of an outbreak.    Past medical, surgical, family, and social history is reviewed and updated in Epic chart by me today.   Medications and allergies reviewed and updated in Epic chart by me today.     ROS:   As documented in history of present illness above    Exam:  /72   Temp 37 °C (98.6 °F)   Ht 1.702 m (5' 7\")   Wt 80.7 kg (178 lb)   Gen. appears healthy in no distress   Skin appropriate for sex and age   Neck trachea is midline  Lungs unlabored breathing  Heart regular rate  Neuro gait and station normal   Psych appropriate, calm, interactive, well-groomed    Assessment / Plan / Medical Decision makin. Overweight  -Improving.  Continue same.  Discussed potential side effects " of phentermine.  Encouraged her to monitor her heart rate and blood pressure several times per week.  Follow-up 3 months.  - phentermine (ADIPEX-P) 37.5 MG tablet; Take 1 Tab by mouth every morning before breakfast for 30 days.  Dispense: 30 Tab; Refill: 2    2. Gastric polyps  Improved/resolved.  Followed by GI.  Refilled famotidine as she is having trouble finding it over-the-counter.  Off of omeprazole.  - famotidine (PEPCID) 20 MG Tab; Take 1 Tab by mouth 2 times a day.  Dispense: 60 Tab; Refill: 6    3. Other chronic sinusitis  -Stable.  Followed by ENT as well.  - azelastine (ASTELIN) 137 MCG/SPRAY nasal spray; Spray 1 Spray in nose 2 times a day.  Dispense: 30 mL; Refill: 4    4. HSV-2 infection  -Stable.  Refilled medication.  - valacyclovir (VALTREX) 1 GM Tab; Take 1 Tab by mouth 2 times a day.  Dispense: 180 Tab; Refill: 3    5. Other hyperlipidemia  -Stable.  Continue same.  Recheck labs in June.  - atorvastatin (LIPITOR) 20 MG Tab; Take 1 Tab by mouth every day.  Dispense: 90 Tab; Refill: 3    6. Preventative health care  - Comp Metabolic Panel; Future  - CBC WITH DIFFERENTIAL; Future  - Lipid Profile; Future  - HEMOGLOBIN A1C; Future  - TSH; Future

## 2020-04-07 NOTE — ASSESSMENT & PLAN NOTE
Chronic issue.  Followed by GI and removed.  Told polyps were caused by omeprazole and was changed to pepcid - taking bid now.

## 2020-06-08 ENCOUNTER — NON-PROVIDER VISIT (OUTPATIENT)
Dept: MEDICAL GROUP | Facility: LAB | Age: 50
End: 2020-06-08
Payer: COMMERCIAL

## 2020-06-08 DIAGNOSIS — Z91.09 ENVIRONMENTAL ALLERGIES: ICD-10-CM

## 2020-06-08 PROCEDURE — 96372 THER/PROPH/DIAG INJ SC/IM: CPT | Performed by: NURSE PRACTITIONER

## 2020-06-08 RX ORDER — TRIAMCINOLONE ACETONIDE 40 MG/ML
40 INJECTION, SUSPENSION INTRA-ARTICULAR; INTRAMUSCULAR ONCE
Status: COMPLETED | OUTPATIENT
Start: 2020-06-08 | End: 2020-06-08

## 2020-06-08 RX ADMIN — TRIAMCINOLONE ACETONIDE 40 MG: 40 INJECTION, SUSPENSION INTRA-ARTICULAR; INTRAMUSCULAR at 16:17

## 2020-06-08 NOTE — PROGRESS NOTES
Fartun Mittal is a 50 y.o. female here for a non-provider visit for KENALOG injection.    Reason for injection:   Order in MAR?: Yes  Patient supplied?:No  Minimum interval has been met for this injection (per MAR order): Yes    Order and dose verified by: CK  Patient tolerated injection and no adverse effects were observed or reported: Yes    # of Administrations remaining in MAR: 0

## 2020-06-11 RX ORDER — GABAPENTIN 300 MG/1
CAPSULE ORAL
Qty: 90 CAP | Refills: 1 | Status: SHIPPED
Start: 2020-06-11 | End: 2020-11-22

## 2020-06-26 ENCOUNTER — HOSPITAL ENCOUNTER (OUTPATIENT)
Dept: LAB | Facility: MEDICAL CENTER | Age: 50
End: 2020-06-26
Attending: NURSE PRACTITIONER
Payer: COMMERCIAL

## 2020-06-26 DIAGNOSIS — Z11.59 ENCOUNTER FOR SCREENING FOR OTHER VIRAL DISEASES: ICD-10-CM

## 2020-06-26 DIAGNOSIS — Z00.00 PREVENTATIVE HEALTH CARE: ICD-10-CM

## 2020-06-26 LAB
ALBUMIN SERPL BCP-MCNC: 4.6 G/DL (ref 3.2–4.9)
ALBUMIN/GLOB SERPL: 1.7 G/DL
ALP SERPL-CCNC: 71 U/L (ref 30–99)
ALT SERPL-CCNC: 46 U/L (ref 2–50)
ANION GAP SERPL CALC-SCNC: 10 MMOL/L (ref 7–16)
AST SERPL-CCNC: 35 U/L (ref 12–45)
BASOPHILS # BLD AUTO: 0.9 % (ref 0–1.8)
BASOPHILS # BLD: 0.07 K/UL (ref 0–0.12)
BILIRUB SERPL-MCNC: 0.6 MG/DL (ref 0.1–1.5)
BUN SERPL-MCNC: 14 MG/DL (ref 8–22)
CALCIUM SERPL-MCNC: 9.5 MG/DL (ref 8.5–10.5)
CHLORIDE SERPL-SCNC: 109 MMOL/L (ref 96–112)
CHOLEST SERPL-MCNC: 179 MG/DL (ref 100–199)
CO2 SERPL-SCNC: 21 MMOL/L (ref 20–33)
CREAT SERPL-MCNC: 0.69 MG/DL (ref 0.5–1.4)
EOSINOPHIL # BLD AUTO: 0.39 K/UL (ref 0–0.51)
EOSINOPHIL NFR BLD: 5.2 % (ref 0–6.9)
ERYTHROCYTE [DISTWIDTH] IN BLOOD BY AUTOMATED COUNT: 45.3 FL (ref 35.9–50)
EST. AVERAGE GLUCOSE BLD GHB EST-MCNC: 108 MG/DL
FASTING STATUS PATIENT QL REPORTED: NORMAL
GLOBULIN SER CALC-MCNC: 2.7 G/DL (ref 1.9–3.5)
GLUCOSE SERPL-MCNC: 103 MG/DL (ref 65–99)
HBA1C MFR BLD: 5.4 % (ref 0–5.6)
HCT VFR BLD AUTO: 45.1 % (ref 37–47)
HDLC SERPL-MCNC: 60 MG/DL
HGB BLD-MCNC: 15 G/DL (ref 12–16)
IMM GRANULOCYTES # BLD AUTO: 0.01 K/UL (ref 0–0.11)
IMM GRANULOCYTES NFR BLD AUTO: 0.1 % (ref 0–0.9)
LDLC SERPL CALC-MCNC: 104 MG/DL
LYMPHOCYTES # BLD AUTO: 2.55 K/UL (ref 1–4.8)
LYMPHOCYTES NFR BLD: 34 % (ref 22–41)
MCH RBC QN AUTO: 33.6 PG (ref 27–33)
MCHC RBC AUTO-ENTMCNC: 33.3 G/DL (ref 33.6–35)
MCV RBC AUTO: 101.1 FL (ref 81.4–97.8)
MONOCYTES # BLD AUTO: 0.66 K/UL (ref 0–0.85)
MONOCYTES NFR BLD AUTO: 8.8 % (ref 0–13.4)
NEUTROPHILS # BLD AUTO: 3.81 K/UL (ref 2–7.15)
NEUTROPHILS NFR BLD: 51 % (ref 44–72)
NRBC # BLD AUTO: 0 K/UL
NRBC BLD-RTO: 0 /100 WBC
PLATELET # BLD AUTO: 269 K/UL (ref 164–446)
PMV BLD AUTO: 10.5 FL (ref 9–12.9)
POTASSIUM SERPL-SCNC: 4.4 MMOL/L (ref 3.6–5.5)
PROT SERPL-MCNC: 7.3 G/DL (ref 6–8.2)
RBC # BLD AUTO: 4.46 M/UL (ref 4.2–5.4)
SODIUM SERPL-SCNC: 140 MMOL/L (ref 135–145)
TRIGL SERPL-MCNC: 75 MG/DL (ref 0–149)
WBC # BLD AUTO: 7.5 K/UL (ref 4.8–10.8)

## 2020-06-26 PROCEDURE — 85025 COMPLETE CBC W/AUTO DIFF WBC: CPT

## 2020-06-26 PROCEDURE — 36415 COLL VENOUS BLD VENIPUNCTURE: CPT

## 2020-06-26 PROCEDURE — 83036 HEMOGLOBIN GLYCOSYLATED A1C: CPT

## 2020-06-26 PROCEDURE — 80061 LIPID PANEL: CPT

## 2020-06-26 PROCEDURE — 84443 ASSAY THYROID STIM HORMONE: CPT

## 2020-06-26 PROCEDURE — 80053 COMPREHEN METABOLIC PANEL: CPT

## 2020-06-27 LAB — TSH SERPL DL<=0.005 MIU/L-ACNC: 1.86 UIU/ML (ref 0.38–5.33)

## 2020-06-29 ENCOUNTER — TELEPHONE (OUTPATIENT)
Dept: MEDICAL GROUP | Facility: LAB | Age: 50
End: 2020-06-29

## 2020-06-29 DIAGNOSIS — Z23 NEED FOR VACCINATION: ICD-10-CM

## 2020-06-29 NOTE — TELEPHONE ENCOUNTER
1. Caller Name: Fartun Rivera is on the MA Schedule tomorrow for Shingrix vaccine/injection.    SPECIFIC Action To Be Taken: Orders pending, please sign.

## 2020-06-30 ENCOUNTER — NON-PROVIDER VISIT (OUTPATIENT)
Dept: MEDICAL GROUP | Facility: LAB | Age: 50
End: 2020-06-30
Payer: COMMERCIAL

## 2020-06-30 DIAGNOSIS — Z23 NEED FOR VACCINATION: ICD-10-CM

## 2020-06-30 PROCEDURE — 90750 HZV VACC RECOMBINANT IM: CPT | Performed by: NURSE PRACTITIONER

## 2020-06-30 PROCEDURE — 90471 IMMUNIZATION ADMIN: CPT | Performed by: NURSE PRACTITIONER

## 2020-06-30 NOTE — PROGRESS NOTES
"Fartun Mittal is a 50 y.o. female here for a non-provider visit for:   SHINGRIX (Shingles)    Reason for immunization: continue or complete series started at the office  Immunization records indicate need for vaccine: Yes, confirmed with Epic  Minimum interval has been met for this vaccine: Yes  ABN completed: Not Indicated    Order and dose verified by: ms SANDHU Dated  2/12/2018 was given to patient: Yes  All IAC Questionnaire questions were answered \"No.\"    Patient tolerated injection and no adverse effects were observed or reported: Yes    Pt scheduled for next dose in series: No  "

## 2020-07-05 NOTE — ANESTHESIA PREPROCEDURE EVALUATION
Relevant Problems   ANESTHESIA   (+) CARI (obstructive sleep apnea)      PULMONARY   (+) Aspirin-sensitive asthma with nasal polyps      CARDIAC   (+) Ocular migraine      GI   (+) GERD (gastroesophageal reflux disease)       Physical Exam    Airway   Mallampati: II  TM distance: >3 FB  Neck ROM: full       Cardiovascular - normal exam  Rhythm: regular  Rate: normal  (-) murmur     Dental - normal exam         Pulmonary - normal exam  Breath sounds clear to auscultation     Abdominal    Neurological - normal exam                 Anesthesia Plan    ASA 2       Plan - general       Airway plan will be natural airway        Induction: intravenous    Postoperative Plan: Postoperative administration of opioids is intended.    Pertinent diagnostic labs and testing reviewed    Informed Consent:    Anesthetic plan and risks discussed with patient.    Use of blood products discussed with: patient whom consented to blood products.          Spiritual Care Assessment/Progress Note [address] 
 
 
NAME: Reggie Barbosa      MRN: [id number] AGE: 79 y.o. SEX: female Synagogue Affiliation: No preference Language: [place] 7/4/2020     Total Time (in minutes): 95  
 
Spiritual Assessment begun in [facility] EMERGENCY DEPT through conversation with: 
  
    []Patient        [x] Family    [] Friend(s) Reason for Consult: Crisis, Request by staff Spiritual beliefs: (Please include comment if needed) [x] Identifies with a alejandra tradition:   Druze  
   [] Supported by a alejandra community:        
   [] Claims no spiritual orientation:       
   [] Seeking spiritual identity:            
   [] Adheres to an individual form of spirituality:       
   [] Not able to assess:                   
 
    
Identified resources for coping:  
   [] Prayer                           
   [] Music                  [] Guided Imagery [x] Family/friends                 [] Pet visits [] Devotional reading                         [] Unknown 
   [] Other:                                       
 
 
Interventions offered during this visit: (See comments for more details) Patient Interventions: Initial/Spiritual assessment, patient floor, Crisis, Coordination with community clergy Family/Friend(s): Affirmation of alejandra, Affirmation of emotions/emotional suffering, Catharsis/review of pertinent events in supportive environment, End of life issues discussed, Iconic (affirming the presence of God/Higher Power), Normalization of emotional/spiritual concerns, Coordination with community clergy Plan of Care: 
 
 [x] Support spiritual and/or cultural needs  
 [] Support AMD and/or advance care planning process [x] Support grieving process 
 [] Coordinate Rites and/or Rituals [x] Coordination with community clergy [] No spiritual needs identified at this time [] Detailed Plan of Care below (See Comments)  [] Make referral to Music Therapy 
[] Make referral to Pet Therapy    
[] Make referral to Addiction services 
[] Make referral to [facility] 
[] Make referral to Spiritual Care Partner 
[] No future visits requested       
[] Follow up visits as needed Comments:   Responded to page to ER for elderly patient with head bleed. Patient's daughter, Kody Tian [facility]) was present. Other daughter, Zulma arrived a little later. Provided pastoral presence and supportive listening as Kody Tian shared her concerns about her mother. Her mother's hope was to return home to [place], [place]. Kody Tian expressed regret and frustration that events of the last few months has prevented that. Patient is Buddhism; Kody Tian expressed that anointing by [place] [id number]  would be meaningful to patient who is likely near end of life. Made phone calls to two local priests, but at the time of this note being written, I have not heard back from either one. Daughter, Kody Tian, expressed it would be fine if someone could come tomorrow. Will request  on duty tomorrow follow up. Aunsamson John made decision to go home and get some rest as patient will be admitted to Oncology. Family has expressed desire for Hospice care. NoeSPARKLE Cardozo, [facility], Staff  [facility]  Paging Service  [phone])

## 2020-07-20 DIAGNOSIS — M25.50 MULTIPLE JOINT PAIN: ICD-10-CM

## 2020-07-20 RX ORDER — TIZANIDINE 4 MG/1
4 TABLET ORAL NIGHTLY PRN
Qty: 30 TAB | Refills: 1 | Status: SHIPPED | OUTPATIENT
Start: 2020-07-20 | End: 2021-11-22 | Stop reason: SDUPTHER

## 2020-07-20 NOTE — TELEPHONE ENCOUNTER
Received request via: Pharmacy    Was the patient seen in the last year in this department? Yes4/7/20    Does the patient have an active prescription (recently filled or refills available) for medication(s) requested? No

## 2020-08-03 DIAGNOSIS — G47.33 OSA (OBSTRUCTIVE SLEEP APNEA): ICD-10-CM

## 2020-08-07 ENCOUNTER — APPOINTMENT (OUTPATIENT)
Dept: SLEEP MEDICINE | Facility: MEDICAL CENTER | Age: 50
End: 2020-08-07
Payer: COMMERCIAL

## 2020-08-07 ENCOUNTER — TELEPHONE (OUTPATIENT)
Dept: SLEEP MEDICINE | Facility: MEDICAL CENTER | Age: 50
End: 2020-08-07

## 2020-08-07 DIAGNOSIS — G47.33 OSA (OBSTRUCTIVE SLEEP APNEA): ICD-10-CM

## 2020-08-07 NOTE — TELEPHONE ENCOUNTER
Pts appt was cxl'd due to provider being out of the office.    We will r/s her [facility] for hopefully next week or week after, hwoever she needs the supply order signed.    I advised her we would send it along with her compliance report and see if they accept it, please sign.

## 2020-08-19 ENCOUNTER — TELEMEDICINE (OUTPATIENT)
Dept: SLEEP MEDICINE | Facility: MEDICAL CENTER | Age: 50
End: 2020-08-19
Payer: COMMERCIAL

## 2020-08-19 VITALS — BODY MASS INDEX: 27.47 KG/M2 | WEIGHT: 175 LBS | HEIGHT: 67 IN

## 2020-08-19 DIAGNOSIS — G47.31 CENTRAL SLEEP APNEA: ICD-10-CM

## 2020-08-19 PROCEDURE — 99212 OFFICE O/P EST SF 10 MIN: CPT | Mod: 95,CR | Performed by: INTERNAL MEDICINE

## 2020-08-19 ASSESSMENT — FIBROSIS 4 INDEX: FIB4 SCORE: 0.98

## 2020-08-19 NOTE — PROGRESS NOTES
Telemedicine Visit: Established Patient     This evaluation was conducted via [employer] using secure and encrypted videoconferencing technology.  The patient was physical located at her home and the physician was located in [facility]. The patient's identity was confirmed and verbal consent for the telemedicine encounter was obtained.      Subjective:   CC: annual central sleep apnea follow up    Fartun Mittal is a 50 y.o. female presenting for evaluation and management of sleep apnea. PSG fron 12/16 showed severe central sleep apnea, /h.  She is on ASV EPAP: 7 cm of water, pressure support: 15/5 cm of water.  Compliance card shows 100% ASV usage for 9.5 hours nightly.  Her average AHI is 1.7.  She loves her ASV machine and admits she cannot sleep without it.  Wakens rested and denies daytime hypersomnolence.  She has dropped another 22 pounds over the past year through exercise modification stays very active biking and working out at the gym.        ROS   Denies any recent fevers or chills. No nausea or vomiting. No chest pains or shortness of breath.     Allergies   Allergen Reactions   • Asa [Aspirin] Anaphylaxis   • Nsaids Anaphylaxis   • Sulfa Drugs Anaphylaxis   • Augmentin Anaphylaxis   • Contrast Media With Iodine [Iodine] Itching       Current medicines (including changes today)  Current Outpatient Medications   Medication Sig Dispense Refill   • tizanidine (ZANAFLEX) 4 MG Tab Take 1 Tab by mouth at bedtime as needed (joint pain / muscle tightness). 30 Tab 1   • famotidine (PEPCID) 20 MG Tab Take 1 Tab by mouth 2 times a day. 60 Tab 6   • azelastine (ASTELIN) 137 MCG/SPRAY nasal spray Reading 1 Spray in nose 2 times a day. 30 mL 4   • atorvastatin (LIPITOR) 20 MG Tab Take 1 Tab by mouth every day. 90 Tab 3   • valacyclovir (VALTREX) 1 GM Tab Take 1 Tab by mouth 2 times a day. 180 Tab 3   • phentermine 15 MG capsule Take 15 mg by mouth every morning.     • NON SPECIFIED ResMed AirMini  with P10 Setup Pack and Mask     Add Travel Case 1 Each 0   • montelukast (SINGULAIR) 10 MG Tab Take 1 Tab by mouth every day. 30 Tab 11   • epinephrine (EPIPEN) 0.3 MG/0.3ML (1:1000) injection 0.3 mL by Intramuscular route Once PRN for 1 dose. 1 Each 3   • gabapentin (NEURONTIN) 300 MG Cap Take one q hs for pain. Increase to tid as tolerated for pain control. (Patient not taking: Reported on 8/19/2020) 90 Cap 1   • polymixin-trimethoprim (POLYTRIM) 71515-0.1 UNIT/ML-% Solution Place 1 Drop in both eyes every 4 hours. (Patient not taking: Reported on 8/19/2020) 10 mL 0     No current facility-administered medications for this visit.        Patient Active Problem List    Diagnosis Date Noted   • Hypertrophy of nasal turbinates 04/07/2020   • Chronic pansinusitis 04/07/2020   • Gastric polyps 10/24/2019   • BMI 28.0-28.9,adult 10/24/2019   • HSV-2 infection 10/24/2019   • Impaired fasting glucose 10/24/2019   • Family history of melanoma 02/21/2018   • CARI (obstructive sleep apnea) 12/08/2016   • Central sleep apnea 12/08/2016   • Chronic sinusitis 11/11/2016   • Apnea 11/11/2016   • Aspirin-sensitive asthma with nasal polyps 08/18/2016   • Menopause 03/19/2014   • GERD (gastroesophageal reflux disease) 03/19/2014   • Ocular migraine 01/18/2012   • Hyperlipidemia 01/18/2012   • Anxiety        Family History   Problem Relation Age of Onset   • Cancer Father         prostate   • Hypertension Father    • Arthritis Father    • Prostate cancer Father    • Cancer Sister         melanoma   • Cancer Paternal Aunt         breast   • Diabetes Other    • Sleep Apnea Neg Hx        She  has a past medical history of Allergic rhinitis, Allergy, Anxiety, Chickenpox, Heart burn, High cholesterol, Influenza, Psychiatric problem, Sleep apnea, Snoring, and Tubal ligation. She also has no past medical history of Breast cancer (HCC).  She  has a past surgical history that includes tonsillectomy (1981); appendectomy (3/2003); tubal  "coagulation laparoscopic bilateral (2007); abdominoplasty (2009); foot orif (1988); bunionectomy (5/18/2011); cheilectomy (5/18/2011); bone spur excision (5/18/2011); eswt (5/18/2011); and gastroscopy-endo (12/10/2019).       Objective:   Ht 1.702 m (5' 7\")   Wt 79.4 kg (175 lb)   LMP 07/12/2011   BMI 27.41 kg/m²     Physical Exam:  Constitutional: Alert, no distress, well-groomed.  Skin: No rashes in visible areas.  Eye: Round. Conjunctiva clear, lids normal. No icterus.   ENMT: Lips pink without lesions, good dentition, moist mucous membranes. Phonation normal.  Neck: No masses, no thyromegaly. Moves freely without pain.  CV: Pulse as reported by patient  Respiratory: Unlabored respiratory effort, no cough or audible wheeze  Psych: Alert and oriented x3, normal affect and mood.       Assessment and Plan:   The following treatment plan was discussed:   1.  Central sleep apnea  There are no diagnoses linked to this encounter.  The patient shows excellent compliance and response to ASV therapy for treatment of severe central sleep apnea.  She is benefiting from therapy.    Continue ASV at above pressure settings.    Prescription submitted to DME for CPAP supplies for the following year.    Follow-up: Return in about 1 year (around 8/19/2021).         "

## 2020-09-08 DIAGNOSIS — K21.9 GASTROESOPHAGEAL REFLUX DISEASE WITHOUT ESOPHAGITIS: ICD-10-CM

## 2020-09-08 RX ORDER — PHENTERMINE HYDROCHLORIDE 37.5 MG/1
TABLET ORAL
Qty: 30 TAB | Refills: 1 | Status: SHIPPED | OUTPATIENT
Start: 2020-09-08 | End: 2020-12-07 | Stop reason: SDUPTHER

## 2020-09-08 RX ORDER — OMEPRAZOLE 20 MG/1
20 CAPSULE, DELAYED RELEASE ORAL DAILY
Qty: 30 CAP | Refills: 5 | Status: SHIPPED | OUTPATIENT
Start: 2020-09-08 | End: 2021-12-21

## 2020-09-08 NOTE — TELEPHONE ENCOUNTER
Received request via: Pharmacy    Was the patient seen in the last year in this department? Yes  4/7/20  Does the patient have an active prescription (recently filled or refills available) for medication(s) requested? No

## 2020-09-08 NOTE — TELEPHONE ENCOUNTER
Received request via: Pharmacy    Was the patient seen in the last year in this department? Yes LOV 4/7/2020    Does the patient have an active prescription (recently filled or refills available) for medication(s) requested? No

## 2020-09-11 ENCOUNTER — PATIENT MESSAGE (OUTPATIENT)
Dept: MEDICAL GROUP | Facility: LAB | Age: 50
End: 2020-09-11

## 2020-10-22 ENCOUNTER — TELEPHONE (OUTPATIENT)
Dept: MEDICAL GROUP | Facility: LAB | Age: 50
End: 2020-10-22

## 2020-10-22 DIAGNOSIS — Z11.59 ENCOUNTER FOR SCREENING FOR OTHER VIRAL DISEASES: ICD-10-CM

## 2020-10-22 NOTE — TELEPHONE ENCOUNTER
Patient was exposed to positive Covid on Saturday and now is having a headache. Requesting a Covid test and would like to do tomorrow. Patient informed about drive through testing

## 2020-10-23 ENCOUNTER — HOSPITAL ENCOUNTER (OUTPATIENT)
Dept: LAB | Facility: MEDICAL CENTER | Age: 50
End: 2020-10-23
Attending: NURSE PRACTITIONER
Payer: COMMERCIAL

## 2020-10-23 LAB
COVID ORDER STATUS COVID19: NORMAL
SARS-COV-2 RNA RESP QL NAA+PROBE: NOTDETECTED
SPECIMEN SOURCE: NORMAL

## 2020-10-23 PROCEDURE — C9803 HOPD COVID-19 SPEC COLLECT: HCPCS

## 2020-10-23 PROCEDURE — U0003 INFECTIOUS AGENT DETECTION BY NUCLEIC ACID (DNA OR RNA); SEVERE ACUTE RESPIRATORY SYNDROME CORONAVIRUS 2 (SARS-COV-2) (CORONAVIRUS DISEASE [COVID-19]), AMPLIFIED PROBE TECHNIQUE, MAKING USE OF HIGH THROUGHPUT TECHNOLOGIES AS DESCRIBED BY CMS-2020-01-R: HCPCS

## 2020-10-26 ENCOUNTER — HOSPITAL ENCOUNTER (OUTPATIENT)
Facility: MEDICAL CENTER | Age: 50
End: 2020-10-26
Attending: PHYSICIAN ASSISTANT
Payer: COMMERCIAL

## 2020-10-26 ENCOUNTER — OFFICE VISIT (OUTPATIENT)
Dept: URGENT CARE | Facility: CLINIC | Age: 50
End: 2020-10-26
Payer: COMMERCIAL

## 2020-10-26 VITALS
RESPIRATION RATE: 16 BRPM | SYSTOLIC BLOOD PRESSURE: 140 MMHG | DIASTOLIC BLOOD PRESSURE: 88 MMHG | TEMPERATURE: 98.1 F | HEART RATE: 88 BPM | BODY MASS INDEX: 27.47 KG/M2 | WEIGHT: 175 LBS | OXYGEN SATURATION: 100 % | HEIGHT: 67 IN

## 2020-10-26 DIAGNOSIS — J34.89 RHINORRHEA: ICD-10-CM

## 2020-10-26 DIAGNOSIS — Z20.822 CLOSE EXPOSURE TO COVID-19 VIRUS: ICD-10-CM

## 2020-10-26 PROCEDURE — U0003 INFECTIOUS AGENT DETECTION BY NUCLEIC ACID (DNA OR RNA); SEVERE ACUTE RESPIRATORY SYNDROME CORONAVIRUS 2 (SARS-COV-2) (CORONAVIRUS DISEASE [COVID-19]), AMPLIFIED PROBE TECHNIQUE, MAKING USE OF HIGH THROUGHPUT TECHNOLOGIES AS DESCRIBED BY CMS-2020-01-R: HCPCS

## 2020-10-26 PROCEDURE — 99214 OFFICE O/P EST MOD 30 MIN: CPT | Mod: CS | Performed by: PHYSICIAN ASSISTANT

## 2020-10-26 ASSESSMENT — ENCOUNTER SYMPTOMS
FEVER: 0
CHILLS: 0

## 2020-10-26 ASSESSMENT — FIBROSIS 4 INDEX: FIB4 SCORE: 0.98

## 2020-10-27 DIAGNOSIS — Z20.822 CLOSE EXPOSURE TO COVID-19 VIRUS: ICD-10-CM

## 2020-10-27 NOTE — PATIENT INSTRUCTIONS
COVID-19 results pending    *Return home and continue self-isolation until you get your test result back.  If positive: You will be called by [facility] staff.    · Stay home and continue self isolation until:  · At least ten (10) days have passed since symptoms first appeared AND  · At least 24 hours have passed from last fever without the use of fever-reducing medications AND  · Symptoms have improved (eg: cough or shortness of breath)    If negative: You will be getting a [employer] message or a letter from [facility].  · Stay home until you have no fever for 24 hours and your symptoms (cough and shortness of breath) have resolved.    You may call [facility] ER Discharge Culture Line at [phone] for results/questions.    *Even after the above are met, maintain social distance from others (at least 6 feet) and practice frequent hand washing.    *Wear a face mask in public places.

## 2020-10-27 NOTE — PROGRESS NOTES
Subjective:   Fartun Mittal is a 50 y.o. female who presents for Diarrhea (headache, fatigue,  positive for COVID-19, congestions )        This is a new problem.  Patient presents with concerns of a new positive Covid exposure.  States that her ex- tested positive yesterday and she spent much of the weekend in his company.  She was tested a week ago due to a different positive exposure per patient.  Last week she was having some congestion and headaches.  She continues to have some fatigue and runny nose.  No cough or shortness of breath no difficulty breathing.  No aggravating or alleviating factors.  She is not taking any medications for symptoms.    Review of Systems   Constitutional: Positive for malaise/fatigue. Negative for chills and fever.   HENT: Positive for congestion.        PMH:  has a past medical history of Allergic rhinitis, Allergy, Anxiety, Chickenpox, Heart burn, High cholesterol, Influenza, Psychiatric problem, Sleep apnea, Snoring, and Tubal ligation. She also has no past medical history of Breast cancer (HCC).  MEDS:   Current Outpatient Medications:   •  omeprazole (PRILOSEC) 20 MG delayed-release capsule, Take 1 Cap by mouth every day., Disp: 30 Cap, Rfl: 5  •  tizanidine (ZANAFLEX) 4 MG Tab, Take 1 Tab by mouth at bedtime as needed (joint pain / muscle tightness)., Disp: 30 Tab, Rfl: 1  •  gabapentin (NEURONTIN) 300 MG Cap, Take one q hs for pain. Increase to tid as tolerated for pain control. (Patient not taking: Reported on 8/19/2020), Disp: 90 Cap, Rfl: 1  •  famotidine (PEPCID) 20 MG Tab, Take 1 Tab by mouth 2 times a day., Disp: 60 Tab, Rfl: 6  •  azelastine (ASTELIN) 137 MCG/SPRAY nasal spray, Spray 1 Spray in nose 2 times a day., Disp: 30 mL, Rfl: 4  •  atorvastatin (LIPITOR) 20 MG Tab, Take 1 Tab by mouth every day., Disp: 90 Tab, Rfl: 3  •  valacyclovir (VALTREX) 1 GM Tab, Take 1 Tab by mouth 2 times a day., Disp: 180 Tab, Rfl: 3  •  phentermine 15 MG  "capsule, Take 15 mg by mouth every morning., Disp: , Rfl:   •  polymixin-trimethoprim (POLYTRIM) 12000-7.1 UNIT/ML-% Solution, Place 1 Drop in both eyes every 4 hours. (Patient not taking: Reported on 8/19/2020), Disp: 10 mL, Rfl: 0  •  NON SPECIFIED, ResMed AirMini with P10 Setup Pack and Mask   Add Travel Case, Disp: 1 Each, Rfl: 0  •  montelukast (SINGULAIR) 10 MG Tab, Take 1 Tab by mouth every day., Disp: 30 Tab, Rfl: 11  •  epinephrine (EPIPEN) 0.3 MG/0.3ML (1:1000) injection, 0.3 mL by Intramuscular route Once PRN for 1 dose., Disp: 1 Each, Rfl: 3  ALLERGIES:   Allergies   Allergen Reactions   • Asa [Aspirin] Anaphylaxis   • Nsaids Anaphylaxis   • Sulfa Drugs Anaphylaxis   • Augmentin Anaphylaxis   • Contrast Media With Iodine [Iodine] Itching     SURGHX:   Past Surgical History:   Procedure Laterality Date   • GASTROSCOPY-ENDO  12/10/2019    Procedure: GASTROSCOPY-POSSIBLE BIOPSY, DILATION, POLYPECTOMY, CONTROL OF HEMORRHAGE;  Surgeon: Pb Abdi M.D.;  Location: [facility];  Service: Gastroenterology   • BUNIONECTOMY  5/18/2011    Performed by MOLLY GARCIA at [facility]   • CHEILECTOMY  5/18/2011    Performed by MOLLY GARCIA at [facility]   • BONE SPUR EXCISION  5/18/2011    Performed by MOLLY GARCIA at [facility]   • ESWT  5/18/2011    Performed by MOLLY GARCIA at [facility]   • ABDOMINOPLASTY  2009   • TUBAL COAGULATION LAPAROSCOPIC BILATERAL  2007   • APPENDECTOMY  3/2003   • FOOT ORIF  1988    left   • TONSILLECTOMY  1981     SOCHX:  reports that she has never smoked. She has never used smokeless tobacco. She reports current alcohol use. She reports that she does not use drugs.  FH: Family history was reviewed, no pertinent findings to report   Objective:   /88   Pulse 88   Temp 36.7 °C (98.1 °F) (Temporal)   Resp 16   Ht 1.702 m (5' 7\")   Wt 79.4 kg (175 lb)   LMP 07/12/2011   SpO2 100%   " BMI 27.41 kg/m²   Physical Exam  Vitals signs reviewed.   Constitutional:       General: She is not in acute distress.     Appearance: Normal appearance. She is well-developed. She is not toxic-appearing.   HENT:      Head: Normocephalic and atraumatic.      Right Ear: External ear normal.      Left Ear: External ear normal.      Nose: Mucosal edema, congestion and rhinorrhea present.      Mouth/Throat:      Lips: Pink.      Mouth: Mucous membranes are moist.      Pharynx: Oropharynx is clear.   Eyes:      General: Gaze aligned appropriately.   Neck:      Musculoskeletal: Neck supple.   Cardiovascular:      Rate and Rhythm: Normal rate and regular rhythm.      Heart sounds: Normal heart sounds, S1 normal and S2 normal.   Pulmonary:      Effort: Pulmonary effort is normal. No respiratory distress.      Breath sounds: Normal breath sounds. No stridor. No decreased breath sounds, wheezing, rhonchi or rales.   Skin:     General: Skin is warm and dry.      Capillary Refill: Capillary refill takes less than 2 seconds.   Neurological:      Mental Status: She is alert and oriented to person, place, and time.      Comments: CN2-12 grossly intact   Psychiatric:         Speech: Speech normal.         Behavior: Behavior normal.           Assessment/Plan:   1. Close exposure to COVID-19 virus  - COVID/SARS COV-2 PCR; Future    2. Rhinorrhea    Patient to quarantine.  Symptomatic care.  Return ED precautions given.  Patient will be contacted with result.    Differential diagnosis, natural history, supportive care, and indications for immediate follow-up discussed.

## 2020-10-28 ENCOUNTER — TELEPHONE (OUTPATIENT)
Dept: URGENT CARE | Facility: CLINIC | Age: 50
End: 2020-10-28

## 2020-10-29 NOTE — TELEPHONE ENCOUNTER
----- Message from Mario May P.A.-C. sent at 10/28/2020  8:05 AM PDT -----  Please notify patient that testing is negative.    Thank you!  JH

## 2020-11-16 RX ORDER — PHENTERMINE HYDROCHLORIDE 15 MG/1
15 CAPSULE ORAL EVERY MORNING
Qty: 30 CAP | Refills: 0 | Status: SHIPPED | OUTPATIENT
Start: 2020-11-16 | End: 2020-12-16

## 2020-11-22 ENCOUNTER — OFFICE VISIT (OUTPATIENT)
Dept: URGENT CARE | Facility: CLINIC | Age: 50
End: 2020-11-22
Payer: COMMERCIAL

## 2020-11-22 ENCOUNTER — HOSPITAL ENCOUNTER (OUTPATIENT)
Facility: MEDICAL CENTER | Age: 50
End: 2020-11-22
Attending: FAMILY MEDICINE
Payer: COMMERCIAL

## 2020-11-22 VITALS
HEIGHT: 67 IN | TEMPERATURE: 97.5 F | WEIGHT: 190 LBS | HEART RATE: 84 BPM | BODY MASS INDEX: 29.82 KG/M2 | RESPIRATION RATE: 20 BRPM | SYSTOLIC BLOOD PRESSURE: 134 MMHG | OXYGEN SATURATION: 95 % | DIASTOLIC BLOOD PRESSURE: 84 MMHG

## 2020-11-22 DIAGNOSIS — R05.9 COUGH: ICD-10-CM

## 2020-11-22 DIAGNOSIS — R53.83 FATIGUE, UNSPECIFIED TYPE: ICD-10-CM

## 2020-11-22 DIAGNOSIS — R51.9 NONINTRACTABLE HEADACHE, UNSPECIFIED CHRONICITY PATTERN, UNSPECIFIED HEADACHE TYPE: ICD-10-CM

## 2020-11-22 PROCEDURE — 99214 OFFICE O/P EST MOD 30 MIN: CPT | Performed by: FAMILY MEDICINE

## 2020-11-22 PROCEDURE — U0003 INFECTIOUS AGENT DETECTION BY NUCLEIC ACID (DNA OR RNA); SEVERE ACUTE RESPIRATORY SYNDROME CORONAVIRUS 2 (SARS-COV-2) (CORONAVIRUS DISEASE [COVID-19]), AMPLIFIED PROBE TECHNIQUE, MAKING USE OF HIGH THROUGHPUT TECHNOLOGIES AS DESCRIBED BY CMS-2020-01-R: HCPCS

## 2020-11-22 ASSESSMENT — FIBROSIS 4 INDEX: FIB4 SCORE: 0.98

## 2020-11-25 ASSESSMENT — ENCOUNTER SYMPTOMS
EYE DISCHARGE: 0
VOMITING: 0
MYALGIAS: 1
EYE REDNESS: 0
WEIGHT LOSS: 0
DIARRHEA: 0
NAUSEA: 0

## 2020-11-30 ENCOUNTER — HOSPITAL ENCOUNTER (OUTPATIENT)
Dept: LAB | Facility: MEDICAL CENTER | Age: 50
End: 2020-11-30
Attending: PATHOLOGY
Payer: COMMERCIAL

## 2020-11-30 PROCEDURE — U0003 INFECTIOUS AGENT DETECTION BY NUCLEIC ACID (DNA OR RNA); SEVERE ACUTE RESPIRATORY SYNDROME CORONAVIRUS 2 (SARS-COV-2) (CORONAVIRUS DISEASE [COVID-19]), AMPLIFIED PROBE TECHNIQUE, MAKING USE OF HIGH THROUGHPUT TECHNOLOGIES AS DESCRIBED BY CMS-2020-01-R: HCPCS

## 2020-12-01 LAB — COVID ORDER STATUS COVID19: NORMAL

## 2020-12-02 LAB
SARS-COV-2 RNA RESP QL NAA+PROBE: NOTDETECTED
SPECIMEN SOURCE: NORMAL

## 2020-12-04 DIAGNOSIS — B00.9 HSV-2 INFECTION: ICD-10-CM

## 2020-12-04 RX ORDER — VALACYCLOVIR HYDROCHLORIDE 1 G/1
1000 TABLET, FILM COATED ORAL 2 TIMES DAILY
Qty: 180 TAB | Refills: 3 | Status: SHIPPED | OUTPATIENT
Start: 2020-12-04

## 2020-12-07 ENCOUNTER — TELEPHONE (OUTPATIENT)
Dept: MEDICAL GROUP | Facility: LAB | Age: 50
End: 2020-12-07

## 2020-12-07 NOTE — TELEPHONE ENCOUNTER
----- Message from JUDD Merritt sent at 12/6/2020  4:20 PM PST -----  Regarding: FW: Non-Urgent Medical Question  Contact: [phone]  Ok to do note. thanks  ----- Message -----  From: Selin Gaines Med [facility]'t  Sent: 12/4/2020  11:32 AM PST  To: JUDD Merritt  Subject: FW: Non-Urgent Medical Question                    ----- Message -----  From: Fartun Mittal  Sent: 12/4/2020  11:16 AM PST  To: [facility]  Subject: Non-Urgent Medical Question                      Ernie Mosquera,  I hope you are well.  Andrea and I are feeling much better and I will be returning to work on Wednesday December 9th. The [employer]ML[employer]AVE [employer] PA[employer] SICK LEAVE [employer]M will pay for my sick leave during the time I was quarantined. Human Resources would like a letter excusing these sick days.  I was quarantined work days of November 24th, 30th, December 1-4 and December 7-8.  I look forward to going back to work and having this behind me.  Thank you for all your help!  Krissy

## 2020-12-07 NOTE — LETTER
December 7, 2020         Patient: Fartun Mittal   YOB: 1970   Date of Visit: 12/7/2020           To Whom it May Concern:    Please excuse Fartun Mittal from work dues to illness November 24th, 30th, December 1-4 and December 7-8.    If you have any questions or concerns, please don't hesitate to call.        Sincerely,           JYOTHI MerrittPELVER.  Electronically Signed

## 2020-12-20 ENCOUNTER — PATIENT MESSAGE (OUTPATIENT)
Dept: MEDICAL GROUP | Facility: LAB | Age: 50
End: 2020-12-20

## 2020-12-20 DIAGNOSIS — M79.605 LEFT LEG PAIN: ICD-10-CM
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
## 2021-02-09 ENCOUNTER — PATIENT MESSAGE (OUTPATIENT)
Dept: MEDICAL GROUP | Facility: LAB | Age: 51
End: 2021-02-09

## 2021-02-09 DIAGNOSIS — Z12.31 ENCOUNTER FOR SCREENING MAMMOGRAM FOR MALIGNANT NEOPLASM OF BREAST: ICD-10-CM

## 2021-02-09 DIAGNOSIS — T78.2XXD ANAPHYLACTIC REACTION, SUBSEQUENT ENCOUNTER: ICD-10-CM

## 2021-02-09 RX ORDER — EPINEPHRINE 0.3 MG/.3ML
0.3 INJECTION SUBCUTANEOUS ONCE
Qty: 2 EACH | Refills: 0 | Status: SHIPPED | OUTPATIENT
Start: 2021-02-09 | End: 2021-02-09

## 2021-02-09 NOTE — TELEPHONE ENCOUNTER
From: Fartun Mittal  To: Nurse Practicioner Eveline Garcia  Sent: 2/9/2021 7:45 AM PST  Subject: Non-Urgent Medical Question    Good morning Eveline,  I believe I am due for a mammogram. Could I get a referral :)    Also, I need a new perscription for Epi-Pens. Are they still outragiously expensive? LOL    Thank you for your help,  Connor

## 2021-02-11 NOTE — TELEPHONE ENCOUNTER
Received request via: Pharmacy    Was the patient seen in the last year in this department? Yes  4/7/2020  Does the patient have an active prescription (recently filled or refills available) for medication(s) requested? No

## 2021-02-12 RX ORDER — PHENTERMINE HYDROCHLORIDE 37.5 MG/1
37.5 TABLET ORAL
Qty: 30 TABLET | Refills: 1 | Status: SHIPPED | OUTPATIENT
Start: 2021-02-12 | End: 2021-04-19 | Stop reason: SDUPTHER

## 2021-04-05 RX ORDER — PHENTERMINE HYDROCHLORIDE 37.5 MG/1
37.5 TABLET ORAL
Qty: 30 TABLET | Refills: 0 | OUTPATIENT
Start: 2021-04-05 | End: 2021-05-05

## 2021-04-14 ENCOUNTER — HOSPITAL ENCOUNTER (OUTPATIENT)
Dept: RADIOLOGY | Facility: MEDICAL CENTER | Age: 51
End: 2021-04-14
Attending: NURSE PRACTITIONER
Payer: COMMERCIAL

## 2021-04-14 DIAGNOSIS — Z12.31 ENCOUNTER FOR SCREENING MAMMOGRAM FOR MALIGNANT NEOPLASM OF BREAST: ICD-10-CM

## 2021-04-14 PROCEDURE — 77063 BREAST TOMOSYNTHESIS BI: CPT

## 2021-04-16 RX ORDER — DOXYCYCLINE HYCLATE 100 MG/1
CAPSULE ORAL
COMMUNITY
Start: 2021-04-07 | End: 2021-04-17

## 2021-04-16 NOTE — PROGRESS NOTES
Reconciled Doxycycline pt was prescribed from [facility] and added to pt med list. No orders placed.   BERONICA Forbes

## 2021-04-19 ENCOUNTER — OFFICE VISIT (OUTPATIENT)
Dept: MEDICAL GROUP | Facility: LAB | Age: 51
End: 2021-04-19
Payer: COMMERCIAL

## 2021-04-19 VITALS
WEIGHT: 186 LBS | RESPIRATION RATE: 16 BRPM | HEART RATE: 66 BPM | DIASTOLIC BLOOD PRESSURE: 80 MMHG | OXYGEN SATURATION: 95 % | SYSTOLIC BLOOD PRESSURE: 130 MMHG | BODY MASS INDEX: 29.19 KG/M2 | TEMPERATURE: 98.2 F | HEIGHT: 67 IN

## 2021-04-19 DIAGNOSIS — Z88.6 ASPIRIN-SENSITIVE ASTHMA WITH NASAL POLYPS: ICD-10-CM

## 2021-04-19 DIAGNOSIS — J33.9 ASPIRIN-SENSITIVE ASTHMA WITH NASAL POLYPS: ICD-10-CM

## 2021-04-19 DIAGNOSIS — J45.909 ASPIRIN-SENSITIVE ASTHMA WITH NASAL POLYPS: ICD-10-CM

## 2021-04-19 DIAGNOSIS — Z91.09 ENVIRONMENTAL ALLERGIES: ICD-10-CM

## 2021-04-19 DIAGNOSIS — J20.9 ACUTE BRONCHITIS, UNSPECIFIED ORGANISM: ICD-10-CM

## 2021-04-19 PROCEDURE — 96372 THER/PROPH/DIAG INJ SC/IM: CPT | Performed by: NURSE PRACTITIONER

## 2021-04-19 PROCEDURE — 99214 OFFICE O/P EST MOD 30 MIN: CPT | Mod: 25 | Performed by: NURSE PRACTITIONER

## 2021-04-19 RX ORDER — MONTELUKAST SODIUM 10 MG/1
TABLET ORAL
COMMUNITY
End: 2021-11-08 | Stop reason: SDUPTHER

## 2021-04-19 RX ORDER — PHENTERMINE HYDROCHLORIDE 37.5 MG/1
37.5 TABLET ORAL
Qty: 30 TABLET | Refills: 2 | Status: SHIPPED | OUTPATIENT
Start: 2021-04-19 | End: 2021-05-19

## 2021-04-19 RX ORDER — TRIAMCINOLONE ACETONIDE 40 MG/ML
40 INJECTION, SUSPENSION INTRA-ARTICULAR; INTRAMUSCULAR ONCE
Status: COMPLETED | OUTPATIENT
Start: 2021-04-19 | End: 2021-04-19

## 2021-04-19 RX ORDER — AZELASTINE HYDROCHLORIDE 137 UG/1
SPRAY, METERED NASAL
COMMUNITY
End: 2021-11-22 | Stop reason: SDUPTHER

## 2021-04-19 RX ADMIN — TRIAMCINOLONE ACETONIDE 40 MG: 40 INJECTION, SUSPENSION INTRA-ARTICULAR; INTRAMUSCULAR at 17:07

## 2021-04-19 ASSESSMENT — PATIENT HEALTH QUESTIONNAIRE - PHQ9: CLINICAL INTERPRETATION OF PHQ2 SCORE: 0

## 2021-04-19 ASSESSMENT — FIBROSIS 4 INDEX: FIB4 SCORE: 1

## 2021-04-19 NOTE — PROGRESS NOTES
"Chief Complaint   Patient presents with   • Seasonal Allergies   • Other     breast exam /        HPI   Krissy is a 51-year-old established female here with a couple of issues:  #1- URI: New onset for the past 2 to 3 weeks.  Symptoms started with sinus pressure, congestion and cough that is productive of colored phlegm.  Called ENT last week and was rx doxycycline - on day 7 of 10 d.  Improving.  +slight pressure to sinus regions.  Denies SOB but hears self wheeze in AM.  Requesting kenalog shot for allergies while here.  Has a chronic diagnoses given by ENT of aspirin sensitive asthma with nasal polyps.  Also on Singulair, Flonase and Astelin.  #2- left breast lump:  Found lump while sitting on plane a few weeks ago and now can't find it.  Non-painful.  Neg mammogram 4/14/2021.  No hormone replacement therapy.  #3- overweight:  requesting refill of phentermine - Ran out 2 weeks ago.  Phentermine really helps suppress her appetite.  Drinking protein shakes AM / lunch and meal at night.  Exercising a lot on her bike.  Fluctuates between 190 - 175 lbs.  Goal is 160 lbs.  Denies increased anxiety or sleep disturbance with phentermine.        Past medical, surgical, family, and social history is reviewed and updated in Epic chart by me today.   Medications and allergies reviewed and updated in Epic chart by me today.     ROS:   As documented in history of present illness above    Exam:  /80 (BP Location: Right arm, Patient Position: Sitting, BP Cuff Size: Large adult)   Pulse 66   Temp 36.8 °C (98.2 °F)   Resp 16   Ht 1.702 m (5' 7\")   Wt 84.4 kg (186 lb)   SpO2 95%   Constitutional: Alert, no distress, plus 3 vital signs  Skin:  Warm, dry, no rashes invisible areas  Eye: Equal, round and reactive, conjunctiva clear  Respiratory: Unlabored respiration.  She does have expiratory rhonchi throughout without specific areas of decreased lung sounds.  She is able to speak in full sentences without " distress.  Cardiovascular: Normal rate and rhythm, no murmur, no edema  Breast exam: Fibrocystic dense breast tissue without specific masses, dimpling or areas of tenderness.  Psych: Alert, pleasant, well-groomed, normal affect    Assessment / Plan / Medical Decision makin. Environmental allergies  -Given a Kenalog shot as requested by patient.  - triamcinolone acetonide (KENALOG-40) injection 40 mg    2. Acute bronchitis, unspecified organism  -Recommend chest x-ray although she states that she is doing much better with doxycycline and is able to ride up to 50 miles on her bike without distress.  She is agreeable to a chest x-ray within 72 hours if she does not continue to improve with doxycycline and Kenalog shot given today.  Encouraged her to use albuterol 1 to 2 puffs every 4-6 hours as needed for shortness of breath or wheezing, continue Singulair, Astelin and Flonase.  She is fully vaccinated for Covid and symptoms are several weeks old, low likelihood of being contagious if she does have Covid.  - triamcinolone acetonide (KENALOG-40) injection 40 mg    3. Aspirin-sensitive asthma with nasal polyps  -Followed closely by ENT.  Completing antibiotics as above.    4. BMI 29.0-29.9,adult  -Discussed risk versus benefit of phentermine with her in depth including potential increased risk of hypertension, tachycardia, heart arrhythmias, insomnia and anxiety, all of which she verbalizes understanding of and would still like to move forward with taking phentermine.  - phentermine (ADIPEX-P) 37.5 MG tablet; Take 1 tablet by mouth every morning before breakfast for 30 days.  Dispense: 30 tablet; Refill: 2    In terms of her breast concern today, I do not appreciate a breast mass.  Reviewed last mammogram.  Encouraged her to continue monthly self breast exams and contact me if she becomes concerned about any new lumps or masses.

## 2021-05-20 DIAGNOSIS — E78.49 OTHER HYPERLIPIDEMIA: ICD-10-CM

## 2021-05-20 RX ORDER — ATORVASTATIN CALCIUM 20 MG/1
20 TABLET, FILM COATED ORAL DAILY
Qty: 90 TABLET | Refills: 3 | Status: SHIPPED | OUTPATIENT
Start: 2021-05-20 | End: 2021-07-20 | Stop reason: SDUPTHER

## 2021-05-20 NOTE — TELEPHONE ENCOUNTER
Received request via: Patient    Was the patient seen in the last year in this department? Yes  LOV 04/19/2021  Does the patient have an active prescription (recently filled or refills available) for medication(s) requested? No

## 2021-06-15 ENCOUNTER — PATIENT MESSAGE (OUTPATIENT)
Dept: MEDICAL GROUP | Facility: LAB | Age: 51
End: 2021-06-15

## 2021-06-15 DIAGNOSIS — Z00.00 PREVENTATIVE HEALTH CARE: ICD-10-CM

## 2021-06-30 ENCOUNTER — HOSPITAL ENCOUNTER (OUTPATIENT)
Dept: LAB | Facility: MEDICAL CENTER | Age: 51
End: 2021-06-30
Attending: NURSE PRACTITIONER
Payer: COMMERCIAL

## 2021-06-30 DIAGNOSIS — Z00.00 PREVENTATIVE HEALTH CARE: ICD-10-CM

## 2021-06-30 LAB
25(OH)D3 SERPL-MCNC: 24 NG/ML (ref 30–100)
ALBUMIN SERPL BCP-MCNC: 4.6 G/DL (ref 3.2–4.9)
ALBUMIN/GLOB SERPL: 1.6 G/DL
ALP SERPL-CCNC: 89 U/L (ref 30–99)
ALT SERPL-CCNC: 41 U/L (ref 2–50)
ANION GAP SERPL CALC-SCNC: 9 MMOL/L (ref 7–16)
AST SERPL-CCNC: 39 U/L (ref 12–45)
BASOPHILS # BLD AUTO: 1.3 % (ref 0–1.8)
BASOPHILS # BLD: 0.09 K/UL (ref 0–0.12)
BILIRUB SERPL-MCNC: 0.6 MG/DL (ref 0.1–1.5)
BUN SERPL-MCNC: 10 MG/DL (ref 8–22)
CALCIUM SERPL-MCNC: 9.5 MG/DL (ref 8.5–10.5)
CHLORIDE SERPL-SCNC: 108 MMOL/L (ref 96–112)
CHOLEST SERPL-MCNC: 207 MG/DL (ref 100–199)
CO2 SERPL-SCNC: 25 MMOL/L (ref 20–33)
CREAT SERPL-MCNC: 0.73 MG/DL (ref 0.5–1.4)
EOSINOPHIL # BLD AUTO: 0.42 K/UL (ref 0–0.51)
EOSINOPHIL NFR BLD: 6.2 % (ref 0–6.9)
ERYTHROCYTE [DISTWIDTH] IN BLOOD BY AUTOMATED COUNT: 45.6 FL (ref 35.9–50)
EST. AVERAGE GLUCOSE BLD GHB EST-MCNC: 117 MG/DL
FASTING STATUS PATIENT QL REPORTED: NORMAL
GLOBULIN SER CALC-MCNC: 2.9 G/DL (ref 1.9–3.5)
GLUCOSE SERPL-MCNC: 110 MG/DL (ref 65–99)
HBA1C MFR BLD: 5.7 % (ref 4–5.6)
HCT VFR BLD AUTO: 43.8 % (ref 37–47)
HDLC SERPL-MCNC: 65 MG/DL
HGB BLD-MCNC: 14.9 G/DL (ref 12–16)
IMM GRANULOCYTES # BLD AUTO: 0.01 K/UL (ref 0–0.11)
IMM GRANULOCYTES NFR BLD AUTO: 0.1 % (ref 0–0.9)
LDLC SERPL CALC-MCNC: 121 MG/DL
LYMPHOCYTES # BLD AUTO: 2.34 K/UL (ref 1–4.8)
LYMPHOCYTES NFR BLD: 34.4 % (ref 22–41)
MCH RBC QN AUTO: 32.7 PG (ref 27–33)
MCHC RBC AUTO-ENTMCNC: 34 G/DL (ref 33.6–35)
MCV RBC AUTO: 96.3 FL (ref 81.4–97.8)
MONOCYTES # BLD AUTO: 0.53 K/UL (ref 0–0.85)
MONOCYTES NFR BLD AUTO: 7.8 % (ref 0–13.4)
NEUTROPHILS # BLD AUTO: 3.42 K/UL (ref 2–7.15)
NEUTROPHILS NFR BLD: 50.2 % (ref 44–72)
NRBC # BLD AUTO: 0 K/UL
NRBC BLD-RTO: 0 /100 WBC
PLATELET # BLD AUTO: 275 K/UL (ref 164–446)
PMV BLD AUTO: 10.1 FL (ref 9–12.9)
POTASSIUM SERPL-SCNC: 4.4 MMOL/L (ref 3.6–5.5)
PROT SERPL-MCNC: 7.5 G/DL (ref 6–8.2)
RBC # BLD AUTO: 4.55 M/UL (ref 4.2–5.4)
SODIUM SERPL-SCNC: 142 MMOL/L (ref 135–145)
TRIGL SERPL-MCNC: 105 MG/DL (ref 0–149)
TSH SERPL DL<=0.005 MIU/L-ACNC: 1.74 UIU/ML (ref 0.38–5.33)
WBC # BLD AUTO: 6.8 K/UL (ref 4.8–10.8)

## 2021-06-30 PROCEDURE — 84443 ASSAY THYROID STIM HORMONE: CPT

## 2021-06-30 PROCEDURE — 80061 LIPID PANEL: CPT

## 2021-06-30 PROCEDURE — 82306 VITAMIN D 25 HYDROXY: CPT

## 2021-06-30 PROCEDURE — 83036 HEMOGLOBIN GLYCOSYLATED A1C: CPT

## 2021-06-30 PROCEDURE — 36415 COLL VENOUS BLD VENIPUNCTURE: CPT

## 2021-06-30 PROCEDURE — 85025 COMPLETE CBC W/AUTO DIFF WBC: CPT

## 2021-06-30 PROCEDURE — 80053 COMPREHEN METABOLIC PANEL: CPT

## 2021-07-01 DIAGNOSIS — Z91.89 OTHER SPECIFIED PERSONAL RISK FACTORS, NOT ELSEWHERE CLASSIFIED: ICD-10-CM

## 2021-07-20 ENCOUNTER — HOSPITAL ENCOUNTER (OUTPATIENT)
Facility: MEDICAL CENTER | Age: 51
End: 2021-07-20
Attending: NURSE PRACTITIONER
Payer: COMMERCIAL

## 2021-07-20 ENCOUNTER — OFFICE VISIT (OUTPATIENT)
Dept: MEDICAL GROUP | Facility: LAB | Age: 51
End: 2021-07-20
Payer: COMMERCIAL

## 2021-07-20 VITALS
SYSTOLIC BLOOD PRESSURE: 138 MMHG | HEIGHT: 67 IN | HEART RATE: 66 BPM | DIASTOLIC BLOOD PRESSURE: 78 MMHG | TEMPERATURE: 97.7 F | BODY MASS INDEX: 29.66 KG/M2 | OXYGEN SATURATION: 98 % | WEIGHT: 189 LBS | RESPIRATION RATE: 16 BRPM

## 2021-07-20 DIAGNOSIS — Z12.4 SCREENING FOR MALIGNANT NEOPLASM OF CERVIX: ICD-10-CM

## 2021-07-20 DIAGNOSIS — Z01.419 ENCOUNTER FOR GYNECOLOGICAL EXAMINATION: ICD-10-CM

## 2021-07-20 DIAGNOSIS — E78.49 OTHER HYPERLIPIDEMIA: ICD-10-CM

## 2021-07-20 PROCEDURE — 99396 PREV VISIT EST AGE 40-64: CPT | Performed by: NURSE PRACTITIONER

## 2021-07-20 PROCEDURE — 88175 CYTOPATH C/V AUTO FLUID REDO: CPT

## 2021-07-20 RX ORDER — PHENTERMINE HYDROCHLORIDE 37.5 MG/1
TABLET ORAL
COMMUNITY
Start: 2021-06-22 | End: 2021-07-29

## 2021-07-20 RX ORDER — ATORVASTATIN CALCIUM 40 MG/1
40 TABLET, FILM COATED ORAL DAILY
Qty: 90 TABLET | Refills: 3 | Status: SHIPPED | OUTPATIENT
Start: 2021-07-20 | End: 2021-11-08 | Stop reason: SDUPTHER

## 2021-07-20 ASSESSMENT — FIBROSIS 4 INDEX: FIB4 SCORE: 1.15

## 2021-07-20 NOTE — PROGRESS NOTES
Chief Complaint   Patient presents with   • Annual Exam     PAP       HPI:  Krissy is a 51 y.o.  female who presents for annual exam. Generally the patient is feeling good. She has no complaints or concerns.  She is newly engaged!  Labs done 6/30 - a1c 5.7%,  with HdL 65 and vit D 24, otherwise normal.  Started vit D supplement after seeing labs.  She is taking 20 mg of atorvastatin and awaiting a CT cardiac scoring.  Non-smoker.  Exercising regularly.   Regarding her health maintenance:   Last pap: 2017  Abnormal Pap hx: none  Menopausal since early 40's.  Took hormones for about 2 years and then came off of this.  Denies night sweats, hot flashes or vaginal dryness.  Libido is good.  Last Mammo:4/2021.  Denies breast pain.  Last colonoscopy:2019 - repeat 10 yrs.  Denies GI issues.  Bone density test:N\A   Last Lab: UTD  Last Td:current   Influenza vaccination:current   Pneumococcal vaccination:not applicable   covid vaccines complete  Hx STD''s: no   Regular exercise: yes  UTD with derm.        meds:   Current Outpatient Medications   Medication Sig Dispense Refill   • atorvastatin (LIPITOR) 20 MG Tab Take 1 tablet by mouth every day. 90 tablet 3   • Azelastine HCl 137 MCG/SPRAY Solution 1 spray in each nostril     • montelukast (SINGULAIR) 10 MG Tab 1 tablet     • valacyclovir (VALTREX) 1 GM Tab Take 1 Tab by mouth 2 times a day. 180 Tab 3   • omeprazole (PRILOSEC) 20 MG delayed-release capsule Take 1 Cap by mouth every day. 30 Cap 5   • tizanidine (ZANAFLEX) 4 MG Tab Take 1 Tab by mouth at bedtime as needed (joint pain / muscle tightness). 30 Tab 1   • famotidine (PEPCID) 20 MG Tab Take 1 Tab by mouth 2 times a day. 60 Tab 6   • azelastine (ASTELIN) 137 MCG/SPRAY nasal spray [place] 1 Spray in nose 2 times a day. 30 mL 4   • NON SPECIFIED ResMed AirMini with P10 Setup Pack and Mask     Add Travel Case 1 Each 0   • montelukast (SINGULAIR) 10 MG Tab Take 1 Tab by mouth every day. 30 Tab 11   • epinephrine  (EPIPEN) 0.3 MG/0.3ML (1:1000) injection 0.3 mL by Intramuscular route Once PRN for 1 dose. 1 Each 3     No current facility-administered medications for this visit.       Allergies: No Known Allergies    family:   Family History   Problem Relation Age of Onset   • Cancer Father         prostate   • Hypertension Father    • Arthritis Father    • Prostate cancer Father    • Cancer Sister         melanoma   • Cancer Paternal Aunt         breast   • Diabetes Other    • Sleep Apnea Neg Hx        social hx:   Social History     Socioeconomic History   • Marital status: Single     Spouse name: Not on file   • Number of children: Not on file   • Years of education: Not on file   • Highest education level: Not on file   Occupational History   • Not on file   Tobacco Use   • Smoking status: Never Smoker   • Smokeless tobacco: Never Used   Vaping Use   • Vaping Use: Never used   Substance and Sexual Activity   • Alcohol use: Not Currently     Comment: 4 per week   • Drug use: No   • 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: , 2 children, teacher   Other Topics Concern   • Not on file   Social History Narrative   • Not on file     Social Determinants of Health     Financial Resource Strain:    • Difficulty of Paying Living Expenses:    Food Insecurity:    • Worried About Running Out of Food in the Last Year:    • Ran Out of Food in the Last Year:    Transportation Needs:    • Lack of Transportation (Medical):    • Lack of Transportation (Non-Medical):    Physical Activity:    • Days of Exercise per Week:    • Minutes of Exercise per Session:    Stress:    • Feeling of Stress :    Social Connections:    • Frequency of Communication with Friends and Family:    • Frequency of Social Gatherings with Friends and Family:    • Attends Sabianist Services:    • Active Member of Clubs or Organizations:    • Attends Club or Organization Meetings:    • Marital Status:    Intimate Partner Violence:    • Fear of Current or  "Ex-Partner:    • Emotionally Abused:    • Physically Abused:    • Sexually Abused:        ROS:  No fever, chills, sweats.   No polydipsia, polyuria, temperature intolerance, significant weight changes   No visual changes, blurred vision.  No chest pain, palpitations, peripheral swelling   No chronic cough, shortness of breath, dyspnea with exertion.   No dysphagia, odynophagia, black or bloody stools.   No abdominal pain, nausea, persistent diarrhea, constipation   No dysuria, hematuria, incontinence. Denies nocturia  No rash, pruritis, pigment changes.   No focal weakness, syncope, headache, confusion, persistent numbness.   All other systems are reviewed and negative.    PHYSICAL EXAMINATION:  /78 (BP Location: Left arm, Patient Position: Sitting, BP Cuff Size: Large adult)   Pulse 66   Temp 36.5 °C (97.7 °F)   Resp 16   Ht 1.702 m (5' 7\")   Wt 85.7 kg (189 lb)   SpO2 98%   General appearance:healthy, well developed, well nourished  Psych: alert, no distress, cooperative  Eyes: EOM's normal, pupils equal, round, reactive to light  ENT: Ears: external ears normal to inspection and palpation, TM's clear, Nose/Sinuses: nose shows no deformity, asymmetry, or inflammation  Neck: no asymmetry, masses, or scars, no adenopathy, thyroid normal to palpation  Lungs:chest symmetric with normal A/P diameter, no chest deformities noted, normal respiratory rate and rhythm  Cardiovascular:regular rate and rhythm, S1 normal  Breasts: normal in size and symmetry, skin normal, physiologic fibronodularity  Abdomen: umbilicus normal, no masses palpable, no organomegaly  Musculoskeletal:ROM of all joints is normal, no evidence of joint instability  Lymphatic: None significantly enlarged  Skin: no rash, no edema  Neuro: mental status intact, cranial nerves 2-12 intact  Pelvic: external genitalia normal, cervix normal in appearance, bimanual exam reveals normal uterus, adnexa without masses or tenderness, vaginal mucosa " normal      ASSESSMENT/PLAN:  1.annual physical exam: HCM:  Pap and breast exams done.  BSE technique reviewed and patient encouraged to perform self-exam monthly.   Encourage monthly self breast exam  Encourage daily exercise for at least 30 minutes  Recommend mammogram and DEXA yearly.  Colonoscopy up-to-date.  Bone density at age 54 because of going through menopause around age 39.  Recommend 1500 mg Calcium with 2000 international units daily.  Pap smears every 3 years of today's is normal.  Discussed her A1c and encouraged a diabetic diet with continued diligent exercise.  Because of her LDL at 120 on 20 mg of atorvastatin, encouraged her to increase this to 40 mg.  She is taking concurrent coq10 which she will continue.  She will contact me if she begins to have any problems with myalgias after increasing atorvastatin.  Immunizations are up-to-date.  Follow-up at least yearly.

## 2021-07-21 DIAGNOSIS — Z12.4 SCREENING FOR MALIGNANT NEOPLASM OF CERVIX: ICD-10-CM

## 2021-07-21 LAB — CYTOLOGY REG CYTOL: NORMAL

## 2021-07-29 ENCOUNTER — HOSPITAL ENCOUNTER (OUTPATIENT)
Dept: RADIOLOGY | Facility: MEDICAL CENTER | Age: 51
End: 2021-07-29
Attending: NURSE PRACTITIONER
Payer: COMMERCIAL

## 2021-07-29 DIAGNOSIS — Z91.89 OTHER SPECIFIED PERSONAL RISK FACTORS, NOT ELSEWHERE CLASSIFIED: ICD-10-CM

## 2021-07-29 PROCEDURE — 4410556 CT-CARDIAC SCORING (SELF PAY ONLY)

## 2021-07-29 RX ORDER — PHENTERMINE HYDROCHLORIDE 37.5 MG/1
TABLET ORAL
Qty: 30 TABLET | Refills: 2 | Status: SHIPPED | OUTPATIENT
Start: 2021-07-29 | End: 2021-08-18

## 2021-07-29 NOTE — TELEPHONE ENCOUNTER
Received request via: Pharmacy    Was the patient seen in the last year in this department? Yes  7/20/2021  Does the patient have an active prescription (recently filled or refills available) for medication(s) requested? No

## 2021-07-30 ENCOUNTER — SLEEP CENTER VISIT (OUTPATIENT)
Dept: SLEEP MEDICINE | Facility: MEDICAL CENTER | Age: 51
End: 2021-07-30
Payer: COMMERCIAL

## 2021-07-30 VITALS
DIASTOLIC BLOOD PRESSURE: 66 MMHG | RESPIRATION RATE: 16 BRPM | WEIGHT: 192.3 LBS | SYSTOLIC BLOOD PRESSURE: 108 MMHG | HEART RATE: 64 BPM | HEIGHT: 67 IN | OXYGEN SATURATION: 98 % | BODY MASS INDEX: 30.18 KG/M2

## 2021-07-30 DIAGNOSIS — G47.31 CENTRAL SLEEP APNEA: ICD-10-CM

## 2021-07-30 PROCEDURE — 99213 OFFICE O/P EST LOW 20 MIN: CPT | Performed by: INTERNAL MEDICINE

## 2021-07-30 ASSESSMENT — FIBROSIS 4 INDEX: FIB4 SCORE: 1.15

## 2021-07-30 NOTE — PROGRESS NOTES
Chief Complaint   Patient presents with   • Follow-Up     [facility], last seen 8/19/21 by Dr. Cortes for central sleep apnea   • Other     compliance scanned     Fartun Mittal is a 51 y.o. female presenting for annual evaluation and management of central sleep apnea. PSG fron 12/16 showed severe central sleep apnea, /h.  She is on ASV EPAP: 7 cm of water, pressure support: 15/5 cm of water.  Compliance card shows 100% ASV usage for almost 9 hours nightly.  Her average AHI is 1.9.  She loves her ASV machine and admits she cannot sleep without it.  Wakens rested and denies daytime hypersomnolence. No issues with CPAP mask fit.   Continue to be an avid cyclist. 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      Past Medical History:   Diagnosis Date   • Allergic rhinitis    • Allergy    • Anxiety    • Chickenpox    • Heart burn    • High cholesterol    • Influenza    • Psychiatric problem     depression   • Sleep apnea     cpap   • Snoring     sleep apnea questionairre completed   • Tubal ligation        Social History     Socioeconomic History   • Marital status: Single     Spouse name: Not on file   • Number of children: Not on file   • Years of education: Not on file   • Highest education level: Not on file   Occupational History   • Not on file   Tobacco Use   • Smoking status: Never Smoker   • Smokeless tobacco: Never Used   Vaping Use   • Vaping Use: Never used   Substance and Sexual Activity   • Alcohol use: Not Currently     Comment: 4 per week   • Drug use: No   • 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: , 2 children, teacher   Other Topics Concern   • Not on file   Social History Narrative   • Not on file     Social Determinants of Health     Financial Resource Strain:    • Difficulty of Paying Living Expenses:    Food Insecurity:    • Worried About Running Out of Food in the Last Year:    • Ran Out of Food in the Last Year:    Transportation Needs:    • Lack of Transportation (Medical):    • Lack of  Transportation (Non-Medical):    Physical Activity:    • Days of Exercise per Week:    • Minutes of Exercise per Session:    Stress:    • Feeling of Stress :    Social Connections:    • Frequency of Communication with Friends and Family:    • Frequency of Social Gatherings with Friends and Family:    • Attends Denominational Services:    • Active Member of Clubs or Organizations:    • Attends Club or Organization Meetings:    • Marital Status:    Intimate Partner Violence:    • Fear of Current or Ex-Partner:    • Emotionally Abused:    • Physically Abused:    • Sexually Abused:        Family History   Problem Relation Age of Onset   • Cancer Father         prostate   • Hypertension Father    • Arthritis Father    • Prostate cancer Father    • Cancer Sister         melanoma   • Cancer Paternal Aunt         breast   • Diabetes Other    • Sleep Apnea Neg Hx        Current Outpatient Medications on File Prior to Visit   Medication Sig Dispense Refill   • phentermine (ADIPEX-P) 37.5 MG tablet TAKE ONE TABLET BY MOUTH EVERY MORNING BEFORE BREAKFAST FOR 30 DAYS 30 tablet 2   • atorvastatin (LIPITOR) 40 MG Tab Take 1 tablet by mouth every day. 90 tablet 3   • Azelastine HCl 137 MCG/SPRAY Solution 1 spray in each nostril     • montelukast (SINGULAIR) 10 MG Tab 1 tablet     • valacyclovir (VALTREX) 1 GM Tab Take 1 Tab by mouth 2 times a day. 180 Tab 3   • omeprazole (PRILOSEC) 20 MG delayed-release capsule Take 1 Cap by mouth every day. 30 Cap 5   • tizanidine (ZANAFLEX) 4 MG Tab Take 1 Tab by mouth at bedtime as needed (joint pain / muscle tightness). 30 Tab 1   • famotidine (PEPCID) 20 MG Tab Take 1 Tab by mouth 2 times a day. 60 Tab 6   • NON SPECIFIED ResMed AirMini with P10 Setup Pack and Mask     Add Travel Case 1 Each 0   • epinephrine (EPIPEN) 0.3 MG/0.3ML (1:1000) injection 0.3 mL by Intramuscular route Once PRN for 1 dose. 1 Each 3     No current facility-administered medications on file prior to visit.       Allergies:  "Asa [aspirin], Nsaids, Sulfa drugs, Augmentin, Clindamycin, and Contrast media with iodine [iodine]    ROS:   Constitutional: Denies fevers, chills, night sweats, fatigue or weight loss  Eyes: Denies vision loss, pain, drainage, double vision  Ears, Nose, Throat: Denies earache, difficulty hearing, tinnitus, nasal congestion, hoarseness  Cardiovascular: Denies chest pain, tightness, palpitations, orthopnea or edema  Respiratory: Denies shortness of breath, cough, wheezing, hemoptysis  Sleep: Denies daytime sleepiness, snoring, apneas, insomnia, morning headaches  GI: Denies heartburn, dysphagia, nausea, abdominal pain, diarrhea or constipation  : Denies frequent urination, hematuria, discharge or painful urination  Musculoskeletal: Denies back pain, painful joints, sore muscles  Neurological: Denies weakness or headaches  Skin: No rashes    /66 (BP Location: Right arm, Patient Position: Sitting, BP Cuff Size: Large adult)   Pulse 64   Resp 16   Ht 1.702 m (5' 7\")   Wt 87.2 kg (192 lb 4.8 oz)   SpO2 98%     Physical Exam:  Appearance: Well-nourished, well-developed, in no acute distress  HEENT: Normocephalic, atraumatic, white sclera, PERRLA, masked  Neck: No adenopathy or masses  Respiratory: no intercostal retractions or accessory muscle use  Lungs auscultation: Clear to auscultation bilaterally  Cardiovascular: Regular rate rhythm. No murmurs, rubs or gallops.  No LE edema  Abdomen: soft, nondistended  Gait: Normal  Digits: No clubbing, cyanosis  Motor: No focal deficits  Orientation: Oriented to time, person and place    Diagnosis:  1. Central sleep apnea  DME Mask and Supplies   2. BMI 30.0-30.9,adult         Plan:  The patient shows excellent compliance and response to ASV therapy for treatment of severe central sleep apnea.  She is benefiting from therapy.    Continue ASV at above pressure settings.   CPAP supplies ordered.   Return in about 1 year (around 7/30/2022).      "

## 2021-08-05 ENCOUNTER — OFFICE VISIT (OUTPATIENT)
Dept: URGENT CARE | Facility: CLINIC | Age: 51
End: 2021-08-05
Payer: COMMERCIAL

## 2021-08-05 ENCOUNTER — HOSPITAL ENCOUNTER (OUTPATIENT)
Facility: MEDICAL CENTER | Age: 51
End: 2021-08-05
Attending: NURSE PRACTITIONER
Payer: COMMERCIAL

## 2021-08-05 VITALS
TEMPERATURE: 98.7 F | HEIGHT: 67 IN | SYSTOLIC BLOOD PRESSURE: 128 MMHG | HEART RATE: 94 BPM | BODY MASS INDEX: 28.25 KG/M2 | RESPIRATION RATE: 16 BRPM | DIASTOLIC BLOOD PRESSURE: 88 MMHG | WEIGHT: 180 LBS | OXYGEN SATURATION: 99 %

## 2021-08-05 DIAGNOSIS — J02.9 PHARYNGITIS, UNSPECIFIED ETIOLOGY: ICD-10-CM

## 2021-08-05 PROCEDURE — 87070 CULTURE OTHR SPECIMN AEROBIC: CPT

## 2021-08-05 PROCEDURE — 99214 OFFICE O/P EST MOD 30 MIN: CPT | Performed by: NURSE PRACTITIONER

## 2021-08-05 RX ORDER — AZITHROMYCIN 250 MG/1
TABLET, FILM COATED ORAL
Qty: 6 TABLET | Refills: 0 | Status: SHIPPED
Start: 2021-08-05 | End: 2022-07-27

## 2021-08-05 ASSESSMENT — FIBROSIS 4 INDEX: FIB4 SCORE: 1.15

## 2021-08-05 NOTE — LETTER
August 5, 2021    To Whom It May Concern:         This is confirmation that Fartun Mittal attended her scheduled appointment with Cathey J Hamman, A.P.N. on 8/05/21. She may go back to work on 08/07/2021.         If you have any questions please do not hesitate to call me at the phone number listed below.    Sincerely,         Cathey J Hamman, A.P.N.  [phone]

## 2021-08-06 NOTE — PROGRESS NOTES
Subjective:      Fartun Mittal is a 51 y.o. female who presents with Sore Throat (loss of voice x5 days )    Past Medical History:   Diagnosis Date   • Allergic rhinitis    • Allergy    • Anxiety    • Chickenpox    • Heart burn    • High cholesterol    • Influenza    • Psychiatric problem     depression   • Sleep apnea     cpap   • Snoring     sleep apnea questionairre completed   • Tubal ligation      Social History     Socioeconomic History   • Marital status: Single     Spouse name: Not on file   • Number of children: Not on file   • Years of education: Not on file   • Highest education level: Not on file   Occupational History   • Not on file   Tobacco Use   • Smoking status: Never Smoker   • Smokeless tobacco: Never Used   Vaping Use   • Vaping Use: Never used   Substance and Sexual Activity   • Alcohol use: Not Currently     Comment: 4 per week   • Drug use: No   • 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: , 2 children, teacher   Other Topics Concern   • Not on file   Social History Narrative   • Not on file     Social Determinants of Health     Financial Resource Strain:    • Difficulty of Paying Living Expenses:    Food Insecurity:    • Worried About Running Out of Food in the Last Year:    • Ran Out of Food in the Last Year:    Transportation Needs:    • Lack of Transportation (Medical):    • Lack of Transportation (Non-Medical):    Physical Activity:    • Days of Exercise per Week:    • Minutes of Exercise per Session:    Stress:    • Feeling of Stress :    Social Connections:    • Frequency of Communication with Friends and Family:    • Frequency of Social Gatherings with Friends and Family:    • Attends Rastafarian Services:    • Active Member of Clubs or Organizations:    • Attends Club or Organization Meetings:    • Marital Status:    Intimate Partner Violence:    • Fear of Current or Ex-Partner:    • Emotionally Abused:    • Physically Abused:    • Sexually Abused:      Family History  "  Problem Relation Age of Onset   • Cancer Father         prostate   • Hypertension Father    • Arthritis Father    • Prostate cancer Father    • Cancer Sister         melanoma   • Cancer Paternal Aunt         breast   • Diabetes Other    • Sleep Apnea Neg Hx        Allergies: Asa [aspirin], Nsaids, Sulfa drugs, Augmentin, Clindamycin, and Contrast media with iodine [iodine]    Patient is a 51-year-old female who presents today with complaint of sore throat loss of voice.  Symptoms started over the last week.  Patient states she has pain with swallowing and has felt slightly feverish.  No other upper respiratory symptoms.  Blood pressure is noted to be elevated upon admission to urgent care.  She states no history of hypertension.  Has been taking phentermine for weight loss and is also taking decongestants.  She states no chest pain or shortness of breath.  No headache.          Other  This is a new problem. The current episode started in the past 7 days. The problem occurs constantly. The problem has been unchanged. Nothing aggravates the symptoms. She has tried nothing for the symptoms. The treatment provided no relief.       Review of Systems   HENT:        Sore throat     All other systems reviewed and are negative.         Objective:     BP (!) 178/106   Pulse 94   Temp 37.1 °C (98.7 °F) (Temporal)   Resp 16   Ht 1.702 m (5' 7\")   Wt 81.6 kg (180 lb)   LMP 07/12/2011   SpO2 99%   BMI 28.19 kg/m²      Physical Exam  Vitals reviewed.   Constitutional:       Appearance: Normal appearance.   HENT:      Head: Normocephalic and atraumatic.      Right Ear: Tympanic membrane, ear canal and external ear normal.      Left Ear: Tympanic membrane, ear canal and external ear normal.      Nose: Nose normal.      Mouth/Throat:      Mouth: Mucous membranes are moist.      Pharynx: Posterior oropharyngeal erythema present.      Comments: Oropharynx is red and erythematous  Eyes:      Extraocular Movements: Extraocular " movements intact.      Conjunctiva/sclera: Conjunctivae normal.      Pupils: Pupils are equal, round, and reactive to light.   Cardiovascular:      Rate and Rhythm: Normal rate and regular rhythm.      Heart sounds: Normal heart sounds.   Pulmonary:      Effort: Pulmonary effort is normal. No respiratory distress.      Breath sounds: Normal breath sounds. No stridor. No wheezing, rhonchi or rales.   Chest:      Chest wall: No tenderness.   Musculoskeletal:         General: Normal range of motion.      Cervical back: Normal range of motion and neck supple.   Skin:     General: Skin is warm and dry.      Capillary Refill: Capillary refill takes less than 2 seconds.   Neurological:      Mental Status: She is alert and oriented to person, place, and time.   Psychiatric:         Mood and Affect: Mood normal.         Behavior: Behavior normal.         Thought Content: Thought content normal.         Judgment: Judgment normal.       Blood pressure recheck to the right upper extremity with a large cuff: 128/76          Point-of-care strep: Negative       Assessment/Plan:   Pharyngitis    Throat culture  Zithromax  Warm salt water gargles  Tylenol/Motrin as needed  We will notify upon receiving results of throat culture  Return to urgent care otherwise for any further questions or concerns     There are no diagnoses linked to this encounter.

## 2021-08-08 LAB
BACTERIA SPEC RESP CULT: NORMAL
SIGNIFICANT IND 70042: NORMAL
SITE SITE: NORMAL
SOURCE SOURCE: NORMAL

## 2021-08-18 RX ORDER — PHENTERMINE HYDROCHLORIDE 37.5 MG/1
TABLET ORAL
Qty: 30 TABLET | Refills: 2 | Status: SHIPPED | OUTPATIENT
Start: 2021-08-18 | End: 2021-11-08 | Stop reason: SDUPTHER

## 2021-10-03 ENCOUNTER — OFFICE VISIT (OUTPATIENT)
Dept: URGENT CARE | Facility: CLINIC | Age: 51
End: 2021-10-03
Payer: COMMERCIAL

## 2021-10-03 VITALS
HEIGHT: 67 IN | TEMPERATURE: 98.1 F | SYSTOLIC BLOOD PRESSURE: 146 MMHG | RESPIRATION RATE: 18 BRPM | OXYGEN SATURATION: 98 % | WEIGHT: 191 LBS | HEART RATE: 87 BPM | BODY MASS INDEX: 29.98 KG/M2 | DIASTOLIC BLOOD PRESSURE: 94 MMHG

## 2021-10-03 DIAGNOSIS — H10.30 ACUTE BACTERIAL CONJUNCTIVITIS, UNSPECIFIED LATERALITY: ICD-10-CM

## 2021-10-03 DIAGNOSIS — H57.9 ITCHY, WATERY, AND RED EYE: ICD-10-CM

## 2021-10-03 DIAGNOSIS — Z20.9 INFECTIOUS DISEASE EXPOSURE: ICD-10-CM

## 2021-10-03 DIAGNOSIS — H57.89 EYE DRAINAGE: ICD-10-CM

## 2021-10-03 PROCEDURE — 99213 OFFICE O/P EST LOW 20 MIN: CPT | Performed by: NURSE PRACTITIONER

## 2021-10-03 RX ORDER — POLYMYXIN B SULFATE AND TRIMETHOPRIM 1; 10000 MG/ML; [USP'U]/ML
1 SOLUTION OPHTHALMIC EVERY 4 HOURS
Qty: 10 ML | Refills: 0 | Status: SHIPPED | OUTPATIENT
Start: 2021-10-03 | End: 2021-10-10

## 2021-10-03 ASSESSMENT — ENCOUNTER SYMPTOMS
DOUBLE VISION: 0
PHOTOPHOBIA: 0
NAUSEA: 0
EYE REDNESS: 1
EYE PAIN: 0
FEVER: 0
EYE DISCHARGE: 1
CHILLS: 0
BLURRED VISION: 0

## 2021-10-03 ASSESSMENT — FIBROSIS 4 INDEX: FIB4 SCORE: 1.15

## 2021-10-03 ASSESSMENT — VISUAL ACUITY: OU: 1

## 2021-10-03 NOTE — PROGRESS NOTES
Fartun Mittal is a 51 y.o. female who presents for Eye Problem (drainage, crust, swelling, exposed to pink eye)      HPI This is a new problem. Fartun is a 52 y/o female with c/o eye drainage. Her eyes were crusted shut this morning. 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 Her eyes are itchy and red. She used OTC Visine drops last night so she could sleep. Helped some. Symptoms are worse today. Vision is normal. She wears glasses, not contacts. No other aggravating or alleviating factors.        Review of Systems   Constitutional: Negative for chills and fever.   Eyes: Positive for discharge and redness. Negative for blurred vision, double vision, photophobia and pain.   Gastrointestinal: Negative for nausea.   Endo/Heme/Allergies: Negative for environmental allergies.       Allergies:       Allergies   Allergen Reactions   • Asa [Aspirin] Anaphylaxis   • Nsaids Anaphylaxis   • Sulfa Drugs Anaphylaxis   • Augmentin Anaphylaxis   • Clindamycin Unspecified   • Contrast Media With Iodine [Iodine] Itching       PMSFS Hx:  Past Medical History:   Diagnosis Date   • Allergic rhinitis    • Allergy    • Anxiety    • Chickenpox    • Heart burn    • High cholesterol    • Influenza    • Psychiatric problem     depression   • Sleep apnea     cpap   • Snoring     sleep apnea questionairre completed   • Tubal ligation      Past Surgical History:   Procedure Laterality Date   • GASTROSCOPY-ENDO  12/10/2019    Procedure: GASTROSCOPY-POSSIBLE BIOPSY, DILATION, POLYPECTOMY, CONTROL OF HEMORRHAGE;  Surgeon: Pb Abdi M.D.;  Location: [facility];  Service: Gastroenterology   • BUNIONECTOMY  5/18/2011    Performed by MOLLY GARCIA at [facility]   • CHEILECTOMY  5/18/2011    Performed by MOLLY AGRCIA at [facility]   • BONE SPUR EXCISION  5/18/2011    Performed by MOLLY GARCIA at [facility]   • ESWT  5/18/2011     Performed by MOLLY GARCIA at [facility]   • ABDOMINOPLASTY  2009   • TUBAL COAGULATION LAPAROSCOPIC BILATERAL  2007   • APPENDECTOMY  3/2003   • FOOT ORIF  1988    left   • TONSILLECTOMY  1981     Family History   Problem Relation Age of Onset   • Cancer Father         prostate   • Hypertension Father    • Arthritis Father    • Prostate cancer Father    • Cancer Sister         melanoma   • Cancer Paternal Aunt         breast   • Diabetes Other    • Sleep Apnea Neg Hx      Social History     Tobacco Use   • Smoking status: Never Smoker   • Smokeless tobacco: Never Used   Substance Use Topics   • Alcohol use: Not Currently     Comment: 4 per week       Problems:   Patient Active Problem List   Diagnosis   • Anxiety   • Ocular migraine   • Hyperlipidemia   • Menopause   • GERD (gastroesophageal reflux disease)   • Aspirin-sensitive asthma with nasal polyps   • Chronic sinusitis   • Apnea   • CARI (obstructive sleep apnea)   • Central sleep apnea   • Family history of melanoma   • Gastric polyps   • BMI 28.0-28.9,adult   • HSV-2 infection   • Impaired fasting glucose   • Hypertrophy of nasal turbinates   • Chronic pansinusitis       Medications:   Current Outpatient Medications on File Prior to Visit   Medication Sig Dispense Refill   • azithromycin (ZITHROMAX) 250 MG Tab Take 2 pills by mouth on day one, take 1 pill by mouth on days 2-5 6 tablet 0   • atorvastatin (LIPITOR) 40 MG Tab Take 1 tablet by mouth every day. 90 tablet 3   • Azelastine HCl 137 MCG/SPRAY Solution 1 spray in each nostril     • montelukast (SINGULAIR) 10 MG Tab 1 tablet     • valacyclovir (VALTREX) 1 GM Tab Take 1 Tab by mouth 2 times a day. 180 Tab 3   • omeprazole (PRILOSEC) 20 MG delayed-release capsule Take 1 Cap by mouth every day. 30 Cap 5   • tizanidine (ZANAFLEX) 4 MG Tab Take 1 Tab by mouth at bedtime as needed (joint pain / muscle tightness). 30 Tab 1   • famotidine (PEPCID) 20 MG Tab Take 1 Tab by mouth 2 times a day.  "60 Tab 6   • NON SPECIFIED ResMed AirMini with P10 Setup Pack and Mask     Add Travel Case 1 Each 0   • epinephrine (EPIPEN) 0.3 MG/0.3ML (1:1000) injection 0.3 mL by Intramuscular route Once PRN for 1 dose. 1 Each 3     No current facility-administered medications on file prior to visit.          Objective:     /94 (BP Location: Left arm, Patient Position: Sitting, BP Cuff Size: Adult)   Pulse 87   Temp 36.7 °C (98.1 °F) (Temporal)   Resp 18   Ht 1.702 m (5' 7\")   Wt 86.6 kg (191 lb)   LMP 07/12/2011   SpO2 98%   BMI 29.91 kg/m²     Physical Exam  Nursing note reviewed.   Constitutional:       General: She is not in acute distress.     Appearance: She is well-developed.   HENT:      Head: Normocephalic.   Eyes:      General: Lids are normal. Vision grossly intact.         Right eye: Discharge present.         Left eye: Discharge present.     Extraocular Movements: Extraocular movements intact.      Conjunctiva/sclera: Conjunctivae normal.      Pupils: Pupils are equal, round, and reactive to light.   Cardiovascular:      Rate and Rhythm: Normal rate.      Pulses: Normal pulses.   Pulmonary:      Effort: Pulmonary effort is normal.   Musculoskeletal:      Cervical back: Full passive range of motion without pain and normal range of motion.   Skin:     General: Skin is warm and dry.      Capillary Refill: Capillary refill takes less than 2 seconds.   Neurological:      Mental Status: She is alert and oriented to person, place, and time.   Psychiatric:         Mood and Affect: Mood normal.         Speech: Speech normal.         Behavior: Behavior normal.         Thought Content: Thought content normal.         Assessment /Associated Orders:      1. Acute bacterial conjunctivitis, unspecified laterality  polymixin-trimethoprim (POLYTRIM) 22859-6.1 UNIT/ML-% Solution   2. Infectious disease exposure     3. Eye drainage     4. Itchy, watery, and red eye         Medical Decision Making:    Pt is clinically " stable at today's acute urgent care visit.  No acute distress noted. Appropriate for outpatient management at this time.   Acute problem today with uncertain prognosis.   Use dilute baby shampoo solution to gently clean the right eyelid margin daily. Continue warm compresses 3 or 4 times a day  Educated in proper administration of medication(s) ordered today including safety, possible SE, risks, benefits, rationale and alternatives to therapy.   Warm compresses prn   Liquid tears prn   OTC antihistamine of choice. Follow manufactures dosing and safety guidelines.   Educated in infection control practices.     Advised to follow-up with the primary care provider for recheck, reevaluation, and consideration of further management if necessary.   Discussed management options (risks,benefits, and alternatives to treatment). Expressed understanding and the treatment plan was agreed upon. Questions were encouraged and answered   Return to urgent care prn if new or worsening sx or if there is no improvement in condition prn.    Educated in Red flags and indications to immediately call 911 or present to the Emergency Department.     I personally reviewed prior external notes and test results pertinent to today's visit.  I have independently reviewed and interpreted all diagnostics ordered during this urgent care acute visit.   Time spent evaluating this patient was at least 20 minutes and includes preparing for visit, counseling/education, exam and evaluation, obtaining history, independent interpretation, ordering lab/test/procedures,medication management and documentation.Time does not include separately billable procedures noted .

## 2021-10-08 ENCOUNTER — APPOINTMENT (OUTPATIENT)
Dept: MEDICAL GROUP | Facility: LAB | Age: 51
End: 2021-10-08
Payer: COMMERCIAL

## 2021-10-11 ENCOUNTER — TELEPHONE (OUTPATIENT)
Dept: MEDICAL GROUP | Facility: LAB | Age: 51
End: 2021-10-11

## 2021-10-11 DIAGNOSIS — M79.606 PAIN OF LOWER EXTREMITY, UNSPECIFIED LATERALITY: ICD-10-CM

## 2021-10-11 RX ORDER — TRIAMCINOLONE ACETONIDE 40 MG/ML
40 INJECTION, SUSPENSION INTRA-ARTICULAR; INTRAMUSCULAR ONCE
Status: COMPLETED | OUTPATIENT
Start: 2021-10-12 | End: 2021-10-12
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
## 2021-10-11 NOTE — TELEPHONE ENCOUNTER
----- Message from Fartun Mittal sent at 10/11/2021  3:45 PM PDT -----  Regarding: Kenalog shot and Physical Therapy referal for leg  Tomorrow, Tuesday, at 8:00 would be good.  Thank you!

## 2021-10-12 ENCOUNTER — NON-PROVIDER VISIT (OUTPATIENT)
Dept: MEDICAL GROUP | Facility: LAB | Age: 51
End: 2021-10-12
Payer: COMMERCIAL

## 2021-10-12 PROCEDURE — 96372 THER/PROPH/DIAG INJ SC/IM: CPT | Performed by: NURSE PRACTITIONER

## 2021-10-12 RX ADMIN — TRIAMCINOLONE ACETONIDE 40 MG: 40 INJECTION, SUSPENSION INTRA-ARTICULAR; INTRAMUSCULAR at 08:26

## 2021-10-12 NOTE — PROGRESS NOTES
Fartun Mittal is a 51 y.o. female here for a non-provider visit for Kenalog  injection.    Reason for injection: Due   Order in MAR?: Yes  Patient supplied?:No  Minimum interval has been met for this injection (per MAR order): Yes    Patient tolerated injection and no adverse effects were observed or reported: Yes    # of Administrations remaining in MAR: 0

## 2021-11-15 DIAGNOSIS — J32.4 CHRONIC PANSINUSITIS: ICD-10-CM

## 2021-11-15 RX ORDER — MONTELUKAST SODIUM 10 MG/1
10 TABLET ORAL DAILY
Qty: 30 TABLET | Refills: 6 | Status: SHIPPED | OUTPATIENT
Start: 2021-11-15 | End: 2024-01-31

## 2021-11-22 DIAGNOSIS — J30.89 OTHER ALLERGIC RHINITIS: ICD-10-CM

## 2021-11-22 DIAGNOSIS — M25.50 MULTIPLE JOINT PAIN: ICD-10-CM

## 2021-11-22 RX ORDER — FLUTICASONE PROPIONATE 50 MCG
SPRAY, SUSPENSION (ML) NASAL
Qty: 16 G | Refills: 4 | Status: SHIPPED | OUTPATIENT
Start: 2021-11-22

## 2021-11-22 RX ORDER — AZELASTINE HYDROCHLORIDE 137 UG/1
SPRAY, METERED NASAL
Qty: 30 ML | Refills: 1 | Status: SHIPPED | OUTPATIENT
Start: 2021-11-22 | End: 2021-12-16

## 2021-11-22 RX ORDER — TIZANIDINE 4 MG/1
4 TABLET ORAL NIGHTLY PRN
Qty: 30 TABLET | Refills: 1 | Status: SHIPPED | OUTPATIENT
Start: 2021-11-22 | End: 2022-01-12

## 2021-11-22 NOTE — TELEPHONE ENCOUNTER
----- Message from Fartun Mittal sent at 11/22/2021  6:55 AM PST -----  Regarding: Refill Medication new Pharmacy- [facility]  Good morning,  Can I get a refill of Azelastine, Tizanidine and Flonase?  Thank you and Happy Thanksgiving!  Krissy

## 2021-11-22 NOTE — TELEPHONE ENCOUNTER
Received request via: Patient    Was the patient seen in the last year in this department? Yes  7/20/21  Does the patient have an active prescription (recently filled or refills available) for medication(s) requested? No

## 2021-12-16 RX ORDER — AZELASTINE 1 MG/ML
SPRAY, METERED NASAL
Qty: 30 ML | Refills: 1 | Status: SHIPPED | OUTPATIENT
Start: 2021-12-16 | End: 2022-01-12

## 2021-12-18 ENCOUNTER — PATIENT MESSAGE (OUTPATIENT)
Dept: MEDICAL GROUP | Facility: LAB | Age: 51
End: 2021-12-18

## 2021-12-19 SDOH — HEALTH STABILITY: PHYSICAL HEALTH: ON AVERAGE, HOW MANY DAYS PER WEEK DO YOU ENGAGE IN MODERATE TO STRENUOUS EXERCISE (LIKE A BRISK WALK)?: 4 DAYS

## 2021-12-19 SDOH — ECONOMIC STABILITY: TRANSPORTATION INSECURITY
IN THE PAST 12 MONTHS, HAS THE LACK OF TRANSPORTATION KEPT YOU FROM MEDICAL APPOINTMENTS OR FROM GETTING MEDICATIONS?: NO

## 2021-12-19 SDOH — ECONOMIC STABILITY: INCOME INSECURITY: HOW HARD IS IT FOR YOU TO PAY FOR THE VERY BASICS LIKE FOOD, HOUSING, MEDICAL CARE, AND HEATING?: NOT HARD AT ALL

## 2021-12-19 SDOH — ECONOMIC STABILITY: INCOME INSECURITY: IN THE LAST 12 MONTHS, WAS THERE A TIME WHEN YOU WERE NOT ABLE TO PAY THE MORTGAGE OR RENT ON TIME?: NO

## 2021-12-19 SDOH — ECONOMIC STABILITY: FOOD INSECURITY: WITHIN THE PAST 12 MONTHS, YOU WORRIED THAT YOUR FOOD WOULD RUN OUT BEFORE YOU GOT MONEY TO BUY MORE.: NEVER TRUE

## 2021-12-19 SDOH — ECONOMIC STABILITY: FOOD INSECURITY: WITHIN THE PAST 12 MONTHS, THE FOOD YOU BOUGHT JUST DIDN'T LAST AND YOU DIDN'T HAVE MONEY TO GET MORE.: NEVER TRUE

## 2021-12-19 SDOH — ECONOMIC STABILITY: HOUSING INSECURITY
IN THE LAST 12 MONTHS, WAS THERE A TIME WHEN YOU DID NOT HAVE A STEADY PLACE TO SLEEP OR SLEPT IN A SHELTER (INCLUDING NOW)?: NO

## 2021-12-19 SDOH — ECONOMIC STABILITY: HOUSING INSECURITY: IN THE LAST 12 MONTHS, HOW MANY PLACES HAVE YOU LIVED?: 1

## 2021-12-19 SDOH — ECONOMIC STABILITY: TRANSPORTATION INSECURITY
IN THE PAST 12 MONTHS, HAS LACK OF TRANSPORTATION KEPT YOU FROM MEETINGS, WORK, OR FROM GETTING THINGS NEEDED FOR DAILY LIVING?: NO

## 2021-12-19 SDOH — HEALTH STABILITY: PHYSICAL HEALTH: ON AVERAGE, HOW MANY MINUTES DO YOU ENGAGE IN EXERCISE AT THIS LEVEL?: 60 MIN

## 2021-12-19 ASSESSMENT — SOCIAL DETERMINANTS OF HEALTH (SDOH)
IN A TYPICAL WEEK, HOW MANY TIMES DO YOU TALK ON THE PHONE WITH FAMILY, FRIENDS, OR NEIGHBORS?: MORE THAN THREE TIMES A WEEK
HOW OFTEN DO YOU ATTEND CHURCH OR RELIGIOUS SERVICES?: PATIENT DECLINED
HOW OFTEN DO YOU GET TOGETHER WITH FRIENDS OR RELATIVES?: MORE THAN THREE TIMES A WEEK
ARE YOU MARRIED, WIDOWED, DIVORCED, SEPARATED, NEVER MARRIED, OR LIVING WITH A PARTNER?: LIVING WITH PARTNER
HOW OFTEN DO YOU ATTENT MEETINGS OF THE CLUB OR ORGANIZATION YOU BELONG TO?: MORE THAN 4 TIMES PER YEAR
DO YOU BELONG TO ANY CLUBS OR ORGANIZATIONS SUCH AS CHURCH GROUPS UNIONS, FRATERNAL OR ATHLETIC GROUPS, OR SCHOOL GROUPS?: YES

## 2021-12-19 ASSESSMENT — LIFESTYLE VARIABLES
HOW MANY STANDARD DRINKS CONTAINING ALCOHOL DO YOU HAVE ON A TYPICAL DAY: 1 OR 2
HOW OFTEN DO YOU HAVE A DRINK CONTAINING ALCOHOL: 2-3 TIMES A WEEK
HOW OFTEN DO YOU HAVE SIX OR MORE DRINKS ON ONE OCCASION: LESS THAN MONTHLY

## 2021-12-20 ENCOUNTER — TELEPHONE (OUTPATIENT)
Dept: MEDICAL GROUP | Facility: LAB | Age: 51
End: 2021-12-20

## 2021-12-20 RX ORDER — PHENTERMINE HYDROCHLORIDE 37.5 MG/1
TABLET ORAL
Qty: 30 TABLET | Refills: 0 | Status: SHIPPED | OUTPATIENT
Start: 2021-12-20 | End: 2022-01-19

## 2021-12-20 SDOH — HEALTH STABILITY: PHYSICAL HEALTH: ON AVERAGE, HOW MANY MINUTES DO YOU ENGAGE IN EXERCISE AT THIS LEVEL?: 60 MIN

## 2021-12-20 SDOH — HEALTH STABILITY: MENTAL HEALTH
STRESS IS WHEN SOMEONE FEELS TENSE, NERVOUS, ANXIOUS, OR CAN'T SLEEP AT NIGHT BECAUSE THEIR MIND IS TROUBLED. HOW STRESSED ARE YOU?: ONLY A LITTLE

## 2021-12-20 SDOH — ECONOMIC STABILITY: HOUSING INSECURITY: IN THE LAST 12 MONTHS, HOW MANY PLACES HAVE YOU LIVED?: 1

## 2021-12-20 SDOH — HEALTH STABILITY: PHYSICAL HEALTH: ON AVERAGE, HOW MANY DAYS PER WEEK DO YOU ENGAGE IN MODERATE TO STRENUOUS EXERCISE (LIKE A BRISK WALK)?: 4 DAYS

## 2021-12-20 SDOH — ECONOMIC STABILITY: TRANSPORTATION INSECURITY
IN THE PAST 12 MONTHS, HAS LACK OF RELIABLE TRANSPORTATION KEPT YOU FROM MEDICAL APPOINTMENTS, MEETINGS, WORK OR FROM GETTING THINGS NEEDED FOR DAILY LIVING?: NO

## 2021-12-20 ASSESSMENT — SOCIAL DETERMINANTS OF HEALTH (SDOH)
HOW OFTEN DO YOU GET TOGETHER WITH FRIENDS OR RELATIVES?: MORE THAN THREE TIMES A WEEK
DO YOU BELONG TO ANY CLUBS OR ORGANIZATIONS SUCH AS CHURCH GROUPS UNIONS, FRATERNAL OR ATHLETIC GROUPS, OR SCHOOL GROUPS?: YES
HOW OFTEN DO YOU HAVE A DRINK CONTAINING ALCOHOL: 2-3 TIMES A WEEK
HOW OFTEN DO YOU ATTEND CHURCH OR RELIGIOUS SERVICES?: PATIENT DECLINED
HOW HARD IS IT FOR YOU TO PAY FOR THE VERY BASICS LIKE FOOD, HOUSING, MEDICAL CARE, AND HEATING?: NOT HARD AT ALL
HOW OFTEN DO YOU HAVE SIX OR MORE DRINKS ON ONE OCCASION: LESS THAN MONTHLY
HOW MANY DRINKS CONTAINING ALCOHOL DO YOU HAVE ON A TYPICAL DAY WHEN YOU ARE DRINKING: 1 OR 2
HOW OFTEN DO YOU ATTENT MEETINGS OF THE CLUB OR ORGANIZATION YOU BELONG TO?: MORE THAN 4 TIMES PER YEAR
WITHIN THE PAST 12 MONTHS, YOU WORRIED THAT YOUR FOOD WOULD RUN OUT BEFORE YOU GOT THE MONEY TO BUY MORE: NEVER TRUE
IN A TYPICAL WEEK, HOW MANY TIMES DO YOU TALK ON THE PHONE WITH FAMILY, FRIENDS, OR NEIGHBORS?: MORE THAN THREE TIMES A WEEK
ARE YOU MARRIED, WIDOWED, DIVORCED, SEPARATED, NEVER MARRIED, OR LIVING WITH A PARTNER?: LIVING WITH PARTNER

## 2021-12-20 NOTE — TELEPHONE ENCOUNTER
----- Message from JUDD Merritt sent at 12/20/2021 10:35 AM PST -----  Regarding: FW: Heartburn      ----- Message -----  From: Andrei Jaramillo R.N.  Sent: 12/20/2021   9:35 AM PST  To: JUDD Merritt  Subject: FW: Heartburn                                      ----- Message -----  From: Fartun Mittal  Sent: 12/20/2021   9:26 AM PST  To: Ella Farooq Ma  Subject: Heartburn                                        Thank you Eveline.  I have begun taking Nexium and now have no heart burn. I am frustrated that the gasterenteroligist Dr. Veras (age 69) asked me to stop taking Prilosec to avoid non cancerous polyps in my stomach in exchange for an irritated esophogus.  The doctor, a fill in doctor for my upper Friday Dr. Donovan an new doctor in her 30's, said the polyps in my stomach from Prilosec are benign and would not become cancerous.  It seems that would be the better alternative to an irritated esophogus.  I scheduled an appointment with you for Wednesday morning to discuss my heart burn options.  I appreciate all your help.  Krissy

## 2021-12-20 NOTE — PATIENT COMMUNICATION
Should she take Prilosec or Nexium/ she said that she started Nexium this last Thursday. She said that Prilosec only works. She is afraid because she got polyps from this.please advise

## 2021-12-20 NOTE — TELEPHONE ENCOUNTER
"Okay to put in for a refill of phentermine.    In terms of watery eyes, this is usually a result of either allergies or ironically dry eyes.  She could try generic Claritin for a week and if it does not help, \"dry eye\" drops.  "

## 2021-12-21 ENCOUNTER — OFFICE VISIT (OUTPATIENT)
Dept: MEDICAL GROUP | Facility: LAB | Age: 51
End: 2021-12-21
Payer: COMMERCIAL

## 2021-12-21 VITALS
OXYGEN SATURATION: 98 % | HEIGHT: 67 IN | BODY MASS INDEX: 30.45 KG/M2 | WEIGHT: 194 LBS | DIASTOLIC BLOOD PRESSURE: 82 MMHG | HEART RATE: 80 BPM | TEMPERATURE: 97.2 F | SYSTOLIC BLOOD PRESSURE: 136 MMHG

## 2021-12-21 DIAGNOSIS — K31.7 GASTRIC POLYPS: ICD-10-CM

## 2021-12-21 DIAGNOSIS — K44.9 HIATAL HERNIA: ICD-10-CM

## 2021-12-21 DIAGNOSIS — K21.00 GASTROESOPHAGEAL REFLUX DISEASE WITH ESOPHAGITIS WITHOUT HEMORRHAGE: ICD-10-CM

## 2021-12-21 PROCEDURE — 99213 OFFICE O/P EST LOW 20 MIN: CPT | Performed by: NURSE PRACTITIONER

## 2021-12-21 RX ORDER — OMEPRAZOLE 40 MG/1
40 CAPSULE, DELAYED RELEASE ORAL DAILY
Qty: 90 CAPSULE | Refills: 2 | Status: SHIPPED | OUTPATIENT
Start: 2021-12-21 | End: 2023-06-29

## 2021-12-21 ASSESSMENT — FIBROSIS 4 INDEX: FIB4 SCORE: 1.15

## 2021-12-21 NOTE — PROGRESS NOTES
"Chief Complaint   Patient presents with   • Gastrophageal Reflux       HPI  Krissy is a 51-year-old established female here to follow-up on recent endoscopy as the results mailed to her, concerned her.  Biopsies were taken and pathology has not returned.  She has chronic GERD and a hiatal hernia.  She had an endoscopy in 2019 and was taken off PPI therapy because of esophageal polyps.  Was taking tums / pepcid x the past year but had a lot of breakthrough heart burn and was pretty miserable.  Just had repeat endoscopy and this showed esophageal changes /possible lazo's esophagus, 1 cm hital hernia and a few gastric polyps.  Does not follow up with GI until 2/10/2022.  Pathology pending regarding esophageal biopsies.  She is on Nexium right now which works great for her.  Prilosec also works perfectly for her to alleviate all symptoms.  She plans on losing about 30 pounds over the next year.  She exercises regularly.  She avoids all ibuprofen type products and does not smoke.    Past medical, surgical, family, and social history is reviewed and updated in Epic chart by me today.   Medications and allergies reviewed and updated in Epic chart by me today.     ROS:   As documented in history of present illness above    Exam:  /82 (BP Location: Left arm, Patient Position: Sitting, BP Cuff Size: Large adult)   Pulse 80   Temp 36.2 °C (97.2 °F)   Ht 1.702 m (5' 7\")   Wt 88 kg (194 lb)   SpO2 98%   Constitutional: Alert, no distress, plus 3 vital signs  Skin:  Warm, dry, no rashes invisible areas  Eye: Equal, round and reactive, conjunctiva clear  Respiratory: Unlabored respiration, lungs clear to auscultation, no wheezes, no rhonchi  Cardiovascular: Normal rate  Psych: Alert, pleasant, well-groomed, normal affect    Assessment / Plan / Medical Decision making:   \"  1. Gastric polyps  omeprazole (PRILOSEC) 40 MG delayed-release capsule   2. Gastroesophageal reflux disease with esophagitis without hemorrhage  " "omeprazole (PRILOSEC) 40 MG delayed-release capsule   3. Hiatal hernia - 1 cm     \"  Patient is pretty miserable on an H2 blocker and Tums.  Restarted PPI therapy until she is able to meet with GI in early February.  Agree with the patient that symptoms would improve if she were to lose 30 pounds.  We discussed avoiding spicy/fried foods, large meals, NSAIDs and lying down after eating.  Discussed the risk versus benefit of chronic PPI therapy.  Fortunately omeprazole works well for her.  Nexium required a prior authorization.  She does plan on keeping her appointment with GI in February.  "

## 2021-12-23 ENCOUNTER — TELEPHONE (OUTPATIENT)
Dept: SLEEP MEDICINE | Facility: MEDICAL CENTER | Age: 51
End: 2021-12-23

## 2021-12-23 DIAGNOSIS — G47.31 CENTRAL SLEEP APNEA: ICD-10-CM

## 2021-12-23 NOTE — TELEPHONE ENCOUNTER
Patient called stating it will be 5 years 1/17/22 that she has had her current C-pap. She wants a new order for AIRSENSE 11, with heated tubing, and pressure setting sent to [facility].     Patient last seen with Dr Cortes 7/30/21. If new order is approved and faxed to [facility] Patient will need to be notified.

## 2021-12-26 NOTE — TELEPHONE ENCOUNTER
Order for ASV is signed. It is possible the patient may need an office visit for this to be approved. She does need at least a 4 month f/u for first compliance should the order be approved by insurance. Please fax to [facility].     Thank you  JING Mills.

## 2021-12-27 NOTE — TELEPHONE ENCOUNTER
Order faxed to [facility] for new equipment as it been 5 years now. Faxed confirmation scanned into the account. Patient aware.

## 2021-12-27 NOTE — TELEPHONE ENCOUNTER
Patient was mailed a confirmation from the new order sent to [facility] for Resmed Airsense 11, patient was asked to make appointment to follow up 3 to 90 day after new equipment set up.

## 2022-01-12 DIAGNOSIS — M25.50 MULTIPLE JOINT PAIN: ICD-10-CM

## 2022-01-12 RX ORDER — TIZANIDINE 4 MG/1
4 TABLET ORAL NIGHTLY PRN
Qty: 30 TABLET | Refills: 1 | Status: SHIPPED | OUTPATIENT
Start: 2022-01-12 | End: 2022-01-24

## 2022-01-12 RX ORDER — AZELASTINE 1 MG/ML
SPRAY, METERED NASAL
Qty: 30 ML | Refills: 1 | Status: SHIPPED | OUTPATIENT
Start: 2022-01-12 | End: 2022-01-24

## 2022-01-12 NOTE — TELEPHONE ENCOUNTER
Received request viapharm    Was the patient seen in the last year in this department? Yes  12/21/21  Does the patient have an active prescription (recently filled or refills available) for medication(s) requested? No

## 2022-01-18 NOTE — TELEPHONE ENCOUNTER
Received request via: Pharmacy    Was the patient seen in the last year in this department? Yes  LOV 12/21/2021  Does the patient have an active prescription (recently filled or refills available) for medication(s) requested? No

## 2022-01-19 RX ORDER — PHENTERMINE HYDROCHLORIDE 37.5 MG/1
TABLET ORAL
Qty: 30 TABLET | Refills: 1 | Status: SHIPPED | OUTPATIENT
Start: 2022-01-19 | End: 2022-04-19 | Stop reason: SDUPTHER

## 2022-01-21 DIAGNOSIS — M25.50 MULTIPLE JOINT PAIN: ICD-10-CM

## 2022-01-24 RX ORDER — TIZANIDINE 4 MG/1
4 TABLET ORAL NIGHTLY PRN
Qty: 90 TABLET | Refills: 1 | Status: SHIPPED | OUTPATIENT
Start: 2022-01-24 | End: 2022-07-13 | Stop reason: SDUPTHER

## 2022-01-24 RX ORDER — AZELASTINE 1 MG/ML
SPRAY, METERED NASAL
Qty: 30 ML | Refills: 1 | Status: SHIPPED | OUTPATIENT
Start: 2022-01-24 | End: 2022-04-07 | Stop reason: SDUPTHER

## 2022-04-07 RX ORDER — AZELASTINE 1 MG/ML
SPRAY, METERED NASAL
Qty: 1 ML | Refills: 15 | Status: SHIPPED | OUTPATIENT
Start: 2022-04-07 | End: 2022-05-07

## 2022-04-19 RX ORDER — PHENTERMINE HYDROCHLORIDE 37.5 MG/1
TABLET ORAL
Qty: 30 TABLET | Refills: 1 | Status: SHIPPED | OUTPATIENT
Start: 2022-04-19 | End: 2022-08-31 | Stop reason: SDUPTHER

## 2022-04-19 NOTE — TELEPHONE ENCOUNTER
----- Message from Fartun Mittal sent at 4/19/2022 11:12 AM PDT -----  Regarding: Leg and Diet Pill  Ernie Mosquera,  I hope you had a great Easter!  I believe my leg is getting better.  The physical therapy has helped.  But maybe I shoud check.  The pain moves from my hip to my upper knee.    Can I also get a refill of my diet pill?  Thank you for all your help!  Krissy

## 2022-05-05 ENCOUNTER — HOSPITAL ENCOUNTER (OUTPATIENT)
Dept: RADIOLOGY | Facility: MEDICAL CENTER | Age: 52
End: 2022-05-05
Attending: NURSE PRACTITIONER
Payer: COMMERCIAL

## 2022-05-05 DIAGNOSIS — Z12.31 VISIT FOR SCREENING MAMMOGRAM: ICD-10-CM

## 2022-05-05 PROCEDURE — 77063 BREAST TOMOSYNTHESIS BI: CPT

## 2022-06-24 NOTE — PROGRESS NOTES
"Subjective:      Fartun Mittal is a 50 y.o. female who presents with Cough (headache, fatigue and congestion, Covid Exposure x 3 days)            3 days mostly dry cough, headache, and nasal congestion.  Fatigue is relatively severe.  No shortness of breath.  No fever.  No loss of taste or smell.  Concerned about possible COVID-19 exposure.  Minimal relief with OTC medication.  No other aggravating or alleviating factors.      Review of Systems   Constitutional: Negative for weight loss.   Eyes: Negative for discharge and redness.   Gastrointestinal: Negative for diarrhea, nausea and vomiting.   Musculoskeletal: Positive for myalgias. Negative for joint pain.   Skin: Negative for itching and rash.          Objective:     /84 (BP Location: Right arm, Patient Position: Sitting, BP Cuff Size: Adult)   Pulse 84   Temp 36.4 °C (97.5 °F) (Temporal)   Resp 20   Ht 1.702 m (5' 7\")   Wt 86.2 kg (190 lb)   LMP 07/12/2011   SpO2 95%   BMI 29.76 kg/m²      Physical Exam  Constitutional:       General: She is not in acute distress.     Appearance: She is well-developed.   HENT:      Head: Normocephalic and atraumatic.      Nose: Congestion present. No rhinorrhea.      Mouth/Throat:      Mouth: Mucous membranes are moist.      Pharynx: No posterior oropharyngeal erythema.   Eyes:      Conjunctiva/sclera: Conjunctivae normal.   Cardiovascular:      Rate and Rhythm: Normal rate and regular rhythm.      Heart sounds: Normal heart sounds. No murmur.   Pulmonary:      Effort: Pulmonary effort is normal.      Breath sounds: Normal breath sounds. No wheezing.   Skin:     General: Skin is warm and dry.      Findings: No rash.   Neurological:      Mental Status: She is alert and oriented to person, place, and time.                 Assessment/Plan:        1. Cough    - COVID/SARS COV-2 PCR; Future    2. 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, Z96.649]   Post-operative diagnosis: Same   Procedure: Total hip arthoplasty Revision (Right)   Surgeon: Riccardo Dietz MD     Assistant(s): Luiz Cruz PA-C   Anesthesia: Spinal Anesthesia   Estimated blood loss: 300 ml   Drains: None   Specimens: None       Findings: See full dictated operative note for details   Complications: None       Comments: See dictated operative report for full details     Indications:    The patient underwent total hip arthroplasty without complications in February 2022 and had done well postoperatively initially but has experienced repeated subluxation and ultimately jenna dislocation dislocation of the right hip the past 10 days requiring reduction in the emergency room x2.  Based on his history he was deemed to have a failed right total hip arthroplasty and decision was made to proceed with revision total hip arthroplasty.  The risk benefits alternatives expected outcomes of this procedure were discussed with the patient and the decision was made to proceed.         Procedure and Findings:    After being informed of the risks, benefits, and alternatives to the procedure, the patient desired to proceed. Brought to the main operating suite where she was placed under spinal anesthetic. She was positioned in an Innomed hip castillo. The patient s Right lower extremity was prepped and draped in a manner appropriate for the procedure after a timeout verification step was complete.    Patient  Future    3. Fatigue, unspecified type     - COVID/SARS COV-2 PCR; Future    Self isolate per CDC protocol.  Follow-up COVID-19 testing.   received 2 grams of intravenous Ancef. Luiz Cruz PA-C was present for the entire length of the case for the purposes of proper patient positioning, surgical exposure, and patient safety.    A  posterior approach to the hip was taken. IT band was divided. Gluteus fibers divided and the Charnley retractor was placed.  Utilizing sharp dissection and electrocautery. Leg  Length and offset was then determined using a [employer] device and a pin placed in the innominate this was set aside for later reference.  The joint capsule was then entered.  Aerobic and anaerobic cultures x2 were obtained.  Fluid was bloody but no evidence of purulence was appreciated.  Hip joint was noted to be congruent but upon testing could easily dislocate with internal rotation.  Based on the appearance of the acetabular component and the ease of dislocation decision was made to proceed with revision of the acetabular component increase anteversion and stability and proceed with an MDM type femoral head to provide greater stability and resistance to dislocation.    Capsular tissues were excised sharply and with all the cautery allowing mobilization of the proximal femur and retraction anterior to the acetabulum anterior and posterior retractors were placed additional soft tissue resection was carried out circumferentially exposing the rim of the acetabular component combination of explant and curved osteotomes were used to free the acetabular component with minimal bone loss noted.    Sequential reaming from 53 to 59 mm was carried out.  The trial 58 mm cup was secured.  A Trident 2 HA PSL 5 hole cup was then secured with appropriate anteversion and coverage mental fixation with grooves x3 was completed and a trial MDM liner was secured.   MDM trial components were then positioned ultimately the +12mm MDM implant reproduced leg length and provided excellent stability. X-ray was then obtained demonstrating acceptable position of the  revision acetabular component.    Final MDM liner was then secured in a cleaned acetabular component reductions again demonstrated excellent stability and restoration of leg length.  The final 12mm MDM implant was assembled and secured to the Guerrero taper checked for stability and the hip was reduced.    Additional irrigation and dilute Betadine solution were lavaged through the joint followed by IT band repair using a tap #1 strata fix suture subcu was closed with 2-0 Vicryl skin was closed with staples and an Aquacel dressing was placed.  Patient Toller procedure well there are no complications patient blood loss 300 ml.  Patient was returned to the PAR in stable condition                Riccardo Dietz MD    Date: 6/24/2022 Time: 6:46 PM      CONFIDENTIALITY NOTICE This message and any included attachments are from [facility] and are intended only for the addressee. The information contained in this message is confidential and may constitute inside or non-public information under international, federal, or state securities laws. Unauthorized forwarding, printing, copying, distribution, or use of such information is strictly prohibited and may be unlawful. If you are not the addressee, please promptly delete this message and notify the sender of the delivery error by e-mail.

## 2022-07-13 ENCOUNTER — PATIENT MESSAGE (OUTPATIENT)
Dept: MEDICAL GROUP | Facility: LAB | Age: 52
End: 2022-07-13
Payer: COMMERCIAL

## 2022-07-13 DIAGNOSIS — R73.03 PREDIABETES: ICD-10-CM

## 2022-07-13 DIAGNOSIS — M25.50 MULTIPLE JOINT PAIN: ICD-10-CM

## 2022-07-13 DIAGNOSIS — Z00.00 PREVENTATIVE HEALTH CARE: ICD-10-CM

## 2022-07-13 RX ORDER — TIZANIDINE 4 MG/1
4 TABLET ORAL NIGHTLY PRN
Qty: 90 TABLET | Refills: 1 | Status: SHIPPED | OUTPATIENT
Start: 2022-07-13 | End: 2024-01-15 | Stop reason: SDUPTHER

## 2022-07-13 NOTE — TELEPHONE ENCOUNTER
Received request via: Patient    Was the patient seen in the last year in this department? Yes  12/21/21  Does the patient have an active prescription (recently filled or refills available) for medication(s) requested? No

## 2022-07-19 ENCOUNTER — HOSPITAL ENCOUNTER (OUTPATIENT)
Dept: LAB | Facility: MEDICAL CENTER | Age: 52
End: 2022-07-19
Attending: NURSE PRACTITIONER
Payer: COMMERCIAL

## 2022-07-19 DIAGNOSIS — Z00.00 PREVENTATIVE HEALTH CARE: ICD-10-CM

## 2022-07-19 DIAGNOSIS — R73.03 PREDIABETES: ICD-10-CM

## 2022-07-19 LAB
ALBUMIN SERPL BCP-MCNC: 4.7 G/DL (ref 3.2–4.9)
ALBUMIN/GLOB SERPL: 1.7 G/DL
ALP SERPL-CCNC: 81 U/L (ref 30–99)
ALT SERPL-CCNC: 49 U/L (ref 2–50)
ANION GAP SERPL CALC-SCNC: 13 MMOL/L (ref 7–16)
AST SERPL-CCNC: 32 U/L (ref 12–45)
BASOPHILS # BLD AUTO: 0.9 % (ref 0–1.8)
BASOPHILS # BLD: 0.09 K/UL (ref 0–0.12)
BILIRUB SERPL-MCNC: 0.8 MG/DL (ref 0.1–1.5)
BUN SERPL-MCNC: 11 MG/DL (ref 8–22)
CALCIUM SERPL-MCNC: 10 MG/DL (ref 8.5–10.5)
CHLORIDE SERPL-SCNC: 103 MMOL/L (ref 96–112)
CHOLEST SERPL-MCNC: 241 MG/DL (ref 100–199)
CO2 SERPL-SCNC: 24 MMOL/L (ref 20–33)
CREAT SERPL-MCNC: 0.77 MG/DL (ref 0.5–1.4)
CREAT UR-MCNC: 16.14 MG/DL
EOSINOPHIL # BLD AUTO: 0.4 K/UL (ref 0–0.51)
EOSINOPHIL NFR BLD: 4.1 % (ref 0–6.9)
ERYTHROCYTE [DISTWIDTH] IN BLOOD BY AUTOMATED COUNT: 47.8 FL (ref 35.9–50)
EST. AVERAGE GLUCOSE BLD GHB EST-MCNC: 120 MG/DL
FASTING STATUS PATIENT QL REPORTED: NORMAL
GFR SERPLBLD CREATININE-BSD FMLA CKD-EPI: 92 ML/MIN/1.73 M 2
GLOBULIN SER CALC-MCNC: 2.7 G/DL (ref 1.9–3.5)
GLUCOSE SERPL-MCNC: 94 MG/DL (ref 65–99)
HBA1C MFR BLD: 5.8 % (ref 4–5.6)
HCT VFR BLD AUTO: 47.3 % (ref 37–47)
HDLC SERPL-MCNC: 67 MG/DL
HGB BLD-MCNC: 15.7 G/DL (ref 12–16)
IMM GRANULOCYTES # BLD AUTO: 0.05 K/UL (ref 0–0.11)
IMM GRANULOCYTES NFR BLD AUTO: 0.5 % (ref 0–0.9)
LDLC SERPL CALC-MCNC: 129 MG/DL
LYMPHOCYTES # BLD AUTO: 2.49 K/UL (ref 1–4.8)
LYMPHOCYTES NFR BLD: 25.3 % (ref 22–41)
MCH RBC QN AUTO: 32.5 PG (ref 27–33)
MCHC RBC AUTO-ENTMCNC: 33.2 G/DL (ref 33.6–35)
MCV RBC AUTO: 97.9 FL (ref 81.4–97.8)
MICROALBUMIN UR-MCNC: <1.2 MG/DL
MICROALBUMIN/CREAT UR: NORMAL MG/G (ref 0–30)
MONOCYTES # BLD AUTO: 0.76 K/UL (ref 0–0.85)
MONOCYTES NFR BLD AUTO: 7.7 % (ref 0–13.4)
NEUTROPHILS # BLD AUTO: 6.05 K/UL (ref 2–7.15)
NEUTROPHILS NFR BLD: 61.5 % (ref 44–72)
NRBC # BLD AUTO: 0 K/UL
NRBC BLD-RTO: 0 /100 WBC
PLATELET # BLD AUTO: 280 K/UL (ref 164–446)
PMV BLD AUTO: 10.9 FL (ref 9–12.9)
POTASSIUM SERPL-SCNC: 4.1 MMOL/L (ref 3.6–5.5)
PROT SERPL-MCNC: 7.4 G/DL (ref 6–8.2)
RBC # BLD AUTO: 4.83 M/UL (ref 4.2–5.4)
SODIUM SERPL-SCNC: 140 MMOL/L (ref 135–145)
TRIGL SERPL-MCNC: 224 MG/DL (ref 0–149)
TSH SERPL DL<=0.005 MIU/L-ACNC: 2.62 UIU/ML (ref 0.38–5.33)
WBC # BLD AUTO: 9.8 K/UL (ref 4.8–10.8)

## 2022-07-19 PROCEDURE — 82570 ASSAY OF URINE CREATININE: CPT

## 2022-07-19 PROCEDURE — 36415 COLL VENOUS BLD VENIPUNCTURE: CPT

## 2022-07-19 PROCEDURE — 82043 UR ALBUMIN QUANTITATIVE: CPT

## 2022-07-19 PROCEDURE — 84443 ASSAY THYROID STIM HORMONE: CPT

## 2022-07-19 PROCEDURE — 80061 LIPID PANEL: CPT

## 2022-07-19 PROCEDURE — 83036 HEMOGLOBIN GLYCOSYLATED A1C: CPT

## 2022-07-19 PROCEDURE — 85025 COMPLETE CBC W/AUTO DIFF WBC: CPT

## 2022-07-19 PROCEDURE — 80053 COMPREHEN METABOLIC PANEL: CPT

## 2022-07-27 ENCOUNTER — OFFICE VISIT (OUTPATIENT)
Dept: MEDICAL GROUP | Facility: LAB | Age: 52
End: 2022-07-27
Payer: COMMERCIAL

## 2022-07-27 VITALS
TEMPERATURE: 97.4 F | HEART RATE: 70 BPM | OXYGEN SATURATION: 96 % | HEIGHT: 67 IN | SYSTOLIC BLOOD PRESSURE: 130 MMHG | BODY MASS INDEX: 30.13 KG/M2 | DIASTOLIC BLOOD PRESSURE: 80 MMHG | RESPIRATION RATE: 12 BRPM | WEIGHT: 192 LBS

## 2022-07-27 DIAGNOSIS — E78.49 OTHER HYPERLIPIDEMIA: ICD-10-CM

## 2022-07-27 DIAGNOSIS — Z23 NEED FOR PNEUMOCOCCAL VACCINATION: ICD-10-CM

## 2022-07-27 DIAGNOSIS — Z00.00 WELL ADULT EXAM: ICD-10-CM

## 2022-07-27 PROCEDURE — 99396 PREV VISIT EST AGE 40-64: CPT | Performed by: NURSE PRACTITIONER

## 2022-07-27 RX ORDER — ROSUVASTATIN CALCIUM 10 MG/1
10 TABLET, COATED ORAL EVERY EVENING
Qty: 90 TABLET | Refills: 3 | Status: SHIPPED | OUTPATIENT
Start: 2022-07-27 | End: 2023-07-07

## 2022-07-27 ASSESSMENT — PATIENT HEALTH QUESTIONNAIRE - PHQ9: CLINICAL INTERPRETATION OF PHQ2 SCORE: 0

## 2022-07-27 ASSESSMENT — FIBROSIS 4 INDEX: FIB4 SCORE: 0.87

## 2022-07-27 NOTE — PROGRESS NOTES
CC  annual    HPI:  Krissy is a 52 y.o.  female who presents for annual exam.   Seeing Dr. Butler - ENT - impacted sinuses - she was placed on doxycycline which caused GI upset but she finished it and doesn't feel better.  Sees Dr. Butler tomorrow.   Pap UTD.  Menopause, took HRT for a few years, now off.   Mammogram 5/2022  Colonoscopy 2029.  She is only able to take 20 mg of atorvastatin because of muscle aches with 40 mg and LDL is up to 129.  CT cardiac score 87 in July 2021    meds:     Current Outpatient Medications:   •  rosuvastatin, 10 mg, Oral, Q EVENING  •  tizanidine, 4 mg, Oral, HS PRN  •  omeprazole, 40 mg, Oral, DAILY  •  fluticasone, USE 1 SPRAYS IN EACH NOSTRIL DAILY-GENERIC FOR FLONASE (60 days)  •  montelukast, 10 mg, Oral, DAILY  •  valacyclovir, 1,000 mg, Oral, BID  •  NON SPECIFIED, ResMed AirMini with P10 Setup Pack and Mask   Add Travel Case  •  epinephrine, 0.3 mg, Intramuscular, Once PRN      Allergies: No Known Allergies    family:   Family History   Problem Relation Age of Onset   • Cancer Father         prostate   • Hypertension Father    • Arthritis Father    • Prostate cancer Father    • Cancer Sister         melanoma   • Cancer Paternal Aunt         breast   • Diabetes Other    • Sleep Apnea Neg Hx        social hx:   Social History     Socioeconomic History   • Marital status: Single     Spouse name: Not on file   • Number of children: Not on file   • Years of education: Not on file   • Highest education level: Master's degree (e.g., MA, MS, Ninfa, MEd, MSW, LAURA)   Occupational History   • Not on file   Tobacco Use   • Smoking status: Never Smoker   • Smokeless tobacco: Never Used   Vaping Use   • Vaping Use: Never used   Substance and Sexual Activity   • Alcohol use: Not Currently     Comment: 4 per week   • Drug use: No   • 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: , 2 children, teacher   Other Topics Concern   • Not on file   Social History Narrative   • Not on file     Social  "Determinants of Health     Financial Resource Strain: Low Risk    • Difficulty of Paying Living Expenses: Not hard at all   Food Insecurity: No Food Insecurity   • Worried About Running Out of Food in the Last Year: Never true   • Ran Out of Food in the Last Year: Never true   Transportation Needs: No Transportation Needs   • Lack of Transportation (Medical): No   • Lack of Transportation (Non-Medical): No   Physical Activity: Sufficiently Active   • Days of Exercise per Week: 4 days   • Minutes of Exercise per Session: 60 min   Stress: No Stress Concern Present   • Feeling of Stress : Only a little   Social Connections: Unknown   • Frequency of Communication with Friends and Family: More than three times a week   • Frequency of Social Gatherings with Friends and Family: More than three times a week   • Attends Jain Services: Patient refused   • Active Member of Clubs or Organizations: Yes   • Attends Club or Organization Meetings: More than 4 times per year   • Marital Status: Living with partner   Intimate Partner Violence: Not on file   Housing Stability: Low Risk    • Unable to Pay for Housing in the Last Year: No   • Number of Places Lived in the Last Year: 1   • Unstable Housing in the Last Year: No     PHYSICAL EXAMINATION:  /80 (BP Location: Right arm, Patient Position: Sitting, BP Cuff Size: Adult)   Pulse 70   Temp 36.3 °C (97.4 °F)   Resp 12   Ht 1.702 m (5' 7\")   Wt 87.1 kg (192 lb)   SpO2 96%   General appearance:healthy, well developed, well nourished  Psych: alert, no distress, cooperative  Eyes: EOM's normal, pupils equal, round, reactive to light  ENT: Ears: external ears normal to inspection and palpation, TM's clear, Nose/Sinuses: nose shows no deformity, asymmetry, or inflammation  Neck: no asymmetry, masses, or scars, no adenopathy, thyroid normal to palpation  Lungs:chest symmetric with normal A/P diameter, no chest deformities noted, normal respiratory rate and " rhythm  Cardiovascular:regular rate and rhythm, S1 normal  Abdomen: umbilicus normal, no masses palpable, no organomegaly  Musculoskeletal:ROM of all joints is normal, no evidence of joint instability  Lymphatic: None significantly enlarged  Skin: no rash, no edema  Neuro: mental status intact, cranial nerves 2-12 intact      ASSESSMENT/PLAN:  1.annual physical exam: HCM:    Gyn care, mammo, colonoscopy, vaccines all UTD.   Reviewed lab work in depth.  Muscle aches with atorvastatin.  Changed to rosuvastatin and pt will let me know how she is tolerating this in the next month. Recheck lipids in 3 months.    She will f/u with Dr. Butler tomorrow regarding sinus issues.   Encouraged continued efforts at exercise, high-fiber diet, cutting back on alcohol.  We will follow-up with her in 3 months and her lipid panel returns.

## 2022-08-31 NOTE — TELEPHONE ENCOUNTER
Received request via: Patient    Was the patient seen in the last year in this department? Yes  7/27/22  Does the patient have an active prescription (recently filled or refills available) for medication(s) requested? No

## 2022-09-01 RX ORDER — PHENTERMINE HYDROCHLORIDE 37.5 MG/1
TABLET ORAL
Qty: 30 TABLET | Refills: 1 | Status: SHIPPED | OUTPATIENT
Start: 2022-09-01 | End: 2022-10-12 | Stop reason: SDUPTHER

## 2022-10-12 RX ORDER — PHENTERMINE HYDROCHLORIDE 37.5 MG/1
TABLET ORAL
Qty: 30 TABLET | Refills: 1 | Status: SHIPPED | OUTPATIENT
Start: 2022-10-12 | End: 2022-11-11

## 2022-10-19 ENCOUNTER — RX ONLY (OUTPATIENT)
Age: 52
Setting detail: RX ONLY
End: 2022-10-19

## 2022-11-17 ENCOUNTER — HOSPITAL ENCOUNTER (OUTPATIENT)
Dept: LAB | Facility: MEDICAL CENTER | Age: 52
End: 2022-11-17
Attending: NURSE PRACTITIONER
Payer: COMMERCIAL

## 2022-11-17 DIAGNOSIS — E78.49 OTHER HYPERLIPIDEMIA: ICD-10-CM

## 2022-11-17 LAB
CHOLEST SERPL-MCNC: 224 MG/DL (ref 100–199)
HDLC SERPL-MCNC: 74 MG/DL
LDLC SERPL CALC-MCNC: 128 MG/DL
TRIGL SERPL-MCNC: 111 MG/DL (ref 0–149)

## 2022-11-17 PROCEDURE — 80061 LIPID PANEL: CPT

## 2022-11-17 PROCEDURE — 36415 COLL VENOUS BLD VENIPUNCTURE: CPT

## 2022-12-04 ENCOUNTER — PATIENT MESSAGE (OUTPATIENT)
Dept: MEDICAL GROUP | Facility: LAB | Age: 52
End: 2022-12-04
Payer: COMMERCIAL

## 2022-12-08 ENCOUNTER — TELEPHONE (OUTPATIENT)
Dept: MEDICAL GROUP | Facility: LAB | Age: 52
End: 2022-12-08

## 2022-12-08 RX ORDER — SEMAGLUTIDE 1.34 MG/ML
0.25 INJECTION, SOLUTION SUBCUTANEOUS
Qty: 1.5 ML | Refills: 2 | Status: SHIPPED | OUTPATIENT
Start: 2022-12-08 | End: 2023-06-29

## 2022-12-09 NOTE — TELEPHONE ENCOUNTER
DOCUMENTATION OF PAR STATUS:    1. Name of Medication & Dose: Ozempic (0.25 or 0.5 MG/DOSE) 2MG/1.5ML pen-injectors     2. Name of Prescription Coverage Company & phone #: [employer]     3. Date Prior Auth Submitted: 12/8/22    4. What information was given to obtain insurance decision? prediabetes    5. Prior Auth Status? Pending    6. Patient Notified: no    (Key: [id number]

## 2023-01-09 ENCOUNTER — TELEPHONE (OUTPATIENT)
Dept: SLEEP MEDICINE | Facility: MEDICAL CENTER | Age: 53
End: 2023-01-09
Payer: COMMERCIAL

## 2023-01-10 ENCOUNTER — APPOINTMENT (OUTPATIENT)
Dept: SLEEP MEDICINE | Facility: MEDICAL CENTER | Age: 53
End: 2023-01-10
Payer: COMMERCIAL

## 2023-01-10 ENCOUNTER — OFFICE VISIT (OUTPATIENT)
Dept: SLEEP MEDICINE | Facility: MEDICAL CENTER | Age: 53
End: 2023-01-10
Payer: COMMERCIAL

## 2023-01-10 VITALS
WEIGHT: 200 LBS | SYSTOLIC BLOOD PRESSURE: 134 MMHG | HEIGHT: 67 IN | HEART RATE: 76 BPM | BODY MASS INDEX: 31.39 KG/M2 | OXYGEN SATURATION: 96 % | DIASTOLIC BLOOD PRESSURE: 72 MMHG | RESPIRATION RATE: 16 BRPM

## 2023-01-10 DIAGNOSIS — J34.3 HYPERTROPHY OF NASAL TURBINATES: ICD-10-CM

## 2023-01-10 DIAGNOSIS — G47.31 CENTRAL SLEEP APNEA: ICD-10-CM

## 2023-01-10 PROCEDURE — 99213 OFFICE O/P EST LOW 20 MIN: CPT | Performed by: NURSE PRACTITIONER

## 2023-01-10 RX ORDER — AZELASTINE 1 MG/ML
SPRAY, METERED NASAL
COMMUNITY
End: 2024-02-14

## 2023-01-10 RX ORDER — PHENTERMINE HYDROCHLORIDE 37.5 MG/1
1 TABLET ORAL DAILY
COMMUNITY
End: 2023-06-29

## 2023-01-10 ASSESSMENT — PATIENT HEALTH QUESTIONNAIRE - PHQ9: CLINICAL INTERPRETATION OF PHQ2 SCORE: 0

## 2023-01-10 ASSESSMENT — FIBROSIS 4 INDEX: FIB4 SCORE: 0.87

## 2023-01-10 NOTE — PROGRESS NOTES
Chief Complaint   Patient presents with    Follow-Up     CSA // last seen 7/30/2021        HPI:      Mrs. Fuentes is a 53 y/o female patient who is in today for CSA f/u. PMH includes migraine, HLD, GERD, chronic sinusitis, nasal turbinate hypertrophy, central sleep apnea, chronic pansinusitis, never smoker, tonsillectomy, anxiety.    Patient received a new ResMed autoASV machine on 6/8/2022.  First compliance report from 6/8/22-7/7/22 was downloaded and reviewed with the patient which showed ASV EPAP 7 cmH2O, PS min/max 5/15 cmH2O, 100% compliance, 9 hrs 10 min use, AHI of 1.7. Compliance report from 12/11/22-1/9/22 showed ASV EPAP 7 cmH2O, PS min/max 5/15 cmH2O, 90% compliance, 8 hours 47 minutes use, AHI 2.4.  She is tolerating the pressure and mask well. She goes to bed at 10 pm and wakes up at 6 am. She denies morning headache, but reports intermittent snoring.  Patient follows up with ENT Dr. Madrid and did recently undergo what sounds like a turbinoplasty with balloon plasty.  Patient reports that she is sleeping much better and has been more rested since being on ASV therapy.  She stays very active by going to the gym 3 times a week for an hour and a half where she performs cardio and lifts weights.  She also goes snowshoeing, skiing and cycling.  Patient is currently using phentermine 37.5 mg daily which is prescribed by PCP to help with about 20 pounds of weight loss.    Sleep Study History:   PSG titration study from 1/3/17 indicated an excellent response to servo adaptive BiPAP ventilation in a patient with previously identified severe sleep apnea hypopnea including a prominent component of central apnea resistant to CPAP and BiPAP therapy. BiPAP was adjusted across a pressure range of 11-16/6-8 cm water with persistence of hypopnea and central apnea events. Overall there were 100 episodes of central apnea, 4 obstructive apneas and 115 episodes of hypopnea. Therefore servo adaptive BiPAP ventilation was  initiated with a minimum expiratory pressure range of 8-10 cm water and pressure support. In the final pressure stage, the apnea hypopnea index was 1.8 events per hour and the lowest arterial oxygen saturation was 90% on room air.     PSG split night study from 12/3/16 indicated very severe sleep apnea hypopnea with an apnea hypopnea index of 120.6 events per hour and a predominance of central apnea episodes suggesting primary central sleep apnea or complex sleep apnea.  Central apnea accounted for 57.4% during diagnostic portion of study.  Nocturnal hypoxemia with a lowest arterial oxygen saturation of 78% on room air. There is a good, but perhaps incomplete, response to CPAP therapy.    ROS:    Constitutional: Denies fevers, Denies weight changes  Eyes: Denies changes in vision, no eye pain  Ears/Nose/Throat/Mouth: Denies nasal congestion or sore throat   Cardiovascular: Denies chest pain or palpitations   Respiratory: Denies shortness of breath , Denies cough  Gastrointestinal/Hepatic: Denies abdominal pain, nausea, vomiting,   Skin/Breast: Denies rash,   Neurological: Denies headache, confusion,   Psychiatric: denies mood disorder   Sleep: + intermittent snoring       Past Medical History:   Diagnosis Date    Allergic rhinitis     Allergy     Anxiety     Chickenpox     Heart burn     High cholesterol     Influenza     Psychiatric problem     depression    Sleep apnea     cpap    Snoring     sleep apnea questionairre completed    Tubal ligation        Past Surgical History:   Procedure Laterality Date    GASTROSCOPY-ENDO  12/10/2019    Procedure: GASTROSCOPY-POSSIBLE BIOPSY, DILATION, POLYPECTOMY, CONTROL OF HEMORRHAGE;  Surgeon: Pb Abdi M.D.;  Location: [facility];  Service: Gastroenterology    BUNIONECTOMY  5/18/2011    Performed by MOLLY GARCIA at [facility]    CHEILECTOMY  5/18/2011    Performed by MOLLY GARCIA at [facility]    BONE SPUR  EXCISION  5/18/2011    Performed by MOLLY GARCIA at SUR[facility]ERY [facility]    ESWT  5/18/2011    Performed by MOLLY GARCIA at [facility]ERY [facility]    ABDOMINOPLASTY  2009    TUBAL COAGULATION LAPAROSCOPIC BILATERAL  2007    APPENDECTOMY  3/2003    ORIF, FOOT  1988    left    TONSILLECTOMY  1981       Family History   Problem Relation Age of Onset    Cancer Father         prostate    Hypertension Father     Arthritis Father     Prostate cancer Father     Cancer Sister         melanoma    Cancer Paternal Aunt         breast    Diabetes Other     Sleep Apnea Neg Hx        Social History     Socioeconomic History    Marital status:      Spouse name: Not on file    Number of children: Not on file    Years of education: Not on file    Highest education level: Master's degree (e.g., MA, MS, Ninfa, MEd, MSW, LAURA)   Occupational History    Not on file   Tobacco Use    Smoking status: Never    Smokeless tobacco: Never   Vaping Use    Vaping Use: Never used   Substance and Sexual Activity    Alcohol use: Not Currently     Comment: 4 per week    Drug use: No    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: , 2 children, teacher   Other Topics Concern    Not on file   Social History Narrative    Not on file     Social Determinants of Health     Financial Resource Strain: Not on file   Food Insecurity: Not on file   Transportation Needs: Not on file   Physical Activity: Not on file   Stress: Not on file   Social Connections: Not on file   Intimate Partner Violence: Not on file   Housing Stability: Not on file       Allergies as of 01/10/2023 - Reviewed 01/10/2023   Allergen Reaction Noted    Asa [aspirin] Anaphylaxis 08/19/2009    Nsaids Anaphylaxis 03/23/2010    Sulfa drugs Anaphylaxis 07/07/2010    Augmentin Anaphylaxis 03/13/2019    Clindamycin Unspecified 04/07/2021    Contrast media with iodine [iodine] Itching 11/17/2014        Vitals:  Vitals:    01/10/23 1451   BP: 134/72   Pulse: 76   Resp: 16    SpO2: 96%       Current medications as of today   Current Outpatient Medications   Medication Sig Dispense Refill    Semaglutide,0.25 or 0.5MG/DOS, (OZEMPIC, 0.25 OR 0.5 MG/DOSE,) 2 MG/1.5ML Solution Pen-injector Inject 0.25 mg under the skin every 7 days. 1.5 mL 2    rosuvastatin (CRESTOR) 10 MG Tab Take 1 Tablet by mouth every evening. 90 Tablet 3    tizanidine (ZANAFLEX) 4 MG Tab Take 1 Tablet by mouth at bedtime as needed (joint pain / muscle tightness). 90 Tablet 1    omeprazole (PRILOSEC) 40 MG delayed-release capsule Take 1 Capsule by mouth every day. 90 Capsule 2    fluticasone (FLONASE) 50 MCG/ACT nasal spray USE 1 SPRAYS IN EACH NOSTRIL DAILY-GENERIC FOR FLONASE (60 days) 16 g 4    montelukast (SINGULAIR) 10 MG Tab Take 1 Tablet by mouth every day. 30 Tablet 6    valacyclovir (VALTREX) 1 GM Tab Take 1 Tab by mouth 2 times a day. 180 Tab 3    NON SPECIFIED ResMed AirMini with P10 Setup Pack and Mask     Add Travel Case 1 Each 0    epinephrine (EPIPEN) 0.3 MG/0.3ML (1:1000) injection 0.3 mL by Intramuscular route Once PRN for 1 dose. 1 Each 3    azelastine (ASTELIN) 137 MCG/SPRAY nasal spray azelastine 137 mcg (0.1 %) nasal spray aerosol      phentermine (ADIPEX-P) 37.5 MG tablet Take 1 Tablet by mouth every day.       No current facility-administered medications for this visit.         Physical Exam: Limited by COVID-19 precautions.  Appearance: Well developed, well nourished, no acute distress  Eyes: PERRL, EOM intact, sclera white, conjunctiva moist  Ears: no lesions or deformities  Hearing: grossly intact  Nose: no lesions or deformities  Respiratory effort: no intercostal retractions or use of accessory muscles  Extremities: no cyanosis or edema  Abdomen: soft   Gait and Station: normal  Digits and nails: no clubbing, cyanosis, petechiae or nodes.  Cranial nerves: grossly intact  Skin: no visible rashes, lesions or ulcers noted  Orientation: Oriented to time, person and place  Mood and affect: mood  and affect appropriate, normal interaction with examiner  Judgement: Intact    Assessment:  1. Central sleep apnea  DME Mask and Supplies    EC-ECHOCARDIOGRAM COMPLETE W/O CONT      2. Hypertrophy of nasal turbinates        3. BMI 31.0-31.9,adult  Patient identified as having weight management issue.  Appropriate orders and counseling given.            Plan  Discussed the cardiovascular and neuropsychiatric risks of untreated CSA; including but not limited to: HTN, DM, MI, ASCVD, CVA, CHF, traffic accidents.     1.  First compliance report from 6/8/22-7/7/22 was downloaded and reviewed with the patient which showed ASV EPAP 7 cmH2O, PS min/max 5/15 cmH2O, 100% compliance, 9 hrs 10 min use, AHI of 1.7. Compliance report from 12/11/22-1/9/22 showed ASV EPAP 7 cmH2O, PS min/max 5/15 cmH2O, 90% compliance, 8 hours 47 minutes use, AHI 2.4.  Patient is compliant and benefiting from ASV therapy for management of CSA. Advised patient to continue to use the ASV every night for more than four hours for optimal health benefit.    *DME order ([facility]) for mask (SW Airfit N30i mask or MOC) and supplies was placed today. Continue to clean mask and supplies weekly with soap and water, and change supplies per insurance guidelines.     *Order Echo to ensure left ventricular ejection fraction is greater than 45% patient is to remain on ASV therapy.  We will assist patient with results when available.    2. Continue to f/u with ENT, Dr. Madrid.   3. Continue to stay active.  Patient is currently utilizing phentermine 37.5 mg daily to help with weight loss which is prescribed by PCP.  If your BMI is 25-29.9 you are overweight. If your BMI is 30 or greater you are obese. To lose weight eat less, move more, or both. Any diet that reduces caloric intake can help with weight loss. Extra weight may reduce your lifespan. Avoid dramatic unsustainable dietary changes that result in the yo-yo effect (down then back up.)  Usually small modifications  in diet and exercise are easier to stick with.  4. Sleep hygiene discussed. Recommend keeping a set sleep/wake schedule. Logging enough hours of sleep. Limiting/Avoiding naps. No caffeine after noon and no heavy meals in the evening.   5. Follow up with appropriate healthcare providers for all other medical problems.  6. F/u in 1 year for CSA, sooner if needed.       JING Mills.      This dictation was created using voice recognition software. The accuracy of the dictation is limited to the abilities of the software. I expect there may be some errors of grammar and possibly content.

## 2023-01-10 NOTE — TELEPHONE ENCOUNTER
SLEEP - ESTABLISHED PT CHART PREP COMPLETED ON 01/09/23     SCHEDULED FOLLOW UP 01/10/23    Last Office Visit 07/30/2021 With Dr. Cortes    1st Compliance N/A    Hx of Sleep Studies: PSG fron 12/16   Titration- 01/2017    DME:[facility]    Settings: ASV 7/15/5    Wireless Data? NO    Compliance in Media? NO

## 2023-01-30 ENCOUNTER — APPOINTMENT (OUTPATIENT)
Dept: SLEEP MEDICINE | Facility: MEDICAL CENTER | Age: 53
End: 2023-01-30
Payer: COMMERCIAL

## 2023-03-07 ENCOUNTER — ANCILLARY PROCEDURE (OUTPATIENT)
Dept: CARDIOLOGY | Facility: MEDICAL CENTER | Age: 53
End: 2023-03-07
Attending: NURSE PRACTITIONER
Payer: COMMERCIAL

## 2023-03-07 DIAGNOSIS — G47.31 CENTRAL SLEEP APNEA: ICD-10-CM

## 2023-03-07 LAB
LV EJECT FRACT  99904: 65
LV EJECT FRACT MOD 2C 99903: 55.96
LV EJECT FRACT MOD 4C 99902: 65.87
LV EJECT FRACT MOD BP 99901: 62.12

## 2023-03-07 PROCEDURE — 93306 TTE W/DOPPLER COMPLETE: CPT

## 2023-03-07 PROCEDURE — 93306 TTE W/DOPPLER COMPLETE: CPT | Mod: 26 | Performed by: INTERNAL MEDICINE

## 2023-04-13 ENCOUNTER — TELEPHONE (OUTPATIENT)
Dept: MEDICAL GROUP | Facility: LAB | Age: 53
End: 2023-04-13
Payer: COMMERCIAL

## 2023-04-13 DIAGNOSIS — M79.606 PAIN OF LOWER EXTREMITY, UNSPECIFIED LATERALITY: ICD-10-CM

## 2023-04-13 NOTE — TELEPHONE ENCOUNTER
Signed. Thanks! (If you can copy / paste what the patient is asking for the referral for into the message - that would be awesome so that I can include a diagnosis without flipping back to message) - appreciate you!

## 2023-05-16 ENCOUNTER — HOSPITAL ENCOUNTER (OUTPATIENT)
Dept: RADIOLOGY | Facility: MEDICAL CENTER | Age: 53
End: 2023-05-16
Attending: NURSE PRACTITIONER
Payer: COMMERCIAL

## 2023-05-16 DIAGNOSIS — Z12.31 VISIT FOR SCREENING MAMMOGRAM: ICD-10-CM

## 2023-05-16 PROCEDURE — 77063 BREAST TOMOSYNTHESIS BI: CPT

## 2023-06-29 ENCOUNTER — OFFICE VISIT (OUTPATIENT)
Dept: MEDICAL GROUP | Facility: LAB | Age: 53
End: 2023-06-29
Payer: COMMERCIAL

## 2023-06-29 VITALS
SYSTOLIC BLOOD PRESSURE: 118 MMHG | OXYGEN SATURATION: 97 % | HEART RATE: 66 BPM | WEIGHT: 200 LBS | DIASTOLIC BLOOD PRESSURE: 80 MMHG | TEMPERATURE: 96.4 F | RESPIRATION RATE: 12 BRPM | HEIGHT: 67 IN | BODY MASS INDEX: 31.39 KG/M2

## 2023-06-29 DIAGNOSIS — G62.9 NEUROPATHY: ICD-10-CM

## 2023-06-29 DIAGNOSIS — Z23 NEED FOR HEPATITIS B VACCINATION: ICD-10-CM

## 2023-06-29 DIAGNOSIS — Z00.00 WELL ADULT EXAM: ICD-10-CM

## 2023-06-29 PROCEDURE — 90471 IMMUNIZATION ADMIN: CPT | Performed by: NURSE PRACTITIONER

## 2023-06-29 PROCEDURE — 99396 PREV VISIT EST AGE 40-64: CPT | Mod: 25 | Performed by: NURSE PRACTITIONER

## 2023-06-29 PROCEDURE — 3079F DIAST BP 80-89 MM HG: CPT | Performed by: NURSE PRACTITIONER

## 2023-06-29 PROCEDURE — 90746 HEPB VACCINE 3 DOSE ADULT IM: CPT | Performed by: NURSE PRACTITIONER

## 2023-06-29 PROCEDURE — 3074F SYST BP LT 130 MM HG: CPT | Performed by: NURSE PRACTITIONER

## 2023-06-29 RX ORDER — BUDESONIDE 0.5 MG/2ML
INHALANT ORAL
COMMUNITY
End: 2023-06-29

## 2023-06-29 RX ORDER — EPINEPHRINE 0.3 MG/.3ML
INJECTION SUBCUTANEOUS
Qty: 1 EACH | Refills: 4 | Status: SHIPPED | OUTPATIENT
Start: 2023-06-29

## 2023-06-29 ASSESSMENT — FIBROSIS 4 INDEX: FIB4 SCORE: 0.88

## 2023-06-29 NOTE — PROGRESS NOTES
Chief Complaint   Patient presents with    Requesting Labs       HPI:  Krissy is a 53 y.o. est female who presents for annual exam. Feeling very well.   Recovering from left foot / ankle surgery.   Had toe / ankle surgery with Dr. Ponce 2 weeks ago - went well.  In a boot.    Also newly dx with neuropathy.  EMG showed neuropathy at 25 % approx in left leg from injury 4 yrs ago.  Staying physically active and trying to maintain muscle strength.    Otherwise denies any complaints or concerns.  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  Does not smoke.  Is .  Has 2 grown children.  Enjoys cycling.    meds:   Current Outpatient Medications   Medication Sig Dispense Refill    EPINEPHrine (EPIPEN) 0.3 MG/0.3ML Solution Auto-injector solution for injection Inject 0.3 mL into the thigh one time for 1 dose 1 Each 4    azelastine (ASTELIN) 137 MCG/SPRAY nasal spray azelastine 137 mcg (0.1 %) nasal spray aerosol      rosuvastatin (CRESTOR) 10 MG Tab Take 1 Tablet by mouth every evening. 90 Tablet 3    tizanidine (ZANAFLEX) 4 MG Tab Take 1 Tablet by mouth at bedtime as needed (joint pain / muscle tightness). 90 Tablet 1    fluticasone (FLONASE) 50 MCG/ACT nasal spray USE 1 SPRAYS IN EACH NOSTRIL DAILY-GENERIC FOR FLONASE (60 days) 16 g 4    montelukast (SINGULAIR) 10 MG Tab Take 1 Tablet by mouth every day. 30 Tablet 6    valacyclovir (VALTREX) 1 GM Tab Take 1 Tab by mouth 2 times a day. 180 Tab 3    NON SPECIFIED ResMed AirMini with P10 Setup Pack and Mask     Add Travel Case 1 Each 0    epinephrine (EPIPEN) 0.3 MG/0.3ML (1:1000) injection 0.3 mL by Intramuscular route Once PRN for 1 dose. 1 Each 3     No current facility-administered medications for this visit.       Allergies: No Known Allergies    family:   Family History   Problem Relation Age of Onset    Cancer Father         prostate    Hypertension Father     Arthritis Father     Prostate cancer  "Father     Cancer Sister         melanoma    Cancer Paternal Aunt         breast    Diabetes Other     Sleep Apnea Neg Hx        PHYSICAL EXAMINATION:  /80 (BP Location: Right arm, Patient Position: Sitting, BP Cuff Size: Large adult)   Pulse 66   Temp (!) 35.8 °C (96.4 °F)   Resp 12   Ht 1.702 m (5' 7\")   Wt 90.7 kg (200 lb)   SpO2 97%   General appearance:healthy, well developed, well nourished  Psych: alert, no distress, cooperative  Eyes: EOM's normal, pupils equal, round, reactive to light  ENT: Ears: external ears normal to inspection and palpation, TM's clear, Nose/Sinuses: nose shows no deformity, asymmetry, or inflammation  Neck: no asymmetry, masses, or scars, no adenopathy, thyroid normal to palpation  Lungs:chest symmetric with normal A/P diameter, no chest deformities noted, normal respiratory rate and rhythm  Cardiovascular:regular rate and rhythm, S1 normal  Abdomen: umbilicus normal, no masses palpable, no organomegaly  Musculoskeletal:ROM of all joints is normal, no evidence of joint instability  Lymphatic: None significantly enlarged  Skin: no rash, no edema  Neuro: mental status intact, cranial nerves 2-12 intact        ASSESSMENT/PLAN:  1.annual physical exam: HCM:  Pap and breast exams done.  BSE technique reviewed and patient encouraged to perform self-exam monthly.   Encourage daily exercise for at least 30 minutes  Recommend mammogram yearly.  Colonoscopy and Pap smear are up-to-date.  Recommend 1500 mg Calcium with 600 units vit d daily.    Recommend CBC, CMP, TSH, LP, A1c, vitamin D- fasting.  Updated surgical history.  Updated problem list to include neuropathy.  Hep B #1 given today.  pt was counseled regarding all immunizations, what the patient is being immunized against, possible side effects, and the importance of immunization.    Anticipatory guidance:  Encouraged daily physical exercise, high fiber / vegetable based diet, 8 hours of sleep at night, skin protection from " sun with suncreen / clothing, yearly derm consults.

## 2023-07-07 DIAGNOSIS — E78.49 OTHER HYPERLIPIDEMIA: ICD-10-CM

## 2023-07-07 RX ORDER — ROSUVASTATIN CALCIUM 10 MG/1
TABLET, COATED ORAL
Qty: 90 TABLET | Refills: 3 | Status: SHIPPED | OUTPATIENT
Start: 2023-07-07 | End: 2024-03-11 | Stop reason: SDUPTHER

## 2023-07-07 NOTE — TELEPHONE ENCOUNTER
Received request via: Pharmacy    Was the patient seen in the last year in this department? Yes  6/29/23  Does the patient have an active prescription (recently filled or refills available) for medication(s) requested? No    Does the patient have nursing home Plus and need 100 day supply (blood pressure, diabetes and cholesterol meds only)? Medication is not for cholesterol, blood pressure or diabetes

## 2023-10-19 ENCOUNTER — APPOINTMENT (RX ONLY)
Dept: URBAN - METROPOLITAN AREA CLINIC 35 | Facility: CLINIC | Age: 53
Setting detail: DERMATOLOGY
End: 2023-10-19

## 2023-10-19 DIAGNOSIS — L81.4 OTHER MELANIN HYPERPIGMENTATION: ICD-10-CM

## 2023-10-19 DIAGNOSIS — L82.0 INFLAMED SEBORRHEIC KERATOSIS: ICD-10-CM

## 2023-10-19 DIAGNOSIS — Z71.89 OTHER SPECIFIED COUNSELING: ICD-10-CM

## 2023-10-19 DIAGNOSIS — D22 MELANOCYTIC NEVI: ICD-10-CM

## 2023-10-19 DIAGNOSIS — L82.1 OTHER SEBORRHEIC KERATOSIS: ICD-10-CM

## 2023-10-19 DIAGNOSIS — B07.8 OTHER VIRAL WARTS: ICD-10-CM

## 2023-10-19 PROBLEM — D22.5 MELANOCYTIC NEVI OF TRUNK: Status: ACTIVE | Noted: 2023-10-19

## 2023-10-19 PROCEDURE — 17110 DESTRUCTION B9 LES UP TO 14: CPT

## 2023-10-19 PROCEDURE — ? LIQUID NITROGEN

## 2023-10-19 PROCEDURE — 99213 OFFICE O/P EST LOW 20 MIN: CPT | Mod: 25

## 2023-10-19 PROCEDURE — ? TREATMENT REGIMEN

## 2023-10-19 PROCEDURE — ? COUNSELING

## 2023-10-19 ASSESSMENT — LOCATION DETAILED DESCRIPTION DERM
LOCATION DETAILED: LEFT RIB CAGE
LOCATION DETAILED: RIGHT MID-UPPER BACK
LOCATION DETAILED: RIGHT RIB CAGE
LOCATION DETAILED: SUBXIPHOID
LOCATION DETAILED: RIGHT SUPERIOR UPPER BACK
LOCATION DETAILED: LEFT MEDIAL SUPERIOR CHEST
LOCATION DETAILED: LEFT CENTRAL MALAR CHEEK
LOCATION DETAILED: EPIGASTRIC SKIN
LOCATION DETAILED: RIGHT INFERIOR UPPER BACK
LOCATION DETAILED: LEFT PROXIMAL RADIAL PALMAR INDEX FINGER
LOCATION DETAILED: RIGHT INFERIOR LATERAL UPPER BACK
LOCATION DETAILED: LEFT LATERAL ABDOMEN

## 2023-10-19 ASSESSMENT — LOCATION SIMPLE DESCRIPTION DERM
LOCATION SIMPLE: LEFT INDEX FINGER
LOCATION SIMPLE: RIGHT UPPER BACK
LOCATION SIMPLE: CHEST
LOCATION SIMPLE: ABDOMEN
LOCATION SIMPLE: LEFT CHEEK

## 2023-10-19 ASSESSMENT — LOCATION ZONE DERM
LOCATION ZONE: TRUNK
LOCATION ZONE: FINGER
LOCATION ZONE: FACE

## 2023-10-19 NOTE — PROCEDURE: LIQUID NITROGEN
Post-Care Instructions: I reviewed with the patient in detail post-care instructions. Patient is to wear sunprotection, and avoid picking at any of the treated lesions. Pt may apply Vaseline to crusted or scabbing areas.
Detail Level: Detailed
Medical Necessity Information: It is in your best interest to select a reason for this procedure from the list below. All of these items fulfill various CMS LCD requirements except the new and changing color options.
Show Applicator Variable?: Yes
Duration Of Freeze Thaw-Cycle (Seconds): 10
Spray Paint Text: The liquid nitrogen was applied to the skin utilizing a spray paint frosting technique.
Add 52 Modifier (Optional): no
Consent: The patient's consent was obtained including but not limited to risks of crusting, scabbing, blistering, scarring, darker or lighter pigmentary change, recurrence, incomplete removal and infection.
Medical Necessity Clause: This procedure was medically necessary because the lesions that were treated were:
Number Of Freeze-Thaw Cycles: 2 freeze-thaw cycles
Application Tool (Optional): Cry-AC

## 2023-10-19 NOTE — PROCEDURE: REASSURANCE
Rx already sent to [facility] in October
Detail Level: Generalized
Hide Include Location In Plan Question?: No

## 2023-10-19 NOTE — PROCEDURE: TREATMENT REGIMEN
Continue Regimen: Hydroquinone 4% bid for 2-3 months alternating with 1 month off
Detail Level: Zone

## 2023-12-06 ENCOUNTER — APPOINTMENT (RX ONLY)
Dept: URBAN - METROPOLITAN AREA CLINIC 35 | Facility: CLINIC | Age: 53
Setting detail: DERMATOLOGY
End: 2023-12-06

## 2023-12-06 DIAGNOSIS — L82.0 INFLAMED SEBORRHEIC KERATOSIS: ICD-10-CM

## 2023-12-06 DIAGNOSIS — B07.8 OTHER VIRAL WARTS: ICD-10-CM

## 2023-12-06 PROCEDURE — ? LIQUID NITROGEN

## 2023-12-06 PROCEDURE — 11301 SHAVE SKIN LESION 0.6-1.0 CM: CPT

## 2023-12-06 PROCEDURE — 17110 DESTRUCTION B9 LES UP TO 14: CPT | Mod: 59

## 2023-12-06 PROCEDURE — ? SHAVE REMOVAL

## 2023-12-06 ASSESSMENT — LOCATION DETAILED DESCRIPTION DERM
LOCATION DETAILED: RIGHT MEDIAL UPPER BACK
LOCATION DETAILED: LEFT INDEX FINGER PROXIMAL INTERPHALANGEAL JOINT

## 2023-12-06 ASSESSMENT — LOCATION ZONE DERM
LOCATION ZONE: TRUNK
LOCATION ZONE: FINGER

## 2023-12-06 ASSESSMENT — LOCATION SIMPLE DESCRIPTION DERM
LOCATION SIMPLE: RIGHT UPPER BACK
LOCATION SIMPLE: LEFT INDEX FINGER

## 2023-12-06 NOTE — PROCEDURE: SHAVE REMOVAL
Medical Necessity Information: It is in your best interest to select a reason for this procedure from the list below. All of these items fulfill various CMS LCD requirements except the new and changing color options.
Medical Necessity Clause: This procedure was medically necessary because the lesion that was treated was:
Lab: [id number]
Lab Facility: 
Detail Level: Detailed
Was A Bandage Applied: Yes
Size Of Lesion In Cm (Required): 0.6
X Size Of Lesion In Cm (Optional): 0
Depth Of Shave: dermis
Biopsy Method: Personna blade
Anesthesia Type: 1% lidocaine with epinephrine
Hemostasis: Drysol
Wound Care: Petrolatum
Render Path Notes In Note?: No
Consent was obtained from the patient. The risks and benefits to therapy were discussed in detail. Specifically, the risks of infection, scarring, bleeding, prolonged wound healing, incomplete removal, allergy to anesthesia, nerve injury and recurrence were addressed. Prior to the procedure, the treatment site was clearly identified and confirmed by the patient. All components of Universal Protocol/PAUSE Rule completed.
Post-Care Instructions: I reviewed with the patient in detail post-care instructions. Patient is to keep the biopsy site dry overnight, and then apply bacitracin twice daily until healed. Patient may apply hydrogen peroxide soaks to remove any crusting.
Notification Instructions: Patient will be notified of pathology results. However, patient instructed to call the office if not contacted within 2 weeks.
Billing Type: Third-Party Bill

## 2023-12-06 NOTE — PROCEDURE: LIQUID NITROGEN
Show Topical Anesthesia Variable?: Yes
Render Post-Care Instructions In Note?: no
Number Of Freeze-Thaw Cycles: 2 freeze-thaw cycles
Medical Necessity Clause: This procedure was medically necessary because the lesions that were treated were:
Detail Level: Detailed
Duration Of Freeze Thaw-Cycle (Seconds): 10
Post-Care Instructions: I reviewed with the patient in detail post-care instructions. Patient is to wear sunprotection, and avoid picking at any of the treated lesions. Pt may apply Vaseline to crusted or scabbing areas.
Medical Necessity Information: It is in your best interest to select a reason for this procedure from the list below. All of these items fulfill various CMS LCD requirements except the new and changing color options.
Spray Paint Text: The liquid nitrogen was applied to the skin utilizing a spray paint frosting technique.
Pared With?: 15 blade
Consent: The patient's consent was obtained including but not limited to risks of crusting, scabbing, blistering, scarring, darker or lighter pigmentary change, recurrence, incomplete removal and infection.

## 2024-01-15 DIAGNOSIS — M25.50 MULTIPLE JOINT PAIN: ICD-10-CM

## 2024-01-15 RX ORDER — TIZANIDINE 4 MG/1
4 TABLET ORAL NIGHTLY PRN
Qty: 90 TABLET | Refills: 0 | Status: SHIPPED | OUTPATIENT
Start: 2024-01-15

## 2024-01-23 ENCOUNTER — HOSPITAL ENCOUNTER (OUTPATIENT)
Dept: LAB | Facility: MEDICAL CENTER | Age: 54
End: 2024-01-23
Attending: NURSE PRACTITIONER
Payer: COMMERCIAL

## 2024-01-23 DIAGNOSIS — Z00.00 WELL ADULT EXAM: ICD-10-CM

## 2024-01-23 LAB
25(OH)D3 SERPL-MCNC: 34 NG/ML (ref 30–100)
ALBUMIN SERPL BCP-MCNC: 4.4 G/DL (ref 3.2–4.9)
ALBUMIN/GLOB SERPL: 1.5 G/DL
ALP SERPL-CCNC: 88 U/L (ref 30–99)
ALT SERPL-CCNC: 55 U/L (ref 2–50)
ANION GAP SERPL CALC-SCNC: 10 MMOL/L (ref 7–16)
AST SERPL-CCNC: 41 U/L (ref 12–45)
BASOPHILS # BLD AUTO: 1.2 % (ref 0–1.8)
BASOPHILS # BLD: 0.08 K/UL (ref 0–0.12)
BILIRUB SERPL-MCNC: 0.4 MG/DL (ref 0.1–1.5)
BUN SERPL-MCNC: 13 MG/DL (ref 8–22)
CALCIUM ALBUM COR SERPL-MCNC: 9 MG/DL (ref 8.5–10.5)
CALCIUM SERPL-MCNC: 9.3 MG/DL (ref 8.5–10.5)
CHLORIDE SERPL-SCNC: 108 MMOL/L (ref 96–112)
CHOLEST SERPL-MCNC: 171 MG/DL (ref 100–199)
CO2 SERPL-SCNC: 22 MMOL/L (ref 20–33)
CREAT SERPL-MCNC: 0.76 MG/DL (ref 0.5–1.4)
EOSINOPHIL # BLD AUTO: 0.24 K/UL (ref 0–0.51)
EOSINOPHIL NFR BLD: 3.5 % (ref 0–6.9)
ERYTHROCYTE [DISTWIDTH] IN BLOOD BY AUTOMATED COUNT: 43.9 FL (ref 35.9–50)
EST. AVERAGE GLUCOSE BLD GHB EST-MCNC: 120 MG/DL
FASTING STATUS PATIENT QL REPORTED: NORMAL
GFR SERPLBLD CREATININE-BSD FMLA CKD-EPI: 92 ML/MIN/1.73 M 2
GLOBULIN SER CALC-MCNC: 2.9 G/DL (ref 1.9–3.5)
GLUCOSE SERPL-MCNC: 121 MG/DL (ref 65–99)
HBA1C MFR BLD: 5.8 % (ref 4–5.6)
HCT VFR BLD AUTO: 48.7 % (ref 37–47)
HDLC SERPL-MCNC: 52 MG/DL
HGB BLD-MCNC: 16.4 G/DL (ref 12–16)
IMM GRANULOCYTES # BLD AUTO: 0.02 K/UL (ref 0–0.11)
IMM GRANULOCYTES NFR BLD AUTO: 0.3 % (ref 0–0.9)
LDLC SERPL CALC-MCNC: 95 MG/DL
LYMPHOCYTES # BLD AUTO: 2.72 K/UL (ref 1–4.8)
LYMPHOCYTES NFR BLD: 39.6 % (ref 22–41)
MCH RBC QN AUTO: 32.4 PG (ref 27–33)
MCHC RBC AUTO-ENTMCNC: 33.7 G/DL (ref 32.2–35.5)
MCV RBC AUTO: 96.2 FL (ref 81.4–97.8)
MONOCYTES # BLD AUTO: 0.68 K/UL (ref 0–0.85)
MONOCYTES NFR BLD AUTO: 9.9 % (ref 0–13.4)
NEUTROPHILS # BLD AUTO: 3.13 K/UL (ref 1.82–7.42)
NEUTROPHILS NFR BLD: 45.5 % (ref 44–72)
NRBC # BLD AUTO: 0 K/UL
NRBC BLD-RTO: 0 /100 WBC (ref 0–0.2)
PLATELET # BLD AUTO: 274 K/UL (ref 164–446)
PMV BLD AUTO: 11.4 FL (ref 9–12.9)
POTASSIUM SERPL-SCNC: 4.3 MMOL/L (ref 3.6–5.5)
PROT SERPL-MCNC: 7.3 G/DL (ref 6–8.2)
RBC # BLD AUTO: 5.06 M/UL (ref 4.2–5.4)
SODIUM SERPL-SCNC: 140 MMOL/L (ref 135–145)
TRIGL SERPL-MCNC: 118 MG/DL (ref 0–149)
TSH SERPL DL<=0.005 MIU/L-ACNC: 2.99 UIU/ML (ref 0.38–5.33)
WBC # BLD AUTO: 6.9 K/UL (ref 4.8–10.8)

## 2024-01-23 PROCEDURE — 80061 LIPID PANEL: CPT

## 2024-01-23 PROCEDURE — 84443 ASSAY THYROID STIM HORMONE: CPT

## 2024-01-23 PROCEDURE — 85025 COMPLETE CBC W/AUTO DIFF WBC: CPT

## 2024-01-23 PROCEDURE — 83036 HEMOGLOBIN GLYCOSYLATED A1C: CPT

## 2024-01-23 PROCEDURE — 36415 COLL VENOUS BLD VENIPUNCTURE: CPT

## 2024-01-23 PROCEDURE — 82306 VITAMIN D 25 HYDROXY: CPT

## 2024-01-23 PROCEDURE — 80053 COMPREHEN METABOLIC PANEL: CPT

## 2024-01-31 ENCOUNTER — OFFICE VISIT (OUTPATIENT)
Dept: SLEEP MEDICINE | Facility: MEDICAL CENTER | Age: 54
End: 2024-01-31
Attending: PREVENTIVE MEDICINE
Payer: COMMERCIAL

## 2024-01-31 ENCOUNTER — OFFICE VISIT (OUTPATIENT)
Dept: MEDICAL GROUP | Facility: LAB | Age: 54
End: 2024-01-31
Payer: COMMERCIAL

## 2024-01-31 VITALS
OXYGEN SATURATION: 98 % | HEIGHT: 67 IN | WEIGHT: 193 LBS | DIASTOLIC BLOOD PRESSURE: 82 MMHG | HEART RATE: 62 BPM | RESPIRATION RATE: 16 BRPM | BODY MASS INDEX: 30.29 KG/M2 | SYSTOLIC BLOOD PRESSURE: 122 MMHG

## 2024-01-31 VITALS
OXYGEN SATURATION: 98 % | DIASTOLIC BLOOD PRESSURE: 70 MMHG | BODY MASS INDEX: 31.23 KG/M2 | HEART RATE: 62 BPM | WEIGHT: 199 LBS | SYSTOLIC BLOOD PRESSURE: 110 MMHG | HEIGHT: 67 IN | TEMPERATURE: 96.9 F | RESPIRATION RATE: 12 BRPM

## 2024-01-31 DIAGNOSIS — Z23 NEED FOR VACCINATION: ICD-10-CM

## 2024-01-31 DIAGNOSIS — G47.31 COMPLEX SLEEP APNEA SYNDROME: ICD-10-CM

## 2024-01-31 DIAGNOSIS — E78.49 OTHER HYPERLIPIDEMIA: ICD-10-CM

## 2024-01-31 DIAGNOSIS — R74.8 ELEVATED LIVER ENZYMES: ICD-10-CM

## 2024-01-31 DIAGNOSIS — G47.31 CENTRAL SLEEP APNEA: ICD-10-CM

## 2024-01-31 DIAGNOSIS — Z23 NEED FOR HEPATITIS B VACCINATION: ICD-10-CM

## 2024-01-31 DIAGNOSIS — J32.4 CHRONIC PANSINUSITIS: ICD-10-CM

## 2024-01-31 DIAGNOSIS — R73.01 IMPAIRED FASTING GLUCOSE: ICD-10-CM

## 2024-01-31 PROCEDURE — 99214 OFFICE O/P EST MOD 30 MIN: CPT | Performed by: PREVENTIVE MEDICINE

## 2024-01-31 PROCEDURE — 3074F SYST BP LT 130 MM HG: CPT | Performed by: PREVENTIVE MEDICINE

## 2024-01-31 PROCEDURE — 90472 IMMUNIZATION ADMIN EACH ADD: CPT | Performed by: NURSE PRACTITIONER

## 2024-01-31 PROCEDURE — 3078F DIAST BP <80 MM HG: CPT | Performed by: NURSE PRACTITIONER

## 2024-01-31 PROCEDURE — 90686 IIV4 VACC NO PRSV 0.5 ML IM: CPT | Performed by: NURSE PRACTITIONER

## 2024-01-31 PROCEDURE — 90471 IMMUNIZATION ADMIN: CPT | Performed by: NURSE PRACTITIONER

## 2024-01-31 PROCEDURE — 99213 OFFICE O/P EST LOW 20 MIN: CPT | Performed by: PREVENTIVE MEDICINE

## 2024-01-31 PROCEDURE — 99214 OFFICE O/P EST MOD 30 MIN: CPT | Mod: 25 | Performed by: NURSE PRACTITIONER

## 2024-01-31 PROCEDURE — 3079F DIAST BP 80-89 MM HG: CPT | Performed by: PREVENTIVE MEDICINE

## 2024-01-31 PROCEDURE — 90746 HEPB VACCINE 3 DOSE ADULT IM: CPT | Performed by: NURSE PRACTITIONER

## 2024-01-31 PROCEDURE — 3074F SYST BP LT 130 MM HG: CPT | Performed by: NURSE PRACTITIONER

## 2024-01-31 ASSESSMENT — FIBROSIS 4 INDEX
FIB4 SCORE: 1.11
FIB4 SCORE: 1.11

## 2024-01-31 ASSESSMENT — PATIENT HEALTH QUESTIONNAIRE - PHQ9: CLINICAL INTERPRETATION OF PHQ2 SCORE: 0

## 2024-01-31 NOTE — ASSESSMENT & PLAN NOTE
Chronic issue.  Struggles with weight loss.  Exercises regularly.  Does enjoy eating out with her new .

## 2024-01-31 NOTE — ASSESSMENT & PLAN NOTE
Chronic issue.  A1c stable at 5.8.  Does struggle with avoidance of some carbohydrates.  Exercises regularly.

## 2024-01-31 NOTE — PROGRESS NOTES
"Chief Complaint   Patient presents with    Lab Results       HPI:  Krissy is a 54-year-old established female here to follow-up on chronic issues but overall feeling well.    BMI 31.0-31.9,adult  Chronic issue.  Struggles with weight loss.  Exercises regularly.  Does enjoy eating out with her new .    Central sleep apnea  Chronic issue.  Diligent about using BiPAP.  Sees sleep medicine today.    Chronic pansinusitis - Dr. Madrid - possible monoclonal antibody tx  Chronic issue.  Off montelukast because of lack of efficacy.  Considering monoclonal antibody injection treatment with her ENT.    Hyperlipidemia  Chronic issue.  Again working on healthy eating and exercise.  Doing well with rosuvastatin 10 mg.  Had myalgias with other statins.  CT cardiac score of 86.  LDL down in the 90s now.    Impaired fasting glucose  Chronic issue.  A1c stable at 5.8.  Does struggle with avoidance of some carbohydrates.  Exercises regularly.       Exam:   /70 (BP Location: Left arm, Patient Position: Sitting, BP Cuff Size: Large adult)   Pulse 62   Temp 36.1 °C (96.9 °F)   Resp 12   Ht 1.702 m (5' 7\")   Wt 90.3 kg (199 lb)   LMP 07/12/2011   SpO2 98%   BMI 31.17 kg/m²   Gen. appears healthy in no distress   Skin appropriate for sex and age   Neck trachea is midline  Lungs unlabored breathing  Heart regular rate  Neuro gait and station normal   Psych appropriate, calm, interactive, well-groomed    Assessment / Plan:  \"  1. Central sleep apnea        2. Chronic pansinusitis - Dr. Madrid - possible monoclonal antibody tx        3. Impaired fasting glucose        4. Other hyperlipidemia        5. Need for hepatitis B vaccination  Hepatitis B Vaccine Adult 20+      6. Elevated liver enzymes        7. Need for vaccination  INFLUENZA VACCINE QUAD INJ (PF)      8. BMI 31.0-31.9,adult        Followed closely by ENT and looking forward to possible more efficacious treatment with monoclonal antibody injections.    Looking " forward to seeing sleep medicine today.  Diligent about wearing BiPAP nightly.    Encouraged her to increase rosuvastatin to 15 mg for 1 week and then up to 20 mg to lower her LDL down below 90, preferably in the 70s especially considering her CT cardiac score and her elevated liver enzymes.  Likely fatty liver disease as she has had a dry January and tries to limit her alcohol intake.  She declines a liver ultrasound.  Will recheck her liver enzymes in about 6 months.  Encouraged her to eat a high-fiber diet.  She will send me a message via [employer] regarding how she is doing on a higher dose of her statin and I will change her prescription if she is tolerating 20 mg.    Updated influenza and hepatitis B today.    Encouraged her to speak with her insurance about coverage of medication such as Wegovy and Zepbound which would help her lose weight, decrease her risk of worsening fatty liver disease and her risk of developing type 2 diabetes.

## 2024-01-31 NOTE — ASSESSMENT & PLAN NOTE
Chronic issue.  Again working on healthy eating and exercise.  Doing well with rosuvastatin 10 mg.  Had myalgias with other statins.  CT cardiac score of 86.  LDL down in the 90s now.

## 2024-01-31 NOTE — ASSESSMENT & PLAN NOTE
Chronic issue.  Off montelukast because of lack of efficacy.  Considering monoclonal antibody injection treatment with her ENT.

## 2024-01-31 NOTE — PATIENT INSTRUCTIONS
-Ask insurance if they will cover wegovy (ozempic) or zepbound (mounjaro) for a diagnosis of prediabetes / bmi (what bmi) / sleep apnea.

## 2024-01-31 NOTE — PROGRESS NOTES
CHIEF COMPLAINT: Compliance check/Annual Visit/First Compliance  LAST SEEN: ALIRIO Wang  on 1/10/2023  HISTORY OF PRESENT ILLNESS:  Fartun Fuentes is a 54 y.o.female   who is here today to follow-up  for Central Sleep Apnea..      COMPLIANCE DATA greater than 4 hours: 93 %  Machine type:   ASV  Air Curve  Date range:  1/1-1/30/2024  AHI: 2.2  TIME USED:  8 hrs. 15 mins  PRESSURE SETTINGS:  mode ASV, EPAP 7, PS 15/5   LEAK:  minimal  DME:  Preferred    This patient is using PAP therapy consistently and is benefiting from it .  Sleep Study History:   PSG titration study from 1/3/17 indicated an excellent response to servo adaptive BiPAP ventilation in a patient with previously identified severe sleep apnea hypopnea including a prominent component of central apnea resistant to CPAP and BiPAP therapy. BiPAP was adjusted across a pressure range of 11-16/6-8 cm water with persistence of hypopnea and central apnea events. Overall there were 100 episodes of central apnea, 4 obstructive apneas and 115 episodes of hypopnea. Therefore servo adaptive BiPAP ventilation was initiated with a minimum expiratory pressure range of 8-10 cm water and pressure support. In the final pressure stage, the apnea hypopnea index was 1.8 events per hour and the lowest arterial oxygen saturation was 90% on room air.      PSG split night study from 12/3/16 indicated very severe sleep apnea hypopnea with an apnea hypopnea index of 120.6 events per hour and a predominance of central apnea episodes suggesting primary central sleep apnea or complex sleep apnea.  Central apnea accounted for 57.4% during diagnostic portion of study.  Nocturnal hypoxemia with a lowest arterial oxygen saturation of 78% on room air. There is a good, but perhaps incomplete, response to CPAP therapy.      Significant comorbidities and modifying factors: see below    PAST MEDICAL HISTORY:  Past Medical History:   Diagnosis Date    Allergic rhinitis     Allergy      Anxiety     Chickenpox     Heart burn     High cholesterol     Influenza     Psychiatric problem     depression    Sleep apnea     cpap    Snoring     sleep apnea questionairre completed    Tubal ligation       PROBLEM LIST:  Patient Active Problem List    Diagnosis Date Noted    Neuropathy - left leg r/t injury 2019 06/29/2023    Unspecified asthma, uncomplicated 04/19/2021    Hypertrophy of nasal turbinates 04/07/2020    Chronic pansinusitis - Dr. Madrid - possible monoclonal antibody tx 04/07/2020    Gastric polyps 10/24/2019    HSV-2 infection 10/24/2019    Impaired fasting glucose 10/24/2019    BMI 31.0-31.9,adult 09/06/2018    Family history of melanoma 02/21/2018    Central sleep apnea 12/08/2016    Menopause 03/19/2014    GERD (gastroesophageal reflux disease) 03/19/2014    Ocular migraine 01/18/2012    Hyperlipidemia 01/18/2012    Anxiety      PAST SOCIAL HISTORY:  Past Surgical History:   Procedure Laterality Date    GASTROSCOPY-ENDO  12/10/2019    Procedure: GASTROSCOPY-POSSIBLE BIOPSY, DILATION, POLYPECTOMY, CONTROL OF HEMORRHAGE;  Surgeon: Pb Abdi M.D.;  Location: SURGERY [facility];  Service: Gastroenterology    BUNIONECTOMY  05/18/2011    Performed by MOLLY GARCIA at [facility]    CHEILECTOMY  05/18/2011    Performed by MOLLY GARCIA at [facility]    BONE SPUR EXCISION  05/18/2011    Performed by MOLLY GARCIA at [facility]    ESWT  05/18/2011    Performed by MOLLY GARCIA at [facility]    ABDOMINOPLASTY  2009    TUBAL COAGULATION LAPAROSCOPIC BILATERAL  2007    APPENDECTOMY  03/2003    ORIF, FOOT  1988    left    TONSILLECTOMY  1981    ANKLE ARTHROSCOPY Left     [facility] - mid june 2023    TOE ARTHROPLASTY Left     [facility] - mid June 2023     PAST FAMILY HISTORY:  Family History   Problem Relation Age of Onset    Cancer Father         prostate    Hypertension Father     Arthritis Father     Prostate cancer  Father     Cancer Sister         melanoma    Cancer Paternal Aunt         breast    Diabetes Other     Sleep Apnea Neg Hx      SOCIAL HISTORY:  Social History     Socioeconomic History    Marital status:      Spouse name: Not on file    Number of children: Not on file    Years of education: Not on file    Highest education level: Master's degree (e.g., MA, MS, Ninfa, MEd, MSW, LAURA)   Occupational History    Not on file   Tobacco Use    Smoking status: Never    Smokeless tobacco: Never   Vaping Use    Vaping Use: Never used   Substance and Sexual Activity    Alcohol use: Not Currently     Comment: 4 per week    Drug use: No    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: , 2 children, teacher   Other Topics Concern    Not on file   Social History Narrative    Not on file     Social Determinants of Health     Financial Resource Strain: Low Risk  (12/19/2021)    Overall Financial Resource Strain (CARDIA)     Difficulty of Paying Living Expenses: Not hard at all   Food Insecurity: No Food Insecurity (12/19/2021)    Hunger Vital Sign     Worried About Running Out of Food in the Last Year: Never true     Ran Out of Food in the Last Year: Never true   Transportation Needs: No Transportation Needs (12/19/2021)    PRAPARE - Transportation     Lack of Transportation (Medical): No     Lack of Transportation (Non-Medical): No   Physical Activity: Sufficiently Active (12/19/2021)    Exercise Vital Sign     Days of Exercise per Week: 4 days     Minutes of Exercise per Session: 60 min   Stress: No Stress Concern Present (12/19/2021)    [employer] [place] [employer] - Occupational Stress Questionnaire     Feeling of Stress : Only a little   Social Connections: Unknown (12/19/2021)    Social Connection and Isolation Panel [NHANES]     Frequency of Communication with Friends and Family: More than three times a week     Frequency of Social Gatherings with Friends and Family: More than three times a week      "Attends Jew Services: Patient refused     Active Member of Clubs or Organizations: Yes     Attends Club or Organization Meetings: More than 4 times per year     Marital Status: Living with partner   Intimate Partner Violence: Not on file   Housing Stability: Low Risk  (12/19/2021)    Housing Stability Vital Sign     Unable to Pay for Housing in the Last Year: No     Number of Places Lived in the Last Year: 1     Unstable Housing in the Last Year: No     ALLERGIES: Asa [aspirin], Nsaids, Sulfa drugs, Augmentin, Clindamycin, and Contrast media with iodine [iodine]  MEDICATIONS:  Current Outpatient Medications   Medication Sig Dispense Refill    tizanidine (ZANAFLEX) 4 MG Tab Take 1 Tablet by mouth at bedtime as needed (joint pain / muscle tightness). 90 Tablet 0    rosuvastatin (CRESTOR) 10 MG Tab TAKE 1 TABLET BY MOUTH EVERY DAY IN THE EVENING 90 Tablet 3    EPINEPHrine (EPIPEN) 0.3 MG/0.3ML Solution Auto-injector solution for injection Inject 0.3 mL into the thigh one time for 1 dose 1 Each 4    azelastine (ASTELIN) 137 MCG/SPRAY nasal spray azelastine 137 mcg (0.1 %) nasal spray aerosol      fluticasone (FLONASE) 50 MCG/ACT nasal spray USE 1 SPRAYS IN EACH NOSTRIL DAILY-GENERIC FOR FLONASE (60 days) 16 g 4    valacyclovir (VALTREX) 1 GM Tab Take 1 Tab by mouth 2 times a day. 180 Tab 3    NON SPECIFIED ResMed AirMini with P10 Setup Pack and Mask     Add Travel Case 1 Each 0     No current facility-administered medications for this visit.    \"CURRENT RX\"    REVIEW OF SYSTEMS:  SEE HPI      PHYSICAL EXAM/VITALS:  /82 (BP Location: Left arm, Patient Position: Sitting, BP Cuff Size: Adult)   Pulse 62   Resp 16   Ht 1.702 m (5' 7\")   Wt 87.5 kg (193 lb)   LMP 07/12/2011   SpO2 98%   BMI 30.23 kg/m²   Appearance: Well-nourished, well-developed,  looks stated age, no acute distress  Eyes:   EOMI  ENMT:  WNL  Neck: Supple, trachea midline  Respiratory effort:  No intercostal retractions or use of " accessory muscles  Musculoskeletal:  Grossly normal; gait and station normal  Neurologic:  oriented to person, time, place, and purpose; judgement intact  Psychiatric:  No depression, anxiety, agitation    MEDICAL DECISION MAKING:  The medical record was reviewed as it pertains to this referral. This includes records from primary care,consultants notes, referral request, hospital records, labs and imaging. Any available diagnostic and titration nocturnal polysomnograms, home sleep apnea tests, continuous nocturnal oximetry results, multiple sleep latency tests, and recent compliance reports were reviewed with the patient.    ASSESSMENT/PLAN:  Fartun Fuentes ( name)  is a 54 y.o.female who is doing well on ASV therapy. No changes are recommended today.  Mask and supplies will be ordered.        DIAGNOSES :    1. Central sleep apnea  - DME Mask and Supplies    2. Complex sleep apnea syndrome  - DME Mask and Supplies        The risks of untreated sleep apnea were discussed with the patient at length. Patients with CARI are at increased risk of cardiovascular disease including coronary artery disease, systemic arterial hypertension, pulmonary arterial hypertension, cardiac arrhythmias, and stroke. CARI patients have an increased risk of motor vehicle accidents, type 2 diabetes, chronic kidney disease, and non-alcoholic liver disease. The patient was advised to avoid driving a motor vehicle when drowsy.  Have advised the patient to follow up with the appropriate healthcare practitioners for all other medical problems and issues.    RETURN TO CLINIC: Return in about 1 year (around 1/31/2025) for Annual visit.    My total time spent caring for the patient on the day of the encounter was 40 minutes. This includes time spent on a thorough chart review including other physician notes, all sleep studies, as well as critical labs and pulmonary and cardiac studies.  Additionally, it includes a thorough discussion  of good sleep hygiene and stimulus control, as well as  the need for consistency in terms of sleep preparation and practice.    Please note that this dictation was created using voice recognition software.  I have made every reasonable attempt to correct obvious errors, I expect that there are errors of grammar and possibly content that I did not discover before finalizing this note.

## 2024-02-05 ENCOUNTER — PATIENT MESSAGE (OUTPATIENT)
Dept: MEDICAL GROUP | Facility: LAB | Age: 54
End: 2024-02-05
Payer: COMMERCIAL

## 2024-02-06 RX ORDER — ORAL SEMAGLUTIDE 7 MG/1
1 TABLET ORAL
Qty: 30 TABLET | Refills: 1 | Status: SHIPPED
Start: 2024-02-06

## 2024-02-14 RX ORDER — AZELASTINE 1 MG/ML
SPRAY, METERED NASAL
Qty: 30 ML | Refills: 5 | Status: SHIPPED | OUTPATIENT
Start: 2024-02-14

## 2024-02-15 NOTE — TELEPHONE ENCOUNTER
Received request via: Pharmacy    Was the patient seen in the last year in this department? Yes    Does the patient have an active prescription (recently filled or refills available) for medication(s) requested? No    Pharmacy Name: [facility]    Does the patient have intermediate Plus and need 100 day supply (blood pressure, diabetes and cholesterol meds only)? Patient does not have SCP

## 2024-02-20 DIAGNOSIS — J32.8 OTHER CHRONIC SINUSITIS: ICD-10-CM

## 2024-02-20 RX ORDER — AZELASTINE 1 MG/ML
1 SPRAY, METERED NASAL 2 TIMES DAILY
Qty: 30 ML | Refills: 4 | Status: SHIPPED | OUTPATIENT
Start: 2024-02-20

## 2024-02-26 ENCOUNTER — PATIENT MESSAGE (OUTPATIENT)
Dept: MEDICAL GROUP | Facility: LAB | Age: 54
End: 2024-02-26
Payer: COMMERCIAL

## 2024-02-26 RX ORDER — TOBRAMYCIN 3 MG/ML
1 SOLUTION/ DROPS OPHTHALMIC EVERY 4 HOURS
Qty: 5 ML | Refills: 9 | Status: SHIPPED | OUTPATIENT
Start: 2024-02-26

## 2024-03-10 ENCOUNTER — PATIENT MESSAGE (OUTPATIENT)
Dept: MEDICAL GROUP | Facility: LAB | Age: 54
End: 2024-03-10
Payer: COMMERCIAL

## 2024-03-11 DIAGNOSIS — E78.49 OTHER HYPERLIPIDEMIA: ICD-10-CM

## 2024-03-11 RX ORDER — ROSUVASTATIN CALCIUM 10 MG/1
20 TABLET, COATED ORAL EVERY EVENING
Qty: 180 TABLET | Refills: 3 | Status: SHIPPED | OUTPATIENT
Start: 2024-03-11 | End: 2024-03-12

## 2024-03-12 RX ORDER — ATORVASTATIN CALCIUM 40 MG/1
40 TABLET, FILM COATED ORAL DAILY
Qty: 90 TABLET | Refills: 3 | Status: SHIPPED | OUTPATIENT
Start: 2024-03-12 | End: 2024-03-14

## 2024-03-14 ENCOUNTER — TELEPHONE (OUTPATIENT)
Dept: MEDICAL GROUP | Facility: LAB | Age: 54
End: 2024-03-14
Payer: COMMERCIAL

## 2024-03-14 DIAGNOSIS — E78.49 OTHER HYPERLIPIDEMIA: ICD-10-CM

## 2024-03-14 RX ORDER — ROSUVASTATIN CALCIUM 40 MG/1
40 TABLET, COATED ORAL DAILY
Qty: 30 TABLET | Refills: 3 | Status: SHIPPED | OUTPATIENT
Start: 2024-03-14 | End: 2024-03-14

## 2024-03-14 RX ORDER — ROSUVASTATIN CALCIUM 20 MG/1
20 TABLET, COATED ORAL EVERY EVENING
Qty: 30 TABLET | Refills: 11 | Status: SHIPPED | OUTPATIENT
Start: 2024-03-14

## 2024-03-30 ENCOUNTER — PATIENT MESSAGE (OUTPATIENT)
Dept: MEDICAL GROUP | Facility: LAB | Age: 54
End: 2024-03-30
Payer: COMMERCIAL

## 2024-04-01 RX ORDER — ORAL SEMAGLUTIDE 14 MG/1
14 TABLET ORAL DAILY
Qty: 30 TABLET | Refills: 2 | Status: SHIPPED
Start: 2024-04-01

## 2024-04-11 DIAGNOSIS — M25.50 MULTIPLE JOINT PAIN: ICD-10-CM

## 2024-04-11 RX ORDER — TIZANIDINE 4 MG/1
4 TABLET ORAL NIGHTLY PRN
Qty: 90 TABLET | Refills: 0 | Status: SHIPPED | OUTPATIENT
Start: 2024-04-11

## 2024-06-13 ENCOUNTER — PATIENT MESSAGE (OUTPATIENT)
Dept: MEDICAL GROUP | Facility: LAB | Age: 54
End: 2024-06-13
Payer: COMMERCIAL

## 2024-06-13 DIAGNOSIS — G62.9 NEUROPATHY: ICD-10-CM

## 2024-06-13 DIAGNOSIS — M79.605 LEFT LEG PAIN: ICD-10-CM

## 2024-06-24 ENCOUNTER — OFFICE VISIT (OUTPATIENT)
Dept: MEDICAL GROUP | Facility: LAB | Age: 54
End: 2024-06-24
Payer: COMMERCIAL

## 2024-06-24 VITALS
TEMPERATURE: 96.3 F | DIASTOLIC BLOOD PRESSURE: 64 MMHG | SYSTOLIC BLOOD PRESSURE: 118 MMHG | BODY MASS INDEX: 29.66 KG/M2 | OXYGEN SATURATION: 97 % | HEIGHT: 67 IN | WEIGHT: 189 LBS | HEART RATE: 66 BPM | RESPIRATION RATE: 16 BRPM

## 2024-06-24 DIAGNOSIS — R73.01 IMPAIRED FASTING GLUCOSE: ICD-10-CM

## 2024-06-24 DIAGNOSIS — M25.552 LEFT HIP PAIN: ICD-10-CM

## 2024-06-24 DIAGNOSIS — E78.49 OTHER HYPERLIPIDEMIA: ICD-10-CM

## 2024-06-24 PROCEDURE — 99214 OFFICE O/P EST MOD 30 MIN: CPT | Performed by: NURSE PRACTITIONER

## 2024-06-24 PROCEDURE — 3074F SYST BP LT 130 MM HG: CPT | Performed by: NURSE PRACTITIONER

## 2024-06-24 PROCEDURE — 3078F DIAST BP <80 MM HG: CPT | Performed by: NURSE PRACTITIONER

## 2024-06-24 RX ORDER — ORAL SEMAGLUTIDE 14 MG/1
14 TABLET ORAL DAILY
Qty: 30 TABLET | Refills: 2 | Status: SHIPPED
Start: 2024-06-24

## 2024-06-24 RX ORDER — MEPOLIZUMAB 100 MG/ML
100 INJECTION, SOLUTION SUBCUTANEOUS
COMMUNITY

## 2024-06-24 RX ORDER — METHYLPREDNISOLONE 4 MG/1
TABLET ORAL
Qty: 21 TABLET | Refills: 0 | Status: SHIPPED | OUTPATIENT
Start: 2024-06-24 | End: 2024-06-27 | Stop reason: SDUPTHER

## 2024-06-24 ASSESSMENT — FIBROSIS 4 INDEX: FIB4 SCORE: 1.11

## 2024-06-24 NOTE — PROGRESS NOTES
Verbal consent was acquired by the patient to use [employer] ambient listening note generation during this visit Yes      Subjective   Fartun Fuentes is a 54 y.o. female who presents for f/u  History of Present Illness  The patient presents for evaluation of multiple medical concerns.    The patient has been experiencing severe left leg pain for the past 16 days, following a gardening activity involving digging 5 to 10-gallon holes and jumping on a shovel repetitively. The left hip pain, described as a burning / aching / tight sensation, was severe enough to render her unable to walk for several days.  The pain has gradually improved, currently rated as 1 and 4 at night from 9/10. She has been managing her pain with gabapentin once daily and tizanidine at night to aid sleep. An x-ray revealed a bone spur and calcification - she is awaiting MRI.  She has a history of sliding a softball.   She maintains an active lifestyle, including regular stretching, bike riding and weightlifting at the gym. She is unable to take anti-inflammatories due to an allergy to nsaids.   She does tolerate steroids.  She is scheduled for a follow-up with Dr. Reddy post-MRI.  She uses a heating pad at night, which provides some relief. The patient's pain intensifies when sitting, but not when standing. Overall improving.      The patient requests a refill of her Rybelsus prescription. She reports a weight loss of approximately 20 pounds, dropping from 204 pounds to 185 pounds. She expresses a desire to lose an additional 20 pounds. She continues to adhere to a low-carbohydrate diet.  She takes 2 Metamucil gummies a day to help with constipation.     Hyperlipidemia: She is tolerating a higher dose of rosuvastatin without any side effects and would like to repeat her lipid panel prior to following up here for her annual exam with Pap.  Also has a history of prediabetes.     She is allergic to ANTI-INFLAMMATORIES.    Review of  "Systems  Neg except hpi  Objective   /64 (BP Location: Right arm, Patient Position: Sitting, BP Cuff Size: Large adult)   Pulse 66   Temp (!) 35.7 °C (96.3 °F)   Resp 16   Ht 1.702 m (5' 7\")   Wt 85.7 kg (189 lb)   SpO2 97%   Physical Exam  Gen: NAD  Resp: nonlabored.  Able to speak in full sentences  Psy: pleasant / cooperative.   She does have full range of motion of her left lower extremity and states that it feels good to stretch her left hip finally, it was very uncomfortable last week.  Neuro:  Alert and oriented x 3        Assessment & Plan  1.  Left hip pain.  We discussed overuse syndrome/inflammation caused by a new activity that she does repetitively such as digging large holes/jumping up and down on a shovel, which is atypical for her.  Reviewed notes from orthopedics.  She is looking forward to an MRI.  Sleeping well with tizanidine and gabapentin, which she may take together at night as needed for pain/tightness/muscle spasms.  Encouraged her to continue to stretch diligently and maintain her physical exercise program as tolerated such as biking, stretching and weightlifting.  Discussed heat versus ice as well as hot tub/bath tubs after extreme activity versus a cold bath.  She plans on following up with orthopedics and having an MRI.  I gave her a steroid pack to have on hand should something like this happen to her again, especially over a weekend, holiday or if she is unable to get into our office/orthopedics.    2.  Impaired fasting glucose: Recommend updated A1c prior to our appointment in August for her Pap smear.  Likely improved with her weight loss and Rybelsus intake.    3.  Overweight: Improving.  Responding well to Rybelsus, this was refilled.    4.  Hyperlipidemia: Likely improved.  Recommend updated lipid panel prior to our appointment in August.  Tolerating rosuvastatin.      Side effects of all medications prescribed today were discussed with the patient including how to " take the medications and proper dosage. Discussed repercussions of not taking the medications as prescribed. Instructed to call the office should she have any negative side effects or problems with the medications.               Please note that this dictation was created using voice recognition software. I have made every reasonable attempt to correct obvious errors, but I expect that there are errors of grammar and possibly content that I did not discover before finalizing the note.

## 2024-06-26 ENCOUNTER — HOSPITAL ENCOUNTER (OUTPATIENT)
Dept: RADIOLOGY | Facility: MEDICAL CENTER | Age: 54
End: 2024-06-26
Attending: NURSE PRACTITIONER
Payer: COMMERCIAL

## 2024-06-26 DIAGNOSIS — Z12.31 VISIT FOR SCREENING MAMMOGRAM: ICD-10-CM

## 2024-06-26 PROCEDURE — 77063 BREAST TOMOSYNTHESIS BI: CPT

## 2024-06-27 DIAGNOSIS — M25.552 LEFT HIP PAIN: ICD-10-CM

## 2024-06-27 RX ORDER — METHYLPREDNISOLONE 4 MG/1
TABLET ORAL
Qty: 21 TABLET | Refills: 0 | Status: SHIPPED | OUTPATIENT
Start: 2024-06-27

## 2024-07-16 ENCOUNTER — OFFICE VISIT (OUTPATIENT)
Dept: SURGICAL ONCOLOGY | Facility: MEDICAL CENTER | Age: 54
End: 2024-07-16
Payer: COMMERCIAL

## 2024-07-16 VITALS
HEART RATE: 60 BPM | OXYGEN SATURATION: 95 % | HEIGHT: 67 IN | DIASTOLIC BLOOD PRESSURE: 88 MMHG | BODY MASS INDEX: 29.82 KG/M2 | SYSTOLIC BLOOD PRESSURE: 132 MMHG | TEMPERATURE: 98.4 F | WEIGHT: 190 LBS

## 2024-07-16 DIAGNOSIS — M79.89 SOFT TISSUE MASS: ICD-10-CM

## 2024-07-16 PROCEDURE — 99204 OFFICE O/P NEW MOD 45 MIN: CPT | Performed by: ORTHOPAEDIC SURGERY

## 2024-07-16 PROCEDURE — 3075F SYST BP GE 130 - 139MM HG: CPT | Performed by: ORTHOPAEDIC SURGERY

## 2024-07-16 PROCEDURE — 3079F DIAST BP 80-89 MM HG: CPT | Performed by: ORTHOPAEDIC SURGERY

## 2024-07-16 ASSESSMENT — FIBROSIS 4 INDEX: FIB4 SCORE: 1.11

## 2024-07-17 ASSESSMENT — ENCOUNTER SYMPTOMS
CHILLS: 0
SHORTNESS OF BREATH: 0
MYALGIAS: 1
SENSORY CHANGE: 0
FEVER: 0
WEAKNESS: 0

## 2024-08-08 ENCOUNTER — HOSPITAL ENCOUNTER (OUTPATIENT)
Dept: RADIOLOGY | Facility: MEDICAL CENTER | Age: 54
End: 2024-08-08
Attending: ORTHOPAEDIC SURGERY
Payer: COMMERCIAL

## 2024-08-08 PROCEDURE — 73720 MRI LWR EXTREMITY W/O&W/DYE: CPT | Mod: LT

## 2024-08-08 PROCEDURE — A9579 GAD-BASE MR CONTRAST NOS,1ML: HCPCS | Mod: JZ | Performed by: ORTHOPAEDIC SURGERY

## 2024-08-08 PROCEDURE — 700117 HCHG RX CONTRAST REV CODE 255: Mod: JZ | Performed by: ORTHOPAEDIC SURGERY

## 2024-08-08 RX ADMIN — GADOTERIDOL 19 ML: 279.3 INJECTION, SOLUTION INTRAVENOUS at 18:40

## 2024-08-09 NOTE — PROGRESS NOTES
"MRI NURSING NOTES:    Pt was labeled Kali allergy for MRI today but she confirms that she's never received MRI with contrast.  She did have a reaction of \"itchy hands\" after what she thinks is a CT in 2004.    Pt also did not premed.  Pt states \"no one sent an order to my pharmacy.\"    Pt received MRI femur c/s contrast today.  Monitored  15 mins p Kali.  Reported no itching.  Pt had no complaints of abnormality.  PT dc'd p PIV dc.    "

## 2024-08-12 ENCOUNTER — TELEPHONE (OUTPATIENT)
Dept: SURGICAL ONCOLOGY | Facility: MEDICAL CENTER | Age: 54
End: 2024-08-12
Payer: COMMERCIAL

## 2024-08-12 NOTE — TELEPHONE ENCOUNTER
I called Olga to review the MRI of her femur.  She has a benign-appearing cyst at the distal insertion of the gluteus tory tendon.  It is hyperintense on fluid sequences and hypointense on fat sensitive sequences.  It only rim enhances postcontrast.  This appears to be most likely related to tendinosis at the distal insertion of the gluteus tory.  We discussed treatment options and because she has been struggling with this for so long she would like to proceed with excision of the cyst and repair of the gluteus tory tendon at its distal insertion.  I think this is very reasonable given the length of time she has dealt with it.  A full PARQ was held, she verbalized understanding and wished to proceed.  She will be scheduled for excision of left hip soft tissue mass with repair of the gluteus tory tendon.    Martita Madsen,   Orthopedic Oncology and General Orthopedics   [facility]

## 2024-08-15 ENCOUNTER — TELEPHONE (OUTPATIENT)
Dept: SURGICAL ONCOLOGY | Facility: MEDICAL CENTER | Age: 54
End: 2024-08-15
Payer: COMMERCIAL

## 2024-08-15 NOTE — TELEPHONE ENCOUNTER
I called patient to schedule surgery with Dr. Madsen. I left my direct number [phone].    Thank you,  Sarah Surgery Scheduler

## 2024-08-21 ENCOUNTER — OFFICE VISIT (OUTPATIENT)
Dept: MEDICAL GROUP | Facility: LAB | Age: 54
End: 2024-08-21
Payer: COMMERCIAL

## 2024-08-21 ENCOUNTER — HOSPITAL ENCOUNTER (OUTPATIENT)
Facility: MEDICAL CENTER | Age: 54
End: 2024-08-21
Attending: NURSE PRACTITIONER
Payer: COMMERCIAL

## 2024-08-21 VITALS
BODY MASS INDEX: 30.29 KG/M2 | TEMPERATURE: 96.4 F | RESPIRATION RATE: 12 BRPM | WEIGHT: 193 LBS | OXYGEN SATURATION: 97 % | SYSTOLIC BLOOD PRESSURE: 118 MMHG | DIASTOLIC BLOOD PRESSURE: 70 MMHG | HEIGHT: 67 IN | HEART RATE: 68 BPM

## 2024-08-21 DIAGNOSIS — Z12.4 SCREENING FOR MALIGNANT NEOPLASM OF CERVIX: ICD-10-CM

## 2024-08-21 DIAGNOSIS — Z23 NEED FOR HEPATITIS B VACCINATION: ICD-10-CM

## 2024-08-21 DIAGNOSIS — Z01.419 ENCOUNTER FOR GYNECOLOGICAL EXAMINATION: ICD-10-CM

## 2024-08-21 PROCEDURE — 90471 IMMUNIZATION ADMIN: CPT | Performed by: NURSE PRACTITIONER

## 2024-08-21 PROCEDURE — 3078F DIAST BP <80 MM HG: CPT | Performed by: NURSE PRACTITIONER

## 2024-08-21 PROCEDURE — 99396 PREV VISIT EST AGE 40-64: CPT | Mod: 25 | Performed by: NURSE PRACTITIONER

## 2024-08-21 PROCEDURE — 88175 CYTOPATH C/V AUTO FLUID REDO: CPT

## 2024-08-21 PROCEDURE — 90746 HEPB VACCINE 3 DOSE ADULT IM: CPT | Mod: JZ | Performed by: NURSE PRACTITIONER

## 2024-08-21 PROCEDURE — 3074F SYST BP LT 130 MM HG: CPT | Performed by: NURSE PRACTITIONER

## 2024-08-21 SDOH — HEALTH STABILITY: PHYSICAL HEALTH: ON AVERAGE, HOW MANY DAYS PER WEEK DO YOU ENGAGE IN MODERATE TO STRENUOUS EXERCISE (LIKE A BRISK WALK)?: 5 DAYS

## 2024-08-21 SDOH — ECONOMIC STABILITY: FOOD INSECURITY: WITHIN THE PAST 12 MONTHS, THE FOOD YOU BOUGHT JUST DIDN'T LAST AND YOU DIDN'T HAVE MONEY TO GET MORE.: NEVER TRUE

## 2024-08-21 SDOH — ECONOMIC STABILITY: INCOME INSECURITY: IN THE LAST 12 MONTHS, WAS THERE A TIME WHEN YOU WERE NOT ABLE TO PAY THE MORTGAGE OR RENT ON TIME?: NO

## 2024-08-21 SDOH — HEALTH STABILITY: PHYSICAL HEALTH: ON AVERAGE, HOW MANY MINUTES DO YOU ENGAGE IN EXERCISE AT THIS LEVEL?: 60 MIN

## 2024-08-21 SDOH — ECONOMIC STABILITY: INCOME INSECURITY: HOW HARD IS IT FOR YOU TO PAY FOR THE VERY BASICS LIKE FOOD, HOUSING, MEDICAL CARE, AND HEATING?: SOMEWHAT HARD

## 2024-08-21 SDOH — ECONOMIC STABILITY: FOOD INSECURITY: WITHIN THE PAST 12 MONTHS, YOU WORRIED THAT YOUR FOOD WOULD RUN OUT BEFORE YOU GOT MONEY TO BUY MORE.: NEVER TRUE

## 2024-08-21 SDOH — HEALTH STABILITY: MENTAL HEALTH
STRESS IS WHEN SOMEONE FEELS TENSE, NERVOUS, ANXIOUS, OR CAN'T SLEEP AT NIGHT BECAUSE THEIR MIND IS TROUBLED. HOW STRESSED ARE YOU?: NOT AT ALL

## 2024-08-21 ASSESSMENT — LIFESTYLE VARIABLES
HOW OFTEN DO YOU HAVE SIX OR MORE DRINKS ON ONE OCCASION: NEVER
HOW OFTEN DO YOU HAVE A DRINK CONTAINING ALCOHOL: 2-3 TIMES A WEEK
SKIP TO QUESTIONS 9-10: 1
HOW MANY STANDARD DRINKS CONTAINING ALCOHOL DO YOU HAVE ON A TYPICAL DAY: 1 OR 2
AUDIT-C TOTAL SCORE: 3

## 2024-08-21 ASSESSMENT — SOCIAL DETERMINANTS OF HEALTH (SDOH)
HOW OFTEN DO YOU HAVE SIX OR MORE DRINKS ON ONE OCCASION: NEVER
HOW OFTEN DO YOU ATTEND CHURCH OR RELIGIOUS SERVICES?: 1 TO 4 TIMES PER YEAR
HOW OFTEN DO YOU GET TOGETHER WITH FRIENDS OR RELATIVES?: TWICE A WEEK
IN A TYPICAL WEEK, HOW MANY TIMES DO YOU TALK ON THE PHONE WITH FAMILY, FRIENDS, OR NEIGHBORS?: MORE THAN THREE TIMES A WEEK
HOW HARD IS IT FOR YOU TO PAY FOR THE VERY BASICS LIKE FOOD, HOUSING, MEDICAL CARE, AND HEATING?: SOMEWHAT HARD
HOW OFTEN DO YOU HAVE A DRINK CONTAINING ALCOHOL: 2-3 TIMES A WEEK
IN THE PAST 12 MONTHS, HAS THE ELECTRIC, GAS, OIL, OR WATER COMPANY THREATENED TO SHUT OFF SERVICE IN YOUR HOME?: NO
HOW OFTEN DO YOU ATTENT MEETINGS OF THE CLUB OR ORGANIZATION YOU BELONG TO?: 1 TO 4 TIMES PER YEAR
HOW OFTEN DO YOU ATTENT MEETINGS OF THE CLUB OR ORGANIZATION YOU BELONG TO?: 1 TO 4 TIMES PER YEAR
IN A TYPICAL WEEK, HOW MANY TIMES DO YOU TALK ON THE PHONE WITH FAMILY, FRIENDS, OR NEIGHBORS?: MORE THAN THREE TIMES A WEEK
DO YOU BELONG TO ANY CLUBS OR ORGANIZATIONS SUCH AS CHURCH GROUPS UNIONS, FRATERNAL OR ATHLETIC GROUPS, OR SCHOOL GROUPS?: YES
HOW OFTEN DO YOU ATTEND CHURCH OR RELIGIOUS SERVICES?: 1 TO 4 TIMES PER YEAR
HOW OFTEN DO YOU GET TOGETHER WITH FRIENDS OR RELATIVES?: TWICE A WEEK
HOW MANY DRINKS CONTAINING ALCOHOL DO YOU HAVE ON A TYPICAL DAY WHEN YOU ARE DRINKING: 1 OR 2
WITHIN THE PAST 12 MONTHS, YOU WORRIED THAT YOUR FOOD WOULD RUN OUT BEFORE YOU GOT THE MONEY TO BUY MORE: NEVER TRUE
DO YOU BELONG TO ANY CLUBS OR ORGANIZATIONS SUCH AS CHURCH GROUPS UNIONS, FRATERNAL OR ATHLETIC GROUPS, OR SCHOOL GROUPS?: YES

## 2024-08-21 ASSESSMENT — FIBROSIS 4 INDEX: FIB4 SCORE: 1.11

## 2024-08-21 NOTE — PROGRESS NOTES
Chief Complaint   Patient presents with    Annual Exam     Verbal consent was acquired by the patient to use [employer] ambient listening note generation during this visit Yes     History of Present Illness  The patient is a 54-year-old established female who presents for Pap/gynecological exam.  She is up-to-date with her mammogram and colonoscopy.  She is due for some repeat blood work which she plans on doing in the next week.  She has never had an abnormal Pap smear.  Her last Pap smear was about 3 years ago.  She is menopausal, she had a tubal ligation when she was 39 and went through menopause/never had a menses again.  She did take her meds for a few years.  Her on HRT.  She is sexually active with her  in a monogamous relationship.  She is also followed by GI regularly for gastric polyps and plans on returning to GI next month with her scheduled appointment.  She stays on Nexium.    She is also taking rosuvastatin 20 mg daily for her cholesterol levels.    She has a history of sinus issues, having experienced 4 to 5 sinus infections per year throughout her life. She underwent sinus surgery with Dr. Madrid and is currently receiving Nucala injections once a month, which have improved her condition by 80 percent. She also uses Astelin and Flonase nasal sprays, and performs nasal irrigation with a neti pot every night.      Supplemental Information:  She takes Valtrex as needed for cold sores. She has not had any cold sores for 25 years.  She has a noncancerous cyst to her lateral left thigh and is having this removed by orthopedics in October, Dr. Madsen    meds:   Current Outpatient Medications   Medication Sig Dispense Refill    esomeprazole (NEXIUM) 20 MG capsule Take 1 Capsule by mouth every 48 hours. 30 Capsule 4    Semaglutide (RYBELSUS) 14 MG Tab Take 14 mg by mouth every day. 30 Tablet 2    NUCALA 100 MG/ML Solution Prefilled Syringe 100 mg every 30 (thirty) days.      tizanidine (ZANAFLEX) 4  MG Tab TAKE 1 TABLET BY MOUTH AT BEDTIME AS NEEDED (JOINT PAIN / MUSCLE TIGHTNESS). 90 Tablet 0    rosuvastatin (CRESTOR) 20 MG Tab Take 1 Tablet by mouth every evening. 30 Tablet 11    azelastine (ASTELIN) 137 MCG/SPRAY nasal spray SPRAY 1 SPRAY IN EACH NOSTRIL 30 mL 5    EPINEPHrine (EPIPEN) 0.3 MG/0.3ML Solution Auto-injector solution for injection Inject 0.3 mL into the thigh one time for 1 dose 1 Each 4    fluticasone (FLONASE) 50 MCG/ACT nasal spray USE 1 SPRAYS IN EACH NOSTRIL DAILY-GENERIC FOR FLONASE (60 days) 16 g 4    valacyclovir (VALTREX) 1 GM Tab Take 1 Tab by mouth 2 times a day. 180 Tab 3     No current facility-administered medications for this visit.       Allergies: No Known Allergies    family:   Family History   Problem Relation Age of Onset    Cancer Father         prostate    Hypertension Father     Arthritis Father     Prostate cancer Father     Cancer Sister         melanoma    Cancer Paternal Aunt         breast    Diabetes Other     Sleep Apnea Neg Hx        social hx:   Social History     Socioeconomic History    Marital status:      Spouse name: Not on file    Number of children: Not on file    Years of education: Not on file    Highest education level: Master's degree (e.g., MA, MS, Ninfa, MEd, MSW, LAURA)   Occupational History    Not on file   Tobacco Use    Smoking status: Never    Smokeless tobacco: Never   Vaping Use    Vaping status: Never Used   Substance and Sexual Activity    Alcohol use: Not Currently     Comment: 4 per week    Drug use: No    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: , 2 children, teacher   Other Topics Concern    Not on file   Social History Narrative    Not on file     Social Determinants of Health     Financial Resource Strain: Medium Risk (8/21/2024)    Overall Financial Resource Strain (CARDIA)     Difficulty of Paying Living Expenses: Somewhat hard   Food Insecurity: No Food Insecurity (8/21/2024)    Hunger Vital Sign     Worried About  "Running Out of Food in the Last Year: Never true     Ran Out of Food in the Last Year: Never true   Transportation Needs: No Transportation Needs (8/21/2024)    PRAPARE - Transportation     Lack of Transportation (Medical): No     Lack of Transportation (Non-Medical): No   Physical Activity: Sufficiently Active (8/21/2024)    Exercise Vital Sign     Days of Exercise per Week: 5 days     Minutes of Exercise per Session: 60 min   Stress: No Stress Concern Present (8/21/2024)    [employer] [place] [employer] - Occupational Stress Questionnaire     Feeling of Stress : Not at all   Social Connections: Socially Integrated (8/21/2024)    Social Connection and Isolation Panel [NHANES]     Frequency of Communication with Friends and Family: More than three times a week     Frequency of Social Gatherings with Friends and Family: Twice a week     Attends Jehovah's witness Services: 1 to 4 times per year     Active Member of Clubs or Organizations: Yes     Attends Club or Organization Meetings: 1 to 4 times per year     Marital Status:    Intimate Partner Violence: Not on file   Housing Stability: Low Risk  (8/21/2024)    Housing Stability Vital Sign     Unable to Pay for Housing in the Last Year: No     Number of Times Moved in the Last Year: 0     Homeless in the Last Year: No       PHYSICAL EXAMINATION:  /70 (BP Location: Right arm, Patient Position: Sitting, BP Cuff Size: Large adult)   Pulse 68   Temp (!) 35.8 °C (96.4 °F)   Resp 12   Ht 1.702 m (5' 7\")   Wt 87.5 kg (193 lb)   SpO2 97%   General appearance:healthy, well developed, well nourished  Psych: alert, no distress, cooperative  Eyes: EOM's normal, pupils equal, round, reactive to light  ENT: Ears: external ears normal to inspection and palpation, TM's clear, Nose/Sinuses: nose shows no deformity, asymmetry, or inflammation  Neck: no asymmetry, masses, or scars, no adenopathy, thyroid normal to palpation  Lungs:chest symmetric with normal A/P " diameter, no chest deformities noted, normal respiratory rate and rhythm  Cardiovascular:regular rate and rhythm, S1 normal  Breasts: normal in size and symmetry, skin normal, physiologic fibronodularity  Abdomen: umbilicus normal, no masses palpable, no organomegaly  Musculoskeletal:ROM of all joints is normal, no evidence of joint instability  Lymphatic: None significantly enlarged  Skin: no rash, no edema  Neuro: mental status intact, cranial nerves 2-12 intact  Pelvic: external genitalia normal, cervix normal in appearance, bimanual exam reveals normal uterus, adnexa without masses or tenderness, vaginal mucosa normal      ASSESSMENT/PLAN:  1.annual physical exam: HCM:  Pap and breast exams done.  BSE technique reviewed and patient encouraged to perform self-exam monthly.   Encouraged continued daily physical exercise  Recommend mammogram every 1 to 2 years.  Colonoscopy up-to-date.  Reviewed last endoscopy.  Has a follow-up with GI in 1 month.  Recommend 1500 mg Calcium with 600 units vit d daily.    Pap q 5 yrs if today's is normal.   Anticipatory guidance:  Encouraged daily physical exercise, high fiber / vegetable based diet, 8 hours of sleep at night, skin protection from sun with suncreen / clothing, yearly derm consults.   Updated hepatitis B #3 today.  Encouraged COVID and influenza vaccines in the fall.  Immunization counseling done.

## 2024-08-26 LAB
COMMENT NL11729A: NORMAL
CYTOLOGIST CVX/VAG CYTO: NORMAL
CYTOLOGY CVX/VAG DOC CYTO: NORMAL
CYTOLOGY CVX/VAG DOC THIN PREP: NORMAL
NOTE NL11727A: NORMAL
OTHER STN SPEC: NORMAL
STAT OF ADQ CVX/VAG CYTO-IMP: NORMAL

## 2024-08-29 ENCOUNTER — HOSPITAL ENCOUNTER (OUTPATIENT)
Dept: LAB | Facility: MEDICAL CENTER | Age: 54
End: 2024-08-29
Attending: NURSE PRACTITIONER
Payer: COMMERCIAL

## 2024-08-29 DIAGNOSIS — R73.01 IMPAIRED FASTING GLUCOSE: ICD-10-CM

## 2024-08-29 LAB
ALBUMIN SERPL BCP-MCNC: 4.6 G/DL (ref 3.2–4.9)
ALBUMIN/GLOB SERPL: 1.7 G/DL
ALP SERPL-CCNC: 83 U/L (ref 30–99)
ALT SERPL-CCNC: 54 U/L (ref 2–50)
ANION GAP SERPL CALC-SCNC: 12 MMOL/L (ref 7–16)
AST SERPL-CCNC: 50 U/L (ref 12–45)
BILIRUB SERPL-MCNC: 0.5 MG/DL (ref 0.1–1.5)
BUN SERPL-MCNC: 13 MG/DL (ref 8–22)
CALCIUM ALBUM COR SERPL-MCNC: 9.2 MG/DL (ref 8.5–10.5)
CALCIUM SERPL-MCNC: 9.7 MG/DL (ref 8.5–10.5)
CHLORIDE SERPL-SCNC: 106 MMOL/L (ref 96–112)
CHOLEST SERPL-MCNC: 151 MG/DL (ref 100–199)
CO2 SERPL-SCNC: 22 MMOL/L (ref 20–33)
CREAT SERPL-MCNC: 0.72 MG/DL (ref 0.5–1.4)
EST. AVERAGE GLUCOSE BLD GHB EST-MCNC: 114 MG/DL
GFR SERPLBLD CREATININE-BSD FMLA CKD-EPI: 98 ML/MIN/1.73 M 2
GLOBULIN SER CALC-MCNC: 2.7 G/DL (ref 1.9–3.5)
GLUCOSE SERPL-MCNC: 109 MG/DL (ref 65–99)
HBA1C MFR BLD: 5.6 % (ref 4–5.6)
HDLC SERPL-MCNC: 59 MG/DL
LDLC SERPL CALC-MCNC: 66 MG/DL
POTASSIUM SERPL-SCNC: 4.2 MMOL/L (ref 3.6–5.5)
PROT SERPL-MCNC: 7.3 G/DL (ref 6–8.2)
SODIUM SERPL-SCNC: 140 MMOL/L (ref 135–145)
TRIGL SERPL-MCNC: 129 MG/DL (ref 0–149)

## 2024-08-29 PROCEDURE — 36415 COLL VENOUS BLD VENIPUNCTURE: CPT

## 2024-08-29 PROCEDURE — 83036 HEMOGLOBIN GLYCOSYLATED A1C: CPT

## 2024-08-29 PROCEDURE — 80061 LIPID PANEL: CPT

## 2024-08-29 PROCEDURE — 80053 COMPREHEN METABOLIC PANEL: CPT

## 2024-09-03 DIAGNOSIS — R74.8 ELEVATED LIVER ENZYMES: ICD-10-CM

## 2024-09-16 ENCOUNTER — APPOINTMENT (OUTPATIENT)
Dept: ADMISSIONS | Facility: MEDICAL CENTER | Age: 54
End: 2024-09-16
Attending: ORTHOPAEDIC SURGERY
Payer: COMMERCIAL

## 2024-09-23 ENCOUNTER — PRE-ADMISSION TESTING (OUTPATIENT)
Dept: ADMISSIONS | Facility: MEDICAL CENTER | Age: 54
End: 2024-09-23
Attending: ORTHOPAEDIC SURGERY
Payer: COMMERCIAL

## 2024-10-01 ENCOUNTER — PATIENT MESSAGE (OUTPATIENT)
Dept: MEDICAL GROUP | Facility: LAB | Age: 54
End: 2024-10-01
Payer: COMMERCIAL

## 2024-10-09 ENCOUNTER — ANESTHESIA (OUTPATIENT)
Dept: SURGERY | Facility: MEDICAL CENTER | Age: 54
End: 2024-10-09
Payer: COMMERCIAL

## 2024-10-09 ENCOUNTER — APPOINTMENT (OUTPATIENT)
Dept: RADIOLOGY | Facility: MEDICAL CENTER | Age: 54
End: 2024-10-09
Attending: ORTHOPAEDIC SURGERY
Payer: COMMERCIAL

## 2024-10-09 ENCOUNTER — PHARMACY VISIT (OUTPATIENT)
Dept: PHARMACY | Facility: MEDICAL CENTER | Age: 54
End: 2024-10-09
Payer: COMMERCIAL

## 2024-10-09 ENCOUNTER — HOSPITAL ENCOUNTER (OUTPATIENT)
Facility: MEDICAL CENTER | Age: 54
End: 2024-10-09
Attending: ORTHOPAEDIC SURGERY | Admitting: ORTHOPAEDIC SURGERY
Payer: COMMERCIAL

## 2024-10-09 ENCOUNTER — ANESTHESIA EVENT (OUTPATIENT)
Dept: SURGERY | Facility: MEDICAL CENTER | Age: 54
End: 2024-10-09
Payer: COMMERCIAL

## 2024-10-09 VITALS
BODY MASS INDEX: 31.45 KG/M2 | OXYGEN SATURATION: 96 % | DIASTOLIC BLOOD PRESSURE: 78 MMHG | RESPIRATION RATE: 14 BRPM | HEART RATE: 76 BPM | WEIGHT: 200.4 LBS | TEMPERATURE: 96.8 F | HEIGHT: 67 IN | SYSTOLIC BLOOD PRESSURE: 123 MMHG

## 2024-10-09 DIAGNOSIS — Z98.890 STATUS POST TENDON REPAIR: ICD-10-CM

## 2024-10-09 DIAGNOSIS — G89.18 POST-OP PAIN: ICD-10-CM

## 2024-10-09 PROBLEM — M76.02 GLUTEAL TENDINITIS, LEFT HIP: Status: ACTIVE | Noted: 2024-10-09

## 2024-10-09 PROBLEM — M79.89 SOFT TISSUE MASS: Status: ACTIVE | Noted: 2024-10-09

## 2024-10-09 LAB
GRAM STN SPEC: NORMAL
PATHOLOGY CONSULT NOTE: NORMAL
SIGNIFICANT IND 70042: NORMAL
SITE SITE: NORMAL
SOURCE SOURCE: NORMAL

## 2024-10-09 PROCEDURE — 700111 HCHG RX REV CODE 636 W/ 250 OVERRIDE (IP): Performed by: STUDENT IN AN ORGANIZED HEALTH CARE EDUCATION/TRAINING PROGRAM

## 2024-10-09 PROCEDURE — 87102 FUNGUS ISOLATION CULTURE: CPT

## 2024-10-09 PROCEDURE — RXMED WILLOW AMBULATORY MEDICATION CHARGE: Performed by: ORTHOPAEDIC SURGERY

## 2024-10-09 PROCEDURE — 160035 HCHG PACU - 1ST 60 MINS PHASE I: Performed by: ORTHOPAEDIC SURGERY

## 2024-10-09 PROCEDURE — 73502 X-RAY EXAM HIP UNI 2-3 VIEWS: CPT | Mod: LT

## 2024-10-09 PROCEDURE — 700101 HCHG RX REV CODE 250: Performed by: ORTHOPAEDIC SURGERY

## 2024-10-09 PROCEDURE — 27299 UNLISTED PX PELVIS/HIP JOINT: CPT | Mod: LT | Performed by: ORTHOPAEDIC SURGERY

## 2024-10-09 PROCEDURE — 160002 HCHG RECOVERY MINUTES (STAT): Performed by: ORTHOPAEDIC SURGERY

## 2024-10-09 PROCEDURE — 700102 HCHG RX REV CODE 250 W/ 637 OVERRIDE(OP): Performed by: STUDENT IN AN ORGANIZED HEALTH CARE EDUCATION/TRAINING PROGRAM

## 2024-10-09 PROCEDURE — 160048 HCHG OR STATISTICAL LEVEL 1-5: Performed by: ORTHOPAEDIC SURGERY

## 2024-10-09 PROCEDURE — 160039 HCHG SURGERY MINUTES - EA ADDL 1 MIN LEVEL 3: Performed by: ORTHOPAEDIC SURGERY

## 2024-10-09 PROCEDURE — 700101 HCHG RX REV CODE 250: Performed by: STUDENT IN AN ORGANIZED HEALTH CARE EDUCATION/TRAINING PROGRAM

## 2024-10-09 PROCEDURE — 160028 HCHG SURGERY MINUTES - 1ST 30 MINS LEVEL 3: Performed by: ORTHOPAEDIC SURGERY

## 2024-10-09 PROCEDURE — 160046 HCHG PACU - 1ST 60 MINS PHASE II: Performed by: ORTHOPAEDIC SURGERY

## 2024-10-09 PROCEDURE — 87075 CULTR BACTERIA EXCEPT BLOOD: CPT

## 2024-10-09 PROCEDURE — 160009 HCHG ANES TIME/MIN: Performed by: ORTHOPAEDIC SURGERY

## 2024-10-09 PROCEDURE — 88304 TISSUE EXAM BY PATHOLOGIST: CPT

## 2024-10-09 PROCEDURE — 87070 CULTURE OTHR SPECIMN AEROBIC: CPT

## 2024-10-09 PROCEDURE — 700105 HCHG RX REV CODE 258: Performed by: ORTHOPAEDIC SURGERY

## 2024-10-09 PROCEDURE — A9270 NON-COVERED ITEM OR SERVICE: HCPCS | Performed by: STUDENT IN AN ORGANIZED HEALTH CARE EDUCATION/TRAINING PROGRAM

## 2024-10-09 PROCEDURE — 27048 EXC HIP/PELV TUM DEEP < 5 CM: CPT | Mod: LT | Performed by: ORTHOPAEDIC SURGERY

## 2024-10-09 PROCEDURE — 87205 SMEAR GRAM STAIN: CPT

## 2024-10-09 PROCEDURE — 160025 RECOVERY II MINUTES (STATS): Performed by: ORTHOPAEDIC SURGERY

## 2024-10-09 PROCEDURE — 160036 HCHG PACU - EA ADDL 30 MINS PHASE I: Performed by: ORTHOPAEDIC SURGERY

## 2024-10-09 RX ORDER — DIPHENHYDRAMINE HYDROCHLORIDE 50 MG/ML
12.5 INJECTION INTRAMUSCULAR; INTRAVENOUS
Status: DISCONTINUED | OUTPATIENT
Start: 2024-10-09 | End: 2024-10-09 | Stop reason: HOSPADM

## 2024-10-09 RX ORDER — OXYCODONE HCL 5 MG/5 ML
10 SOLUTION, ORAL ORAL
Status: COMPLETED | OUTPATIENT
Start: 2024-10-09 | End: 2024-10-09

## 2024-10-09 RX ORDER — EPHEDRINE SULFATE 50 MG/ML
INJECTION, SOLUTION INTRAVENOUS PRN
Status: DISCONTINUED | OUTPATIENT
Start: 2024-10-09 | End: 2024-10-09 | Stop reason: SURG

## 2024-10-09 RX ORDER — SODIUM CHLORIDE, SODIUM LACTATE, POTASSIUM CHLORIDE, CALCIUM CHLORIDE 600; 310; 30; 20 MG/100ML; MG/100ML; MG/100ML; MG/100ML
INJECTION, SOLUTION INTRAVENOUS CONTINUOUS
Status: DISCONTINUED | OUTPATIENT
Start: 2024-10-09 | End: 2024-10-09 | Stop reason: HOSPADM

## 2024-10-09 RX ORDER — OXYCODONE HCL 5 MG/5 ML
5 SOLUTION, ORAL ORAL
Status: COMPLETED | OUTPATIENT
Start: 2024-10-09 | End: 2024-10-09

## 2024-10-09 RX ORDER — LIDOCAINE HYDROCHLORIDE 20 MG/ML
INJECTION, SOLUTION EPIDURAL; INFILTRATION; INTRACAUDAL; PERINEURAL PRN
Status: DISCONTINUED | OUTPATIENT
Start: 2024-10-09 | End: 2024-10-09 | Stop reason: SURG

## 2024-10-09 RX ORDER — EPHEDRINE SULFATE 50 MG/ML
5 INJECTION, SOLUTION INTRAVENOUS
Status: DISCONTINUED | OUTPATIENT
Start: 2024-10-09 | End: 2024-10-09 | Stop reason: HOSPADM

## 2024-10-09 RX ORDER — HYDROMORPHONE HYDROCHLORIDE 2 MG/ML
INJECTION, SOLUTION INTRAMUSCULAR; INTRAVENOUS; SUBCUTANEOUS PRN
Status: DISCONTINUED | OUTPATIENT
Start: 2024-10-09 | End: 2024-10-09 | Stop reason: SURG

## 2024-10-09 RX ORDER — CEFAZOLIN SODIUM 1 G/3ML
INJECTION, POWDER, FOR SOLUTION INTRAMUSCULAR; INTRAVENOUS PRN
Status: DISCONTINUED | OUTPATIENT
Start: 2024-10-09 | End: 2024-10-09 | Stop reason: SURG

## 2024-10-09 RX ORDER — OXYCODONE HYDROCHLORIDE 5 MG/1
5 TABLET ORAL EVERY 4 HOURS PRN
Qty: 20 TABLET | Refills: 0 | Status: SHIPPED | OUTPATIENT
Start: 2024-10-09 | End: 2024-10-16

## 2024-10-09 RX ORDER — ALBUTEROL SULFATE 5 MG/ML
2.5 SOLUTION RESPIRATORY (INHALATION)
Status: DISCONTINUED | OUTPATIENT
Start: 2024-10-09 | End: 2024-10-09 | Stop reason: HOSPADM

## 2024-10-09 RX ORDER — HYDROMORPHONE HYDROCHLORIDE 1 MG/ML
0.1 INJECTION, SOLUTION INTRAMUSCULAR; INTRAVENOUS; SUBCUTANEOUS
Status: DISCONTINUED | OUTPATIENT
Start: 2024-10-09 | End: 2024-10-09 | Stop reason: HOSPADM

## 2024-10-09 RX ORDER — HYDRALAZINE HYDROCHLORIDE 20 MG/ML
5 INJECTION INTRAMUSCULAR; INTRAVENOUS
Status: DISCONTINUED | OUTPATIENT
Start: 2024-10-09 | End: 2024-10-09 | Stop reason: HOSPADM

## 2024-10-09 RX ORDER — BUPIVACAINE HYDROCHLORIDE AND EPINEPHRINE 5; 5 MG/ML; UG/ML
INJECTION, SOLUTION EPIDURAL; INTRACAUDAL; PERINEURAL
Status: DISCONTINUED | OUTPATIENT
Start: 2024-10-09 | End: 2024-10-09 | Stop reason: HOSPADM

## 2024-10-09 RX ORDER — DEXMEDETOMIDINE HYDROCHLORIDE 100 UG/ML
INJECTION, SOLUTION INTRAVENOUS PRN
Status: DISCONTINUED | OUTPATIENT
Start: 2024-10-09 | End: 2024-10-09 | Stop reason: SURG

## 2024-10-09 RX ORDER — HYDROMORPHONE HYDROCHLORIDE 1 MG/ML
0.2 INJECTION, SOLUTION INTRAMUSCULAR; INTRAVENOUS; SUBCUTANEOUS
Status: DISCONTINUED | OUTPATIENT
Start: 2024-10-09 | End: 2024-10-09 | Stop reason: HOSPADM

## 2024-10-09 RX ORDER — MIDAZOLAM HYDROCHLORIDE 1 MG/ML
INJECTION INTRAMUSCULAR; INTRAVENOUS PRN
Status: DISCONTINUED | OUTPATIENT
Start: 2024-10-09 | End: 2024-10-09 | Stop reason: SURG

## 2024-10-09 RX ORDER — HYDROMORPHONE HYDROCHLORIDE 1 MG/ML
0.4 INJECTION, SOLUTION INTRAMUSCULAR; INTRAVENOUS; SUBCUTANEOUS
Status: DISCONTINUED | OUTPATIENT
Start: 2024-10-09 | End: 2024-10-09 | Stop reason: HOSPADM

## 2024-10-09 RX ORDER — HALOPERIDOL 5 MG/ML
1 INJECTION INTRAMUSCULAR
Status: DISCONTINUED | OUTPATIENT
Start: 2024-10-09 | End: 2024-10-09 | Stop reason: HOSPADM

## 2024-10-09 RX ORDER — ONDANSETRON 2 MG/ML
4 INJECTION INTRAMUSCULAR; INTRAVENOUS
Status: DISCONTINUED | OUTPATIENT
Start: 2024-10-09 | End: 2024-10-09 | Stop reason: HOSPADM

## 2024-10-09 RX ORDER — ONDANSETRON 2 MG/ML
INJECTION INTRAMUSCULAR; INTRAVENOUS PRN
Status: DISCONTINUED | OUTPATIENT
Start: 2024-10-09 | End: 2024-10-09 | Stop reason: SURG

## 2024-10-09 RX ORDER — DEXAMETHASONE SODIUM PHOSPHATE 4 MG/ML
INJECTION, SOLUTION INTRA-ARTICULAR; INTRALESIONAL; INTRAMUSCULAR; INTRAVENOUS; SOFT TISSUE PRN
Status: DISCONTINUED | OUTPATIENT
Start: 2024-10-09 | End: 2024-10-09 | Stop reason: SURG

## 2024-10-09 RX ORDER — ENOXAPARIN SODIUM 100 MG/ML
40 INJECTION SUBCUTANEOUS DAILY
Qty: 28 EACH | Refills: 0 | Status: SHIPPED | OUTPATIENT
Start: 2024-10-09 | End: 2024-11-06

## 2024-10-09 RX ORDER — LABETALOL HYDROCHLORIDE 5 MG/ML
5 INJECTION, SOLUTION INTRAVENOUS
Status: DISCONTINUED | OUTPATIENT
Start: 2024-10-09 | End: 2024-10-09 | Stop reason: HOSPADM

## 2024-10-09 RX ADMIN — CEFAZOLIN 2 G: 1 INJECTION, POWDER, FOR SOLUTION INTRAMUSCULAR; INTRAVENOUS at 10:37

## 2024-10-09 RX ADMIN — DEXMEDETOMIDINE HYDROCHLORIDE 12 MCG: 100 INJECTION, SOLUTION INTRAVENOUS at 10:29

## 2024-10-09 RX ADMIN — SUGAMMADEX 200 MG: 100 INJECTION, SOLUTION INTRAVENOUS at 10:45

## 2024-10-09 RX ADMIN — EPHEDRINE SULFATE 15 MG: 50 INJECTION, SOLUTION INTRAVENOUS at 10:54

## 2024-10-09 RX ADMIN — LIDOCAINE HYDROCHLORIDE 100 MG: 20 INJECTION, SOLUTION EPIDURAL; INFILTRATION; INTRACAUDAL at 10:29

## 2024-10-09 RX ADMIN — ROCURONIUM BROMIDE 50 MG: 10 INJECTION, SOLUTION INTRAVENOUS at 10:32

## 2024-10-09 RX ADMIN — HYDROMORPHONE HYDROCHLORIDE 0.5 MG: 2 INJECTION INTRAMUSCULAR; INTRAVENOUS; SUBCUTANEOUS at 11:09

## 2024-10-09 RX ADMIN — DEXAMETHASONE SODIUM PHOSPHATE 4 MG: 4 INJECTION INTRA-ARTICULAR; INTRALESIONAL; INTRAMUSCULAR; INTRAVENOUS; SOFT TISSUE at 10:37

## 2024-10-09 RX ADMIN — DEXMEDETOMIDINE HYDROCHLORIDE 8 MCG: 100 INJECTION, SOLUTION INTRAVENOUS at 10:37

## 2024-10-09 RX ADMIN — EPHEDRINE SULFATE 10 MG: 50 INJECTION, SOLUTION INTRAVENOUS at 11:23

## 2024-10-09 RX ADMIN — PROPOFOL 30 MCG/KG/MIN: 10 INJECTION, EMULSION INTRAVENOUS at 10:49

## 2024-10-09 RX ADMIN — SODIUM CHLORIDE, POTASSIUM CHLORIDE, SODIUM LACTATE AND CALCIUM CHLORIDE: 600; 310; 30; 20 INJECTION, SOLUTION INTRAVENOUS at 09:20

## 2024-10-09 RX ADMIN — LIDOCAINE HYDROCHLORIDE 100 MG: 20 INJECTION, SOLUTION EPIDURAL; INFILTRATION; INTRACAUDAL at 11:39

## 2024-10-09 RX ADMIN — HYDROMORPHONE HYDROCHLORIDE 0.5 MG: 2 INJECTION INTRAMUSCULAR; INTRAVENOUS; SUBCUTANEOUS at 10:29

## 2024-10-09 RX ADMIN — PROPOFOL 200 MG: 10 INJECTION, EMULSION INTRAVENOUS at 10:30

## 2024-10-09 RX ADMIN — ONDANSETRON 4 MG: 2 INJECTION INTRAMUSCULAR; INTRAVENOUS at 11:19

## 2024-10-09 RX ADMIN — OXYCODONE HYDROCHLORIDE 10 MG: 5 SOLUTION ORAL at 12:25

## 2024-10-09 RX ADMIN — MIDAZOLAM HYDROCHLORIDE 2 MG: 2 INJECTION, SOLUTION INTRAMUSCULAR; INTRAVENOUS at 10:26

## 2024-10-09 ASSESSMENT — PAIN DESCRIPTION - PAIN TYPE
TYPE: ACUTE PAIN;SURGICAL PAIN
TYPE: SURGICAL PAIN
TYPE: ACUTE PAIN;SURGICAL PAIN

## 2024-10-09 ASSESSMENT — ENCOUNTER SYMPTOMS
FEVER: 0
MYALGIAS: 1
SHORTNESS OF BREATH: 0
WEAKNESS: 0
CHILLS: 0
SENSORY CHANGE: 0

## 2024-10-09 ASSESSMENT — FIBROSIS 4 INDEX: FIB4 SCORE: 1.37

## 2024-10-10 LAB
FUNGUS SPEC FUNGUS STN: NORMAL
SIGNIFICANT IND 70042: NORMAL
SITE SITE: NORMAL
SOURCE SOURCE: NORMAL

## 2024-10-12 LAB
BACTERIA WND AEROBE CULT: NORMAL
GRAM STN SPEC: NORMAL
SIGNIFICANT IND 70042: NORMAL
SITE SITE: NORMAL
SOURCE SOURCE: NORMAL

## 2024-10-14 LAB
BACTERIA SPEC ANAEROBE CULT: NORMAL
SIGNIFICANT IND 70042: NORMAL
SITE SITE: NORMAL
SOURCE SOURCE: NORMAL

## 2024-10-15 LAB
FUNGUS SPEC CULT: NORMAL
FUNGUS SPEC FUNGUS STN: NORMAL
SIGNIFICANT IND 70042: NORMAL
SITE SITE: NORMAL
SOURCE SOURCE: NORMAL

## 2024-10-28 ENCOUNTER — OFFICE VISIT (OUTPATIENT)
Dept: SURGICAL ONCOLOGY | Facility: MEDICAL CENTER | Age: 54
End: 2024-10-28
Payer: COMMERCIAL

## 2024-10-28 VITALS
TEMPERATURE: 97.2 F | OXYGEN SATURATION: 98 % | DIASTOLIC BLOOD PRESSURE: 72 MMHG | BODY MASS INDEX: 31.55 KG/M2 | HEIGHT: 67 IN | HEART RATE: 65 BPM | SYSTOLIC BLOOD PRESSURE: 132 MMHG | WEIGHT: 201 LBS

## 2024-10-28 DIAGNOSIS — M79.89 SOFT TISSUE MASS: ICD-10-CM

## 2024-10-28 PROCEDURE — 3078F DIAST BP <80 MM HG: CPT | Performed by: ORTHOPAEDIC SURGERY

## 2024-10-28 PROCEDURE — 3075F SYST BP GE 130 - 139MM HG: CPT | Performed by: ORTHOPAEDIC SURGERY

## 2024-10-28 PROCEDURE — 99024 POSTOP FOLLOW-UP VISIT: CPT | Performed by: ORTHOPAEDIC SURGERY

## 2024-10-28 ASSESSMENT — FIBROSIS 4 INDEX: FIB4 SCORE: 1.37

## 2024-10-29 ASSESSMENT — ENCOUNTER SYMPTOMS
FEVER: 0
SHORTNESS OF BREATH: 0
MYALGIAS: 0
SENSORY CHANGE: 0
WEAKNESS: 0
CHILLS: 0

## 2024-10-31 ENCOUNTER — APPOINTMENT (RX ONLY)
Dept: URBAN - METROPOLITAN AREA CLINIC 35 | Facility: CLINIC | Age: 54
Setting detail: DERMATOLOGY
End: 2024-10-31

## 2024-10-31 DIAGNOSIS — L82.0 INFLAMED SEBORRHEIC KERATOSIS: ICD-10-CM

## 2024-10-31 DIAGNOSIS — Z71.89 OTHER SPECIFIED COUNSELING: ICD-10-CM

## 2024-10-31 DIAGNOSIS — L82.1 OTHER SEBORRHEIC KERATOSIS: ICD-10-CM

## 2024-10-31 DIAGNOSIS — L81.4 OTHER MELANIN HYPERPIGMENTATION: ICD-10-CM

## 2024-10-31 DIAGNOSIS — D22 MELANOCYTIC NEVI: ICD-10-CM

## 2024-10-31 PROBLEM — D22.5 MELANOCYTIC NEVI OF TRUNK: Status: ACTIVE | Noted: 2024-10-31

## 2024-10-31 PROCEDURE — ? TREATMENT REGIMEN

## 2024-10-31 PROCEDURE — 99213 OFFICE O/P EST LOW 20 MIN: CPT | Mod: 25

## 2024-10-31 PROCEDURE — 17110 DESTRUCTION B9 LES UP TO 14: CPT

## 2024-10-31 PROCEDURE — ? LIQUID NITROGEN

## 2024-10-31 PROCEDURE — ? COUNSELING

## 2024-10-31 ASSESSMENT — LOCATION ZONE DERM
LOCATION ZONE: NECK
LOCATION ZONE: TRUNK

## 2024-10-31 ASSESSMENT — LOCATION SIMPLE DESCRIPTION DERM
LOCATION SIMPLE: ABDOMEN
LOCATION SIMPLE: RIGHT BREAST
LOCATION SIMPLE: RIGHT ANTERIOR NECK
LOCATION SIMPLE: RIGHT UPPER BACK
LOCATION SIMPLE: LEFT ANTERIOR NECK

## 2024-10-31 ASSESSMENT — LOCATION DETAILED DESCRIPTION DERM
LOCATION DETAILED: RIGHT INFRAMAMMARY CREASE (OUTER QUADRANT)
LOCATION DETAILED: RIGHT SUPERIOR UPPER BACK
LOCATION DETAILED: SUBXIPHOID
LOCATION DETAILED: LEFT INFERIOR LATERAL NECK
LOCATION DETAILED: RIGHT RIB CAGE
LOCATION DETAILED: RIGHT CLAVICULAR NECK
LOCATION DETAILED: LEFT CLAVICULAR NECK
LOCATION DETAILED: EPIGASTRIC SKIN
LOCATION DETAILED: RIGHT INFERIOR UPPER BACK
LOCATION DETAILED: LEFT RIB CAGE
LOCATION DETAILED: RIGHT MID-UPPER BACK

## 2024-10-31 NOTE — HPI: FULL BODY SKIN EXAMINATION
How Severe Are Your Spot(S)?: mild
What Type Of Note Output Would You Prefer (Optional)?: Standard Output
What Is The Reason For Today's Visit?: Full Body Skin Examination
What Is The Reason For Today's Visit? (Being Monitored For X): concerning skin lesions on a periodic basis
Additional History: Patient has some SK’s she would like treated today.

## 2024-10-31 NOTE — PROCEDURE: LIQUID NITROGEN
Show Aperture Variable?: Yes
Number Of Freeze-Thaw Cycles: 2 freeze-thaw cycles
Application Tool (Optional): Cry-AC
Render Post-Care Instructions In Note?: no
Detail Level: Detailed
Medical Necessity Information: It is in your best interest to select a reason for this procedure from the list below. All of these items fulfill various CMS LCD requirements except the new and changing color options.
Medical Necessity Clause: This procedure was medically necessary because the lesions that were treated were:
Spray Paint Text: The liquid nitrogen was applied to the skin utilizing a spray paint frosting technique.
Post-Care Instructions: I reviewed with the patient in detail post-care instructions. Patient is to wear sunprotection, and avoid picking at any of the treated lesions. Pt may apply Vaseline to crusted or scabbing areas.
Duration Of Freeze Thaw-Cycle (Seconds): 10
Consent: The patient's consent was obtained including but not limited to risks of crusting, scabbing, blistering, scarring, darker or lighter pigmentary change, recurrence, incomplete removal and infection.

## 2024-11-15 ENCOUNTER — PATIENT MESSAGE (OUTPATIENT)
Dept: MEDICAL GROUP | Facility: LAB | Age: 54
End: 2024-11-15

## 2024-11-19 ENCOUNTER — TELEPHONE (OUTPATIENT)
Dept: MEDICAL GROUP | Facility: LAB | Age: 54
End: 2024-11-19
Payer: COMMERCIAL

## 2024-12-17 RX ORDER — TIRZEPATIDE 2.5 MG/.5ML
INJECTION, SOLUTION SUBCUTANEOUS
Qty: 3 ML | Refills: 2 | Status: SHIPPED | OUTPATIENT
Start: 2024-12-17

## 2025-01-06 RX ORDER — ROSUVASTATIN CALCIUM 20 MG/1
TABLET, COATED ORAL
Qty: 90 TABLET | Refills: 3 | Status: SHIPPED | OUTPATIENT
Start: 2025-01-06

## 2025-03-05 ENCOUNTER — OFFICE VISIT (OUTPATIENT)
Dept: SLEEP MEDICINE | Facility: MEDICAL CENTER | Age: 55
End: 2025-03-05
Attending: NURSE PRACTITIONER
Payer: COMMERCIAL

## 2025-03-05 VITALS
RESPIRATION RATE: 16 BRPM | SYSTOLIC BLOOD PRESSURE: 118 MMHG | HEIGHT: 67 IN | OXYGEN SATURATION: 98 % | DIASTOLIC BLOOD PRESSURE: 70 MMHG | WEIGHT: 180 LBS | HEART RATE: 65 BPM | BODY MASS INDEX: 28.25 KG/M2

## 2025-03-05 DIAGNOSIS — G47.31 CENTRAL SLEEP APNEA: ICD-10-CM

## 2025-03-05 PROCEDURE — 3074F SYST BP LT 130 MM HG: CPT | Performed by: PHYSICIAN ASSISTANT

## 2025-03-05 PROCEDURE — 99213 OFFICE O/P EST LOW 20 MIN: CPT | Performed by: PHYSICIAN ASSISTANT

## 2025-03-05 PROCEDURE — 3078F DIAST BP <80 MM HG: CPT | Performed by: PHYSICIAN ASSISTANT

## 2025-03-05 ASSESSMENT — ENCOUNTER SYMPTOMS
WHEEZING: 0
PALPITATIONS: 0
SHORTNESS OF BREATH: 0
SORE THROAT: 0
TREMORS: 0
SINUS PAIN: 0
CHILLS: 0
ORTHOPNEA: 0
FEVER: 0
SPUTUM PRODUCTION: 0
COUGH: 0
HEADACHES: 0
DIZZINESS: 0
ROS GI COMMENTS: NO DENTURES, NO MISSING TEETH, NO SWALLOWING ISSUES
INSOMNIA: 0
HEARTBURN: 1
WEIGHT LOSS: 0

## 2025-03-05 ASSESSMENT — FIBROSIS 4 INDEX: FIB4 SCORE: 1.39

## 2025-03-05 NOTE — PATIENT INSTRUCTIONS
"1-reviewed compliance  2-demonstrating continued use and benefit  3-sample nasal pillows ([employer]) for \"squealing\"  4-As a reminder use distilled water only in humidifier chamber.  Fresh fill daily.    5-Today we reviewed equipment cleaning  once weekly minimum  mask, tubing and water chamber  use dedicated container  use mild soap and water  SoClean or other ozone  are not recommended  white vinegar and water solution is no longer recommended  hang tubing to dry  mask sanitizing wipes are an option for use   6-Equipment replacement schedule : Nasal pillows 2 times per month, Head gear every 6 months, Tubing every 3 months, Ultra-fine filters 2 times per month, Humidifier chamber every 6 months  7-follow up in one year, sooner if needed    "

## 2025-03-05 NOTE — PROGRESS NOTES
"Chief Complaint   Patient presents with    Apnea     Last Office Visit 01/31/2024 with Dr. Mclain    PAP/O2/OAT: ASV, EPAP 7, PS 15/5          HPI:  Fartun Fuentes is a 55 y.o. year old female here today for follow-up on central sleep apnea.  Last seen in clinic 1/31/2024 by Dr. Olga Mclain.    Past Medical History: Central sleep apnea, anxiety, GERD, hyperlipidemia, gastric polyps, hypertrophy nasal turbinates, chronic pansinusitis, asthma, allergic rhinitis.    Vitals:  /70 (BP Location: Left arm, Patient Position: Sitting, BP Cuff Size: Adult)   Pulse 65   Resp 16   Ht 1.702 m (5' 7\")   Wt 81.6 kg (180 lb)   SpO2 98% BMI 28.19 kg/m².    Recent Imaging: None    Echocardiogram obtained 3/7/2023 demonstrating normal left ventricular chamber size, systolic and diastolic function.  Mild concentric left ventricular hypertrophy.  LVEF estimated 65-70%.  Normal right ventricular size and systolic function.  Trace tricuspid regurgitation, unable to estimate pulmonary artery pressure due to inadequate tricuspid regurgitant jet.    Currently using  Resmed Bi-PAP ASV @EPAP 7, pressure support 5-15 cm H20 pressure; compliance reviewed for 9 hours 2 minutes, days used 59/60, average daily usage 9 hours 2 minutes, 98% of days greater than or equal to 4 hours, mask leak at 7.4 LPM at 95th percentile, AHI 1.0 per hour.  See media for full report.    Device obtained 6/8/2022  DME provider Preferred  Mask interface nasal cushion    Polysomnogram split-night study obtained 12/7/2016 demonstrated very severe sleep apnea with overall AHI of 120.6 events per hour, primarily central apneic episodes, low O2 sat of 78% with sats less than or equal to 88 for 35.6% of recorded sleep time.  Patient was trialed on CPAP and auto CPAP with incomplete response to therapy.  Trial CPAP or consider dedicated BiPAP titration study given persistent number of central apneic episodes.    Dedicated titration study obtained " 1/4/2017 demonstrated minimal sleep fragmentation, no periodic limb movements noted.  Low O2 sats were noted at 71% with sats less than or equal to 89 for 32 minutes of recorded sleep time. Patient was trialed on BiPAP and ASV therapy.  Study demonstrated excellent response to ASV with recommendation for EPAP of 10 and pressure support of 5-15.    Sleep schedule goes to bed 10 PM, wakens 6 AM , and gets up during the night bathroom x 1   Symptoms denies day time somnolence and denies morning headache        Review of Systems   Constitutional:  Negative for chills, fever, malaise/fatigue and weight loss.   HENT:  Negative for congestion, hearing loss, nosebleeds, sinus pain, sore throat and tinnitus.    Eyes:         Presc glasses   Respiratory:  Negative for cough, sputum production, shortness of breath and wheezing.    Cardiovascular:  Negative for chest pain, palpitations, orthopnea and leg swelling.   Gastrointestinal:  Positive for heartburn (controlled on meds).        No dentures, no missing teeth, no swallowing issues     Neurological:  Negative for dizziness, tremors and headaches.   Psychiatric/Behavioral:  The patient does not have insomnia.        Past Medical History:   Diagnosis Date    Allergic rhinitis     Allergy     Anxiety     Chickenpox     Heart burn     High cholesterol     Influenza     Psychiatric problem     depression    Sleep apnea     bipap    Snoring     sleep apnea questionairre completed    Tubal ligation        Past Surgical History:   Procedure Laterality Date    PB EXCIS TENDON SHEATH LESION, HAND/FINGER Left 10/9/2024    Procedure: EXCISION OF LEFT HIP SOFT TISSUE MASS, REPAIR OF LEFT GLUTEUS VIVIAN TENDON;  Surgeon: Martita Madsen D.O.;  Location: SURGERY [facility];  Service: Orthopedics    TENDON REPAIR Left 10/9/2024    Procedure: REPAIR, TENDON;  Surgeon: Martita Madsen D.O.;  Location: SURGERY [facility];  Service: Orthopedics    GASTROSCOPY-ENDO  12/10/2019     Procedure: GASTROSCOPY-POSSIBLE BIOPSY, DILATION, POLYPECTOMY, CONTROL OF HEMORRHAGE;  Surgeon: Pb Abdi M.D.;  Location: SURGERY [facility];  Service: Gastroenterology    BUNIONECTOMY  05/18/2011    Performed by MOLLY GARCIA at [facility]    CHEILECTOMY  05/18/2011    Performed by MOLLY GARCIA at [facility]    BONE SPUR EXCISION  05/18/2011    Performed by MOLLY GARCIA at [facility]    ESWT  05/18/2011    Performed by MOLLY GARCIA at [facility]    ABDOMINOPLASTY  2009    TUBAL COAGULATION LAPAROSCOPIC BILATERAL  2007    APPENDECTOMY  03/2003    ORIF, FOOT  1988    left    TONSILLECTOMY  1981    ANKLE ARTHROSCOPY Left     [facility] - mid june 2023    OTHER      Tummy tuck aproximately 2014    TOE ARTHROPLASTY Left     [facility] - mid June 2023       Family History   Problem Relation Age of Onset    Cancer Father         prostate    Hypertension Father     Arthritis Father     Prostate cancer Father     Cancer Sister         melanoma    Cancer Paternal Aunt         breast    Diabetes Other     Sleep Apnea Neg Hx        Social History     Socioeconomic History    Marital status:      Spouse name: Not on file    Number of children: Not on file    Years of education: Not on file    Highest education level: Master's degree (e.g., MA, MS, Ninfa, MEd, MSW, LAURA)   Occupational History    Not on file   Tobacco Use    Smoking status: Never    Smokeless tobacco: Never   Vaping Use    Vaping status: Never Used   Substance and Sexual Activity    Alcohol use: Yes     Alcohol/week: 0.6 - 1.8 oz     Types: 1 - 3 Standard drinks or equivalent per week     Comment: 4 per week    Drug use: No    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: , 2 children, teacher   Other Topics Concern    Not on file   Social History Narrative    Not on file     Social Drivers of Health     Financial Resource Strain: Medium Risk (8/21/2024)    Overall  Financial Resource Strain (CARDIA)     Difficulty of Paying Living Expenses: Somewhat hard   Food Insecurity: No Food Insecurity (8/21/2024)    Hunger Vital Sign     Worried About Running Out of Food in the Last Year: Never true     Ran Out of Food in the Last Year: Never true   Transportation Needs: No Transportation Needs (8/21/2024)    PRAPARE - Transportation     Lack of Transportation (Medical): No     Lack of Transportation (Non-Medical): No   Physical Activity: Sufficiently Active (8/21/2024)    Exercise Vital Sign     Days of Exercise per Week: 5 days     Minutes of Exercise per Session: 60 min   Stress: No Stress Concern Present (8/21/2024)    [employer] [place] [employer] - Occupational Stress Questionnaire     Feeling of Stress : Not at all   Social Connections: Socially Integrated (8/21/2024)    Social Connection and Isolation Panel [NHANES]     Frequency of Communication with Friends and Family: More than three times a week     Frequency of Social Gatherings with Friends and Family: Twice a week     Attends Synagogue Services: 1 to 4 times per year     Active Member of Clubs or Organizations: Yes     Attends Club or Organization Meetings: 1 to 4 times per year     Marital Status:    Intimate Partner Violence: Not on file   Housing Stability: Low Risk  (8/21/2024)    Housing Stability Vital Sign     Unable to Pay for Housing in the Last Year: No     Number of Times Moved in the Last Year: 0     Homeless in the Last Year: No       Allergies as of 03/05/2025 - Reviewed 10/28/2024   Allergen Reaction Noted    Asa [aspirin] Anaphylaxis 08/19/2009    Nsaids Anaphylaxis 03/23/2010    Sulfa drugs Anaphylaxis 07/07/2010    Augmentin Anaphylaxis 03/13/2019    Clindamycin Unspecified 04/07/2021    Contrast media with iodine [iodine] Itching 11/17/2014          Current medications as of today   Current Outpatient Medications   Medication Sig Dispense Refill    rosuvastatin (CRESTOR) 20 MG Tab TAKE  1 TABLET BY MOUTH EVERY EVENING (STOP ROSUVASTATIN 40 MG AND ATORVASTATIN 40 MG) 90 Tablet 3    Tirzepatide (MOUNJARO) 2.5 MG/0.5ML Solution Auto-injector Compounded tirzepatide 20 mg/ML. inject 0.75 mL/75 units / 15 mg once every 7 days subcutaneously. 3 mL 2    Tirzepatide 10 MG/0.5ML Solution Pen-injector Tirzepatide 20 mg/ml.  Inject 0.375 mL/37.5 units / 7.5 mg weekly for 4 weeks then increase to 0.5 mL/50 units / 10 mg for 4 weeks. 3 mL 2    Polyethylene Glycol 400 (BLINK TEARS OP) Administer 1 Drop into affected eye(s) 1 time a day as needed (dry eyes).      Cholecalciferol (VITAMIN D-3 PO) Take 1 Tablet by mouth every day.     MEDICATION INSTRUCTIONS FOR SURGERY/PROCEDURE SCHEDULED FOR 10-09-24.   DO NOT TAKE 7 DAYS PRIOR TO SURGERY      esomeprazole (NEXIUM) 20 MG capsule Take 1 Capsule by mouth every 48 hours. (Patient taking differently: Take 20 mg by mouth every 48 hours.     MEDICATION INSTRUCTIONS FOR SURGERY/PROCEDURE SCHEDULED FOR 10-09-24   OK TO CONTINUE TAKING PRIOR TO SURGERY AND DAY OF SURGERY) 30 Capsule 4    NUCALA 100 MG/ML Solution Prefilled Syringe 100 mg every 30 (thirty) days.     MEDICATION INSTRUCTIONS FOR SURGERY/PROCEDURE SCHEDULED FOR 10-09-24.   OK TO CONTINUE TAKING PRIOR TO SURGERY AND DAY OF SURGERY      tizanidine (ZANAFLEX) 4 MG Tab TAKE 1 TABLET BY MOUTH AT BEDTIME AS NEEDED (JOINT PAIN / MUSCLE TIGHTNESS). (Patient taking differently: Take 4 mg by mouth at bedtime as needed (joint pain / muscle tightness).     MEDICATION INSTRUCTIONS FOR SURGERY/PROCEDURE SCHEDULED FOR 10-09-24   OK TO CONTINUE TAKING PRIOR TO SURGERY AND DAY OF SURGERY) 90 Tablet 0    azelastine (ASTELIN) 137 MCG/SPRAY nasal spray SPRAY 1 SPRAY IN EACH NOSTRIL (Patient taking differently: No sig reported) 30 mL 5    EPINEPHrine (EPIPEN) 0.3 MG/0.3ML Solution Auto-injector solution for injection Inject 0.3 mL into the thigh one time for 1 dose 1 Each 4    fluticasone (FLONASE) 50 MCG/ACT nasal spray USE 1  SPRAYS IN EACH NOSTRIL DAILY-GENERIC FOR FLONASE (60 days) (Patient taking differently: Administer 1 Spray into affected nostril(S) every day. USE 1 SPRAYS IN EACH NOSTRIL DAILY-GENERIC FOR FLONASE (60 days)    MEDICATION INSTRUCTIONS FOR SURGERY/PROCEDURE SCHEDULED FOR 10-09-24   OK TO CONTINUE TAKING PRIOR TO SURGERY AND DAY OF SURGERY) 16 g 4    valacyclovir (VALTREX) 1 GM Tab Take 1 Tab by mouth 2 times a day. (Patient taking differently: Take 1,000 mg by mouth 2 times a day as needed (outbreak).) 180 Tab 3     No current facility-administered medications for this visit.         Physical Exam:   Gen:           Alert and oriented, No apparent distress. Mood and affect appropriate, normal interaction with examiner.   Hearing:     Grossly intact.  Nose:          Normal, no lesions or deformities.  Dentition:    Good dentition.   Oropharynx:   Tongue normal, posterior pharynx without erythema or exudate.  Mallampati Classification: II  Neck:        Supple, trachea midline, no masses.  Respiratory Effort: No intercostal retractions or use of accessory muscles.   Gait and Station: Normal.  Digits and Nails: No clubbing, cyanosis, petechiae, or nodes.   Skin:        No rashes, lesions or ulcers noted.               Ext:           No cyanosis or edema.      Immunizations:  Flu: 1/31/2024  Pneumovax 23: 9/6/2018  PCV 20: Recommended  Moderna SARS CoV2 Vaccine: 10/2/2021, 3/6/2021, 2/6/2021    Assessment / Plan:  1. Central sleep apnea  - DME Mask and Supplies    Reviewed compliance demonstrating continued use and benefit.  Reports intermittent mild squealing mask leak with nasal cushion.  Provided with sample of Nova micro nasal pillows to trial and assess for resolution.  Send updated order for mask and supplies to preferred.  Reminded to use distilled water only in humidifier chamber with fresh fill daily, reviewed equipment cleaning as well as equipment replacement schedule.  Follow-up in 1 year, sooner if  needed.        Follow-up:   Return in about 1 year (around 3/5/2026) for Return with Mercedes Oh PA-C.    Please note that this dictation was created using voice recognition software. I have made every reasonable attempt to correct obvious errors, but it is possible there are errors of grammar and possibly content that I did not discover before finalizing the note.

## 2025-04-17 ENCOUNTER — APPOINTMENT (OUTPATIENT)
Dept: MEDICAL GROUP | Facility: LAB | Age: 55
End: 2025-04-17
Payer: COMMERCIAL

## 2025-06-03 DIAGNOSIS — M25.50 MULTIPLE JOINT PAIN: ICD-10-CM

## 2025-06-12 ENCOUNTER — NON-PROVIDER VISIT (OUTPATIENT)
Dept: URGENT CARE | Facility: CLINIC | Age: 55
End: 2025-06-12

## 2025-06-12 DIAGNOSIS — Z02.1 PRE-EMPLOYMENT DRUG SCREENING: Primary | ICD-10-CM

## 2025-06-12 LAB
AMP AMPHETAMINE: NORMAL
COC COCAINE: NORMAL
INT CON NEG: NORMAL
INT CON POS: NORMAL
MET METHAMPHETAMINES: NORMAL
OPI OPIATES: NORMAL
PCP PHENCYCLIDINE: NORMAL
POC DRUG COMMENT 753798-OCCUPATIONAL HEALTH: NEGATIVE
THC: NORMAL

## 2025-06-12 PROCEDURE — 80305 DRUG TEST PRSMV DIR OPT OBS: CPT | Performed by: PHYSICIAN ASSISTANT

## 2025-06-12 NOTE — PROGRESS NOTES
Fartun Fuentes is a 55 y.o. female here for a non-provider visit for 6 panel uds for ccsd     If abnormal was an in office provider notified today (if so, indicate provider)? No    Routed to PCP? No

## 2025-06-30 ENCOUNTER — HOSPITAL ENCOUNTER (OUTPATIENT)
Dept: RADIOLOGY | Facility: MEDICAL CENTER | Age: 55
End: 2025-06-30
Attending: NURSE PRACTITIONER
Payer: COMMERCIAL

## 2025-06-30 DIAGNOSIS — Z12.31 VISIT FOR SCREENING MAMMOGRAM: ICD-10-CM

## 2025-06-30 PROCEDURE — 77063 BREAST TOMOSYNTHESIS BI: CPT

## 2025-06-30 PROCEDURE — 77063 BREAST TOMOSYNTHESIS BI: CPT | Performed by: STUDENT IN AN ORGANIZED HEALTH CARE EDUCATION/TRAINING PROGRAM

## 2025-07-01 ENCOUNTER — APPOINTMENT (OUTPATIENT)
Dept: ADMISSIONS | Facility: MEDICAL CENTER | Age: 55
End: 2025-07-01
Attending: OTOLARYNGOLOGY
Payer: COMMERCIAL

## 2025-07-03 ENCOUNTER — PRE-ADMISSION TESTING (OUTPATIENT)
Dept: ADMISSIONS | Facility: MEDICAL CENTER | Age: 55
End: 2025-07-03
Attending: OTOLARYNGOLOGY
Payer: COMMERCIAL

## 2025-07-03 VITALS — HEIGHT: 67 IN | BODY MASS INDEX: 28.19 KG/M2

## 2025-07-03 NOTE — PREPROCEDURE INSTRUCTIONS
Pt preadmitted via phone, instructions emailed. Questions answered. Med instructions per AVS note.Pt to bring BiPAP DOS.

## 2025-07-03 NOTE — PREADMIT AVS NOTE
Current Medications   Medication Instructions    tizanidine (ZANAFLEX) 4 MG Tab Continue until night before surgery    rosuvastatin (CRESTOR) 20 MG Tab Continue until night before surgery              Polyethylene Glycol 400 (BLINK TEARS OP) As needed medication, may take if needed, including morning of procedure     Cholecalciferol (VITAMIN D-3 PO) Stop 7 days before surgery    esomeprazole (NEXIUM) 20 MG capsule Take morning of surgery with sip of water    NUCALA 100 MG/ML Solution Prefilled Syringe Follow instructions from surgeon or specialist.    azelastine (ASTELIN) 137 MCG/SPRAY nasal spray Continue taking as prescribed.    EPINEPHrine (EPIPEN) 0.3 MG/0.3ML Solution Auto-injector solution for injection As needed medication, may take if needed, including morning of procedure     fluticasone (FLONASE) 50 MCG/ACT nasal spray Continue taking as prescribed.    valacyclovir (VALTREX) 1 GM Tab As needed medication, may take if needed, including morning of procedure

## 2025-07-11 ENCOUNTER — HOSPITAL ENCOUNTER (OUTPATIENT)
Facility: MEDICAL CENTER | Age: 55
End: 2025-07-11
Attending: OTOLARYNGOLOGY | Admitting: OTOLARYNGOLOGY
Payer: COMMERCIAL

## 2025-07-11 ENCOUNTER — ANESTHESIA EVENT (OUTPATIENT)
Dept: SURGERY | Facility: MEDICAL CENTER | Age: 55
End: 2025-07-11
Payer: COMMERCIAL

## 2025-07-11 ENCOUNTER — ANESTHESIA (OUTPATIENT)
Dept: SURGERY | Facility: MEDICAL CENTER | Age: 55
End: 2025-07-11
Payer: COMMERCIAL

## 2025-07-11 VITALS
DIASTOLIC BLOOD PRESSURE: 72 MMHG | WEIGHT: 206.35 LBS | TEMPERATURE: 97.5 F | HEIGHT: 67 IN | BODY MASS INDEX: 32.39 KG/M2 | HEART RATE: 50 BPM | OXYGEN SATURATION: 93 % | SYSTOLIC BLOOD PRESSURE: 115 MMHG | RESPIRATION RATE: 18 BRPM

## 2025-07-11 DIAGNOSIS — H61.303 EXTERNAL EAR CANAL STENOSIS, ACQUIRED, BILATERAL: Primary | ICD-10-CM

## 2025-07-11 LAB — PATHOLOGY CONSULT NOTE: NORMAL

## 2025-07-11 PROCEDURE — 700105 HCHG RX REV CODE 258: Performed by: OTOLARYNGOLOGY

## 2025-07-11 PROCEDURE — 160002 HCHG RECOVERY MINUTES (STAT): Performed by: OTOLARYNGOLOGY

## 2025-07-11 PROCEDURE — 700102 HCHG RX REV CODE 250 W/ 637 OVERRIDE(OP): Performed by: STUDENT IN AN ORGANIZED HEALTH CARE EDUCATION/TRAINING PROGRAM

## 2025-07-11 PROCEDURE — 700101 HCHG RX REV CODE 250: Performed by: OTOLARYNGOLOGY

## 2025-07-11 PROCEDURE — 160025 RECOVERY II MINUTES (STATS): Performed by: OTOLARYNGOLOGY

## 2025-07-11 PROCEDURE — 88311 DECALCIFY TISSUE: CPT | Performed by: PATHOLOGY

## 2025-07-11 PROCEDURE — 88311 DECALCIFY TISSUE: CPT | Mod: 26 | Performed by: PATHOLOGY

## 2025-07-11 PROCEDURE — 160193 HCHG PACU STANDARD - 1ST 60 MINS: Performed by: OTOLARYNGOLOGY

## 2025-07-11 PROCEDURE — 88305 TISSUE EXAM BY PATHOLOGIST: CPT | Performed by: PATHOLOGY

## 2025-07-11 PROCEDURE — 700111 HCHG RX REV CODE 636 W/ 250 OVERRIDE (IP): Mod: JZ | Performed by: STUDENT IN AN ORGANIZED HEALTH CARE EDUCATION/TRAINING PROGRAM

## 2025-07-11 PROCEDURE — 160191 HCHG ANESTHESIA STANDARD: Performed by: OTOLARYNGOLOGY

## 2025-07-11 PROCEDURE — 160028 HCHG SURGERY MINUTES - 1ST 30 MINS LEVEL 3: Performed by: OTOLARYNGOLOGY

## 2025-07-11 PROCEDURE — 160039 HCHG SURGERY MINUTES - EA ADDL 1 MIN LEVEL 3: Performed by: OTOLARYNGOLOGY

## 2025-07-11 PROCEDURE — A9270 NON-COVERED ITEM OR SERVICE: HCPCS | Performed by: OTOLARYNGOLOGY

## 2025-07-11 PROCEDURE — 160015 HCHG STAT PREOP MINUTES: Performed by: OTOLARYNGOLOGY

## 2025-07-11 PROCEDURE — A9270 NON-COVERED ITEM OR SERVICE: HCPCS | Performed by: STUDENT IN AN ORGANIZED HEALTH CARE EDUCATION/TRAINING PROGRAM

## 2025-07-11 PROCEDURE — 160046 HCHG PACU - 1ST 60 MINS PHASE II: Performed by: OTOLARYNGOLOGY

## 2025-07-11 PROCEDURE — 700102 HCHG RX REV CODE 250 W/ 637 OVERRIDE(OP): Performed by: OTOLARYNGOLOGY

## 2025-07-11 PROCEDURE — 700101 HCHG RX REV CODE 250: Performed by: STUDENT IN AN ORGANIZED HEALTH CARE EDUCATION/TRAINING PROGRAM

## 2025-07-11 PROCEDURE — 88305 TISSUE EXAM BY PATHOLOGIST: CPT | Mod: 26 | Performed by: PATHOLOGY

## 2025-07-11 PROCEDURE — 160048 HCHG OR STATISTICAL LEVEL 1-5: Performed by: OTOLARYNGOLOGY

## 2025-07-11 RX ORDER — DEXMEDETOMIDINE HYDROCHLORIDE 100 UG/ML
INJECTION, SOLUTION INTRAVENOUS PRN
Status: DISCONTINUED | OUTPATIENT
Start: 2025-07-11 | End: 2025-07-11 | Stop reason: SURG

## 2025-07-11 RX ORDER — MIDAZOLAM HYDROCHLORIDE 1 MG/ML
1 INJECTION INTRAMUSCULAR; INTRAVENOUS
Status: DISCONTINUED | OUTPATIENT
Start: 2025-07-11 | End: 2025-07-11 | Stop reason: HOSPADM

## 2025-07-11 RX ORDER — CEFAZOLIN SODIUM 1 G/3ML
INJECTION, POWDER, FOR SOLUTION INTRAMUSCULAR; INTRAVENOUS PRN
Status: DISCONTINUED | OUTPATIENT
Start: 2025-07-11 | End: 2025-07-11 | Stop reason: SURG

## 2025-07-11 RX ORDER — MIDAZOLAM HYDROCHLORIDE 1 MG/ML
INJECTION INTRAMUSCULAR; INTRAVENOUS PRN
Status: DISCONTINUED | OUTPATIENT
Start: 2025-07-11 | End: 2025-07-11 | Stop reason: SURG

## 2025-07-11 RX ORDER — LIDOCAINE HYDROCHLORIDE 20 MG/ML
INJECTION, SOLUTION EPIDURAL; INFILTRATION; INTRACAUDAL; PERINEURAL PRN
Status: DISCONTINUED | OUTPATIENT
Start: 2025-07-11 | End: 2025-07-11 | Stop reason: SURG

## 2025-07-11 RX ORDER — EPHEDRINE SULFATE 50 MG/ML
5 INJECTION, SOLUTION INTRAVENOUS
Status: DISCONTINUED | OUTPATIENT
Start: 2025-07-11 | End: 2025-07-11 | Stop reason: HOSPADM

## 2025-07-11 RX ORDER — OXYCODONE HCL 5 MG/5 ML
10 SOLUTION, ORAL ORAL
Status: COMPLETED | OUTPATIENT
Start: 2025-07-11 | End: 2025-07-11

## 2025-07-11 RX ORDER — OXYCODONE HCL 5 MG/5 ML
5 SOLUTION, ORAL ORAL
Status: COMPLETED | OUTPATIENT
Start: 2025-07-11 | End: 2025-07-11

## 2025-07-11 RX ORDER — ONDANSETRON 2 MG/ML
INJECTION INTRAMUSCULAR; INTRAVENOUS PRN
Status: DISCONTINUED | OUTPATIENT
Start: 2025-07-11 | End: 2025-07-11 | Stop reason: SURG

## 2025-07-11 RX ORDER — HALOPERIDOL 5 MG/ML
0.25 INJECTION INTRAMUSCULAR
Status: DISCONTINUED | OUTPATIENT
Start: 2025-07-11 | End: 2025-07-11 | Stop reason: HOSPADM

## 2025-07-11 RX ORDER — CEFDINIR 300 MG/1
300 CAPSULE ORAL 2 TIMES DAILY
Qty: 20 CAPSULE | Refills: 0 | Status: SHIPPED | OUTPATIENT
Start: 2025-07-11

## 2025-07-11 RX ORDER — SODIUM CHLORIDE, SODIUM LACTATE, POTASSIUM CHLORIDE, CALCIUM CHLORIDE 600; 310; 30; 20 MG/100ML; MG/100ML; MG/100ML; MG/100ML
INJECTION, SOLUTION INTRAVENOUS CONTINUOUS
Status: ACTIVE | OUTPATIENT
Start: 2025-07-11 | End: 2025-07-11

## 2025-07-11 RX ORDER — DIPHENHYDRAMINE HYDROCHLORIDE 50 MG/ML
12.5 INJECTION, SOLUTION INTRAMUSCULAR; INTRAVENOUS
Status: DISCONTINUED | OUTPATIENT
Start: 2025-07-11 | End: 2025-07-11 | Stop reason: HOSPADM

## 2025-07-11 RX ORDER — HYDRALAZINE HYDROCHLORIDE 20 MG/ML
5 INJECTION INTRAMUSCULAR; INTRAVENOUS
Status: DISCONTINUED | OUTPATIENT
Start: 2025-07-11 | End: 2025-07-11 | Stop reason: HOSPADM

## 2025-07-11 RX ORDER — MAGNESIUM SULFATE HEPTAHYDRATE 40 MG/ML
INJECTION, SOLUTION INTRAVENOUS PRN
Status: DISCONTINUED | OUTPATIENT
Start: 2025-07-11 | End: 2025-07-11 | Stop reason: SURG

## 2025-07-11 RX ORDER — ACETAMINOPHEN AND CODEINE PHOSPHATE 300; 30 MG/1; MG/1
1-2 TABLET ORAL EVERY 4 HOURS PRN
Qty: 28 TABLET | Refills: 0 | Status: SHIPPED | OUTPATIENT
Start: 2025-07-11 | End: 2025-07-18

## 2025-07-11 RX ORDER — LIDOCAINE HYDROCHLORIDE 40 MG/ML
SOLUTION TOPICAL PRN
Status: DISCONTINUED | OUTPATIENT
Start: 2025-07-11 | End: 2025-07-11 | Stop reason: SURG

## 2025-07-11 RX ORDER — DEXAMETHASONE SODIUM PHOSPHATE 4 MG/ML
INJECTION, SOLUTION INTRA-ARTICULAR; INTRALESIONAL; INTRAMUSCULAR; INTRAVENOUS; SOFT TISSUE PRN
Status: DISCONTINUED | OUTPATIENT
Start: 2025-07-11 | End: 2025-07-11 | Stop reason: SURG

## 2025-07-11 RX ORDER — LABETALOL HYDROCHLORIDE 5 MG/ML
5 INJECTION, SOLUTION INTRAVENOUS
Status: DISCONTINUED | OUTPATIENT
Start: 2025-07-11 | End: 2025-07-11 | Stop reason: HOSPADM

## 2025-07-11 RX ORDER — BACITRACIN ZINC 500 [USP'U]/G
OINTMENT TOPICAL
Status: DISCONTINUED | OUTPATIENT
Start: 2025-07-11 | End: 2025-07-11 | Stop reason: HOSPADM

## 2025-07-11 RX ORDER — LIDOCAINE HYDROCHLORIDE AND EPINEPHRINE 10; 10 MG/ML; UG/ML
INJECTION, SOLUTION INFILTRATION; PERINEURAL
Status: DISCONTINUED | OUTPATIENT
Start: 2025-07-11 | End: 2025-07-11 | Stop reason: HOSPADM

## 2025-07-11 RX ORDER — CIPROFLOXACIN HYDROCHLORIDE 3.5 MG/ML
SOLUTION/ DROPS TOPICAL
Status: DISCONTINUED | OUTPATIENT
Start: 2025-07-11 | End: 2025-07-11 | Stop reason: HOSPADM

## 2025-07-11 RX ORDER — IPRATROPIUM BROMIDE AND ALBUTEROL SULFATE 2.5; .5 MG/3ML; MG/3ML
3 SOLUTION RESPIRATORY (INHALATION)
Status: DISCONTINUED | OUTPATIENT
Start: 2025-07-11 | End: 2025-07-11 | Stop reason: HOSPADM

## 2025-07-11 RX ORDER — ONDANSETRON 2 MG/ML
4 INJECTION INTRAMUSCULAR; INTRAVENOUS
Status: DISCONTINUED | OUTPATIENT
Start: 2025-07-11 | End: 2025-07-11 | Stop reason: HOSPADM

## 2025-07-11 RX ADMIN — OXYCODONE HYDROCHLORIDE 5 MG: 5 SOLUTION ORAL at 10:41

## 2025-07-11 RX ADMIN — SUGAMMADEX 200 MG: 100 INJECTION, SOLUTION INTRAVENOUS at 09:33

## 2025-07-11 RX ADMIN — LIDOCAINE HYDROCHLORIDE 100 MG: 20 INJECTION, SOLUTION EPIDURAL; INFILTRATION; INTRACAUDAL; PERINEURAL at 08:11

## 2025-07-11 RX ADMIN — MAGNESIUM SULFATE HEPTAHYDRATE 4 G: 2 INJECTION, SOLUTION INTRAVENOUS at 08:57

## 2025-07-11 RX ADMIN — MIDAZOLAM HYDROCHLORIDE 2 MG: 1 INJECTION, SOLUTION INTRAMUSCULAR; INTRAVENOUS at 08:10

## 2025-07-11 RX ADMIN — FENTANYL CITRATE 50 MCG: 50 INJECTION, SOLUTION INTRAMUSCULAR; INTRAVENOUS at 08:40

## 2025-07-11 RX ADMIN — ONDANSETRON 4 MG: 2 INJECTION INTRAMUSCULAR; INTRAVENOUS at 09:29

## 2025-07-11 RX ADMIN — DEXAMETHASONE SODIUM PHOSPHATE 8 MG: 4 INJECTION INTRA-ARTICULAR; INTRALESIONAL; INTRAMUSCULAR; INTRAVENOUS; SOFT TISSUE at 08:21

## 2025-07-11 RX ADMIN — CEFAZOLIN 2 G: 1 INJECTION, POWDER, FOR SOLUTION INTRAMUSCULAR; INTRAVENOUS at 08:14

## 2025-07-11 RX ADMIN — LIDOCAINE HYDROCHLORIDE 4 ML: 40 SOLUTION TOPICAL at 08:11

## 2025-07-11 RX ADMIN — SODIUM CHLORIDE, POTASSIUM CHLORIDE, SODIUM LACTATE AND CALCIUM CHLORIDE: 600; 310; 30; 20 INJECTION, SOLUTION INTRAVENOUS at 06:48

## 2025-07-11 RX ADMIN — PROPOFOL 180 MG: 10 INJECTION, EMULSION INTRAVENOUS at 08:11

## 2025-07-11 RX ADMIN — DEXMEDETOMIDINE 20 MCG: 100 INJECTION, SOLUTION INTRAVENOUS at 08:10

## 2025-07-11 RX ADMIN — FENTANYL CITRATE 50 MCG: 50 INJECTION, SOLUTION INTRAMUSCULAR; INTRAVENOUS at 08:11

## 2025-07-11 RX ADMIN — KETAMINE HYDROCHLORIDE 50 MG: 50 INJECTION INTRAMUSCULAR; INTRAVENOUS at 09:05

## 2025-07-11 ASSESSMENT — PAIN DESCRIPTION - PAIN TYPE
TYPE: SURGICAL PAIN

## 2025-07-11 ASSESSMENT — FIBROSIS 4 INDEX: FIB4 SCORE: 1.39

## 2025-07-11 ASSESSMENT — PAIN SCALES - GENERAL: PAIN_LEVEL: 1

## 2025-07-11 NOTE — OP REPORT
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     PROCEDURE:  Left ear canalplasty, both soft tissue and bony.     SURGEON:  Gabriella Madrid MD     ANESTHESIOLOGIST:  Eliezer Penny MD     ANESTHESIA:  General.     NARRATIVE:  I met the patient in the preoperative holding area.  We reviewed   risks, benefits, complications, and alternatives.  The patient was taken to   the operating room and placed under satisfactory general anesthesia.  Once she   was there, I prepped and draped in the usual fashion and turned the head of   the bed away from anesthesia to expose the left ear to the inferior part of   the room.  Left to scrub upon returning, the patient was then draped by   myself.  The postauricular area was injected with 1% Xylocaine with   epinephrine 1:200,000.  The postauricular sulcus had been shaved and then   injected by myself.  The canal was injected as well with local.  I then used   electrocautery in the postauricular sulcus to go through the skin and   subcutaneous tissue all the way down to where we would normally harvest the   temporalis fascia flap or a Palva flap.  Once that was completed, I was down   to the external auditory canal.  I then went through the ear canal, identified   the area of the external portion, lateral portion of the ear canal, and made   a #15 blade cut through that and then looked through the back end of the ear   to find out where that was at.  I enlarged that and used electrocautery to   stop any bleeding.  The patient had had a somewhat labile blood pressure and   on multiple occasions, we had had a little bit more bleeding than normal I   think because the pressure was higher.  After that was then completed, I then   put in the self-retaining retractors, flipped the ear forward, and then   dissected the posterior ear  canal down to right above the annulus and then   used a 2-mm cutting jethro to smooth out the posterior ear canal and to provide   more space.  Once that was completed, I then used the berhane jethro to go back   over that same area.  I then took the ear retaining forceps out and was   looking through the ear canal, but there was still not enough tissue, which   brought me back to the posterior ear canal and I defatted the posterior ear   canal in order to provide more room.  It was clear that the external auditory   canal was being protruded into from anteriorly where the TMJ was at, but I did   not touch the anterior portion at all.  I just moved the soft tissue and bony   portion of the ear backwards in order to provide more space.  Once that was   then completed, I then packed just lateral to the drum with Gelfoam and then   brought the Gelfoam up and put in a Merocel packing over the top of the   defatted skin and then sutured the ear back with a deep layer of 4-0 Vicryl   and a superficial layer with 4-0 Monocryl and the external layer with a 4-0   nylon.  The Merocel sponge in the external auditory canal was then soaked with   antibiotic and a TXA solution.  After that was completed, I placed on a   Miguel Angel dressing and the patient was then awakened, extubated and   transferred to the recovery.     ESTIMATED BLOOD LOSS:  Probably about 20 mL.     DRAINS USED:  None.        ______________________________  MD DORIS GODDARD/CIRO    DD:  07/11/2025 09:54  DT:  07/11/2025 10:25    Job#:  [id number]

## 2025-07-11 NOTE — ANESTHESIA PREPROCEDURE EVALUATION
Case: [id number] Date/Time: 07/11/25 0715    Procedure: LEFT EAR OSTEOMA REMOVAL AND CANALOPLASTY    Pre-op diagnosis: BONY DEFECT OF LEFT EAR CANAL, OSTEOMA OF EXTERNAL EAR CANAL    Location: [facility] OR 02 / SURGERY [facility]    Surgeons: Gabriella Madrid M.D.            Relevant Problems   PULMONARY   (positive) Unspecified asthma, uncomplicated      NEURO   (positive) Ocular migraine      CARDIAC   (positive) Ocular migraine      GI   (positive) GERD (gastroesophageal reflux disease)      Other   (positive) Anxiety   (positive) Central sleep apnea       Physical Exam    Airway   Mallampati: III  TM distance: >3 FB  Neck ROM: full       Cardiovascular - normal exam  Rhythm: regular  Rate: normal    (-) murmur     Dental    Pulmonary - normal examBreath sounds clear to auscultation     Abdominal (+) obese     Neurological - normal exam                   Anesthesia Plan    ASA 2       Plan - general       Airway plan will be ETT          Induction: intravenous    Postoperative Plan: Postoperative administration of opioids is intended.    Pertinent diagnostic labs and testing reviewed    Informed Consent:    Anesthetic plan and risks discussed with patient.    Use of blood products discussed with: patient whom consented to blood products.

## 2025-07-11 NOTE — ANESTHESIA PROCEDURE NOTES
Airway    Date/Time: 7/11/2025 8:14 AM    Performed by: Eliezer Penny M.D.  Authorized by: Eliezer Penny M.D.    Location:  OR  Urgency:  Elective  Indications for Airway Management:  Anesthesia      Spontaneous Ventilation: absent    Sedation Level:  Deep  Preoxygenated: Yes    Patient Position:  Sniffing  Mask Difficulty Assessment:  0 - not attempted  Final Airway Type:  Endotracheal airway  Final Endotracheal Airway:  ETT  Cuffed: Yes    Technique Used for Successful ETT Placement:  Direct laryngoscopy    Insertion Site:  Oral  Blade Type:  Ginny  Laryngoscope Blade/Videolaryngoscope Blade Size:  3  ETT Size (mm):  7.5  Measured from:  Teeth  ETT to Teeth (cm):  22  Placement Verified by: auscultation and capnometry    Cormack-Lehane Classification:  Grade IIb - view of arytenoids or posterior of glottis only  Number of Attempts at Approach:  1

## 2025-07-11 NOTE — ANESTHESIA TIME REPORT
Anesthesia Start and Stop Event Times       Date Time Event    7/11/2025 0807 Anesthesia Start     0808 Ready for Procedure     0939 Anesthesia Stop          Responsible Staff  07/11/25      Name Role Begin End    Eliezer Penny M.D. Anesth 0807 0939          Overtime Reason:  no overtime (within assigned shift)    Comments:

## 2025-07-11 NOTE — OR NURSING
0938- Patient arrived to PACU on 6L oxygen mask. Received report from anesthesia and OR nurse. Surgical dressing to left ear CDI.      0953- Patient's  updated via phone call, all questions answered.     1041- Patient reporting 3/10 left ear pain. Medicated per MAR. Denies nausea.     1051- Dr. Madrid at bedside to see patient. Verbal instructions provided to patient by Dr. Madrid regarding antibiotic ear drops.    1055- Education provided on incentive spirometer use. Patient verbalized understanding and able to pull 3000 on IS.     1122- Report given to Miriam RN, all questions answered.     1128- Patient transferred to Stage 2 Recovery area via rFord. All personal belongings transferred with patient.

## 2025-07-11 NOTE — OR NURSING
1130: Report received from Linda LOCO. Pt to Stage 2 from PACU via gurney. Assisted to get dressed by CNA. VSS.     1150: DC instructions reviewed with pt and pt's . All questions answered.    1203: Pt ambulated to the bathroom, able to void.    1208: PIV DC'd by RN. Pt discharged via wheelchair by RN. All belongings with pt.

## 2025-07-11 NOTE — DISCHARGE INSTRUCTIONS
Dr. Madrid's Discharge Instructions:    Leave left ear surgical dressing in place until Sunday July 13th.   Use antibiotic ear drops as directed by Dr. Madrid.      DRIVING:   You may drive whenever you are off pain medications and are able to perform the activities needed to drive, i.e. turning, bending, twisting, wearing a seat belt, etc.    BATHING:   You may get the wound wet at any time after leaving the hospital. You may shower, but do not submerge in a bath or a pool until you after your first postoperative visit.    WOUND CARE:   Leave left ear surgical dressing in place until Sunday July 13th as directed by Dr. Madrid     BOWEL FUNCTION:  Prescription pain medication may cause constipation. If you are having problems, use what you normally would or call your provider for suggestions. It also helps to stay regular by including fiber in your diet (for example: bran or fruits and vegetables) and drink plenty of liquids (water, juice, etc.).    What to Expect Post Anesthesia    Rest and take it easy for the first 24 hours.  A responsible adult is recommended to remain with you during that time.  It is normal to feel sleepy.  We encourage you to not do anything that requires balance, judgment or coordination.    FOR 24 HOURS DO NOT:  Drive, operate machinery or run household appliances.  Drink beer or alcoholic beverages.  Make important decisions or sign legal documents.    To avoid nausea, slowly advance diet as tolerated, avoiding spicy or greasy foods for the first day.  Add more substantial food to your diet according to your provider's instructions.  Babies can be fed formula or breast milk as soon as they are hungry.  INCREASE FLUIDS AND FIBER TO AVOID CONSTIPATION.    MILD FLU-LIKE SYMPTOMS ARE NORMAL.  YOU MAY EXPERIENCE GENERALIZED MUSCLE ACHES, THROAT IRRITATION, HEADACHE AND/OR SOME NAUSEA.    If any questions arise, call your provider.  If your provider is not available, please feel free to call the  [facility] at [phone].    MEDICATIONS: Resume taking daily medication.  Take prescribed pain medication with food.  If no medication is prescribed, you may take non-aspirin pain medication if needed.  PAIN MEDICATION CAN BE VERY CONSTIPATING.  Take a stool softener or laxative such as senokot, pericolace, or milk of magnesia if needed.    Last pain medication Oxycodone 5 mg given at 10:41 AM    Discharge Education for patients on CARI (Obstructive Sleep Apnea) Protocol    Prior to receiving sedation or anesthesia, we screen all patients for Obstructive Sleep Apnea.  During your screening, you were identified as having Obstructive Sleep Apnea(CARI).    What is Obstructive Sleep Apnea?  Sleep apnea (AP-ne-ah) is a common disorder which involves breathing pauses that occur during sleep.  These can last from 10 seconds to a minute or longer.  Normal breathing resumes often with a loud snort or choking sound.    Sleep apnea occurs in all age groups and both genders but is more common in men and people over 40 years of age.  It has been estimated that as many as 18 million Americans have sleep apnea.  Most people who have sleep apnea don’t know they have it because it only occurs during sleep.  A family member and/or bed partner may first notice the signs of sleep apnea.  Sleep apnea is a chronic (ongoing) condition that disrupts the quality and quantity of your sleep repeatedly throughout the night.  This often results in excessive daytime sleepiness or fatigue during the day.  It may also contribute to high blood pressure, heart problems, and complications following medications used for surgery and procedures.    We recommend that you should be with an adult observer for at least 24 hours after your sedation/anesthesia.  If you have a CPAP machine, you should wear it during any sleep period (day or night) for the week following your procedure.  We encourage you to sleep on your side or in a sitting position,  even with napping.  Lying flat on your back increases the risk of apnea and airway obstruction during your post procedure recovery period.    It is important to prevent over-sedation that could increase your risk for apnea.  Please take all pain medication as directed by your physician.  If you are not getting pain relief, please contact your physician to discuss possible approaches to relieving pain while minimizing medications that can affect your breathing and oxygen levels.

## 2025-07-11 NOTE — ANESTHESIA POSTPROCEDURE EVALUATION
Patient: Fartun Fuentes    Procedure Summary       Date: 07/11/25 Room / Location: [place] [address] / SURGERY [facility]    Anesthesia Start: 0807 Anesthesia Stop: 0939    Procedure: LEFT EAR OSTEOMA REMOVAL AND CANALOPLASTY (Left: Ear) Diagnosis: (BONY DEFECT OF LEFT EAR CANAL, OSTEOMA OF EXTERNAL EAR CANAL)    Surgeons: Gabriella Madrid M.D. Responsible Provider: Eliezer Penny M.D.    Anesthesia Type: general ASA Status: 2            Final Anesthesia Type: general  Last vitals  BP   Blood Pressure: 128/78    Temp   36.1 °C (97 °F)    Pulse   (!) 52   Resp   16    SpO2   97 %      Anesthesia Post Evaluation    Patient location during evaluation: PACU  Patient participation: complete - patient participated  Level of consciousness: awake and alert  Pain score: 1    Airway patency: patent  Anesthetic complications: no  Cardiovascular status: hemodynamically stable  Respiratory status: acceptable  Hydration status: euvolemic    PONV: none          Encounter Notable Events   Notable Event Outcome Phase Comment   Equipment missing / unavailable  Intraprocedure Mekhi drill not available.        Nurse Pain Score: 1 (NPRS)

## 2025-07-11 NOTE — OR SURGEON
Immediate Post OP Note    PreOp Diagnosis: Left ear canal obstruction (exostosis and soft tissue)      PostOp Diagnosis: Same      Procedure(s):  LEFT EAR OSTEOMA REMOVAL AND CANALOPLASTY - Wound Class: Clean    Surgeon(s):  Gabriella Madrid M.D.    Anesthesiologist/Type of Anesthesia:  Anesthesiologist: Eliezer Penny M.D./General    Surgical Staff:  Circulator: Judy España Circulator: Jaci Bee R.N.  Scrub Person: Josh Ibarra    Specimens removed if any:  ID Type Source Tests Collected by Time Destination   A : Left ear canal soft tissue Mass Ear PATHOLOGY SPECIMEN Gabriella Madrid M.D. 7/11/2025  9:22 AM        Estimated Blood Loss: <20ml    Findings: Soft tissue and bony obstruction of the left ear canal    Complications: None        7/11/2025 9:45 AM Gabriella Madrid M.D.

## 2025-08-01 ENCOUNTER — PATIENT MESSAGE (OUTPATIENT)
Dept: MEDICAL GROUP | Facility: LAB | Age: 55
End: 2025-08-01
Payer: COMMERCIAL

## 2025-08-01 DIAGNOSIS — Z00.00 PREVENTATIVE HEALTH CARE: Primary | ICD-10-CM

## 2025-08-15 ENCOUNTER — HOSPITAL ENCOUNTER (OUTPATIENT)
Dept: LAB | Facility: MEDICAL CENTER | Age: 55
End: 2025-08-15
Attending: NURSE PRACTITIONER
Payer: COMMERCIAL

## 2025-08-15 DIAGNOSIS — Z00.00 PREVENTATIVE HEALTH CARE: ICD-10-CM

## 2025-08-15 DIAGNOSIS — R74.8 ELEVATED LIVER ENZYMES: ICD-10-CM

## 2025-08-15 LAB
25(OH)D3 SERPL-MCNC: 37 NG/ML (ref 30–100)
ALBUMIN SERPL BCP-MCNC: 4.5 G/DL (ref 3.2–4.9)
ALBUMIN/GLOB SERPL: 1.6 G/DL
ALP SERPL-CCNC: 106 U/L (ref 30–99)
ALT SERPL-CCNC: 97 U/L (ref 2–50)
ANION GAP SERPL CALC-SCNC: 13 MMOL/L (ref 7–16)
AST SERPL-CCNC: 71 U/L (ref 12–45)
BASOPHILS # BLD AUTO: 1.7 % (ref 0–1.8)
BASOPHILS # BLD: 0.12 K/UL (ref 0–0.12)
BILIRUB CONJ SERPL-MCNC: <0.2 MG/DL (ref 0.1–0.5)
BILIRUB INDIRECT SERPL-MCNC: NORMAL MG/DL (ref 0–1)
BILIRUB SERPL-MCNC: 0.3 MG/DL (ref 0.1–1.5)
BUN SERPL-MCNC: 11 MG/DL (ref 8–22)
CALCIUM ALBUM COR SERPL-MCNC: 9.1 MG/DL (ref 8.5–10.5)
CALCIUM SERPL-MCNC: 9.5 MG/DL (ref 8.5–10.5)
CHLORIDE SERPL-SCNC: 109 MMOL/L (ref 96–112)
CHOLEST SERPL-MCNC: 171 MG/DL (ref 100–199)
CO2 SERPL-SCNC: 20 MMOL/L (ref 20–33)
CREAT SERPL-MCNC: 0.81 MG/DL (ref 0.5–1.4)
EOSINOPHIL # BLD AUTO: 0.22 K/UL (ref 0–0.51)
EOSINOPHIL NFR BLD: 3 % (ref 0–6.9)
ERYTHROCYTE [DISTWIDTH] IN BLOOD BY AUTOMATED COUNT: 42.8 FL (ref 35.9–50)
EST. AVERAGE GLUCOSE BLD GHB EST-MCNC: 131 MG/DL
GFR SERPLBLD CREATININE-BSD FMLA CKD-EPI: 85 ML/MIN/1.73 M 2
GLOBULIN SER CALC-MCNC: 2.8 G/DL (ref 1.9–3.5)
GLUCOSE SERPL-MCNC: 134 MG/DL (ref 65–99)
HBA1C MFR BLD: 6.2 % (ref 4–5.6)
HCT VFR BLD AUTO: 48.7 % (ref 37–47)
HDLC SERPL-MCNC: 52 MG/DL
HGB BLD-MCNC: 16.5 G/DL (ref 12–16)
IMM GRANULOCYTES # BLD AUTO: 0.01 K/UL (ref 0–0.11)
IMM GRANULOCYTES NFR BLD AUTO: 0.1 % (ref 0–0.9)
LDLC SERPL CALC-MCNC: 90 MG/DL
LYMPHOCYTES # BLD AUTO: 3.13 K/UL (ref 1–4.8)
LYMPHOCYTES NFR BLD: 43.3 % (ref 22–41)
MCH RBC QN AUTO: 31.3 PG (ref 27–33)
MCHC RBC AUTO-ENTMCNC: 33.9 G/DL (ref 32.2–35.5)
MCV RBC AUTO: 92.4 FL (ref 81.4–97.8)
MONOCYTES # BLD AUTO: 0.67 K/UL (ref 0–0.85)
MONOCYTES NFR BLD AUTO: 9.3 % (ref 0–13.4)
NEUTROPHILS # BLD AUTO: 3.08 K/UL (ref 1.82–7.42)
NEUTROPHILS NFR BLD: 42.6 % (ref 44–72)
NRBC # BLD AUTO: 0 K/UL
NRBC BLD-RTO: 0 /100 WBC (ref 0–0.2)
PLATELET # BLD AUTO: 271 K/UL (ref 164–446)
PMV BLD AUTO: 11.1 FL (ref 9–12.9)
POTASSIUM SERPL-SCNC: 4.1 MMOL/L (ref 3.6–5.5)
PROT SERPL-MCNC: 7.3 G/DL (ref 6–8.2)
RBC # BLD AUTO: 5.27 M/UL (ref 4.2–5.4)
SODIUM SERPL-SCNC: 142 MMOL/L (ref 135–145)
TRIGL SERPL-MCNC: 146 MG/DL (ref 0–149)
TSH SERPL-ACNC: 3.59 UIU/ML (ref 0.38–5.33)
WBC # BLD AUTO: 7.2 K/UL (ref 4.8–10.8)

## 2025-08-15 PROCEDURE — 80053 COMPREHEN METABOLIC PANEL: CPT

## 2025-08-15 PROCEDURE — 80061 LIPID PANEL: CPT

## 2025-08-15 PROCEDURE — 36415 COLL VENOUS BLD VENIPUNCTURE: CPT

## 2025-08-15 PROCEDURE — 82306 VITAMIN D 25 HYDROXY: CPT

## 2025-08-15 PROCEDURE — 82248 BILIRUBIN DIRECT: CPT

## 2025-08-15 PROCEDURE — 83036 HEMOGLOBIN GLYCOSYLATED A1C: CPT

## 2025-08-15 PROCEDURE — 85025 COMPLETE CBC W/AUTO DIFF WBC: CPT

## 2025-08-15 PROCEDURE — 84443 ASSAY THYROID STIM HORMONE: CPT

## 2025-08-16 ENCOUNTER — APPOINTMENT (OUTPATIENT)
Dept: LAB | Facility: MEDICAL CENTER | Age: 55
End: 2025-08-16
Payer: COMMERCIAL

## 2025-08-18 ENCOUNTER — APPOINTMENT (OUTPATIENT)
Dept: MEDICAL GROUP | Facility: LAB | Age: 55
End: 2025-08-18
Payer: COMMERCIAL

## 2025-08-24 SDOH — HEALTH STABILITY: PHYSICAL HEALTH: ON AVERAGE, HOW MANY MINUTES DO YOU ENGAGE IN EXERCISE AT THIS LEVEL?: 40 MIN

## 2025-08-24 SDOH — ECONOMIC STABILITY: FOOD INSECURITY: WITHIN THE PAST 12 MONTHS, THE FOOD YOU BOUGHT JUST DIDN'T LAST AND YOU DIDN'T HAVE MONEY TO GET MORE.: NEVER TRUE

## 2025-08-24 SDOH — ECONOMIC STABILITY: INCOME INSECURITY: HOW HARD IS IT FOR YOU TO PAY FOR THE VERY BASICS LIKE FOOD, HOUSING, MEDICAL CARE, AND HEATING?: NOT HARD AT ALL

## 2025-08-24 SDOH — HEALTH STABILITY: PHYSICAL HEALTH: ON AVERAGE, HOW MANY DAYS PER WEEK DO YOU ENGAGE IN MODERATE TO STRENUOUS EXERCISE (LIKE A BRISK WALK)?: 5 DAYS

## 2025-08-24 SDOH — ECONOMIC STABILITY: INCOME INSECURITY: IN THE LAST 12 MONTHS, WAS THERE A TIME WHEN YOU WERE NOT ABLE TO PAY THE MORTGAGE OR RENT ON TIME?: NO

## 2025-08-24 SDOH — ECONOMIC STABILITY: FOOD INSECURITY: WITHIN THE PAST 12 MONTHS, YOU WORRIED THAT YOUR FOOD WOULD RUN OUT BEFORE YOU GOT MONEY TO BUY MORE.: NEVER TRUE

## 2025-08-24 ASSESSMENT — LIFESTYLE VARIABLES
AUDIT-C TOTAL SCORE: 3
SKIP TO QUESTIONS 9-10: 1
HOW MANY STANDARD DRINKS CONTAINING ALCOHOL DO YOU HAVE ON A TYPICAL DAY: 1 OR 2
HOW OFTEN DO YOU HAVE A DRINK CONTAINING ALCOHOL: 2-3 TIMES A WEEK
HOW OFTEN DO YOU HAVE SIX OR MORE DRINKS ON ONE OCCASION: NEVER

## 2025-08-24 ASSESSMENT — SOCIAL DETERMINANTS OF HEALTH (SDOH)
HOW OFTEN DO YOU GET TOGETHER WITH FRIENDS OR RELATIVES?: MORE THAN THREE TIMES A WEEK
HOW HARD IS IT FOR YOU TO PAY FOR THE VERY BASICS LIKE FOOD, HOUSING, MEDICAL CARE, AND HEATING?: NOT HARD AT ALL
HOW OFTEN DO YOU ATTENT MEETINGS OF THE CLUB OR ORGANIZATION YOU BELONG TO?: MORE THAN 4 TIMES PER YEAR
IN A TYPICAL WEEK, HOW MANY TIMES DO YOU TALK ON THE PHONE WITH FAMILY, FRIENDS, OR NEIGHBORS?: MORE THAN THREE TIMES A WEEK
HOW OFTEN DO YOU HAVE SIX OR MORE DRINKS ON ONE OCCASION: NEVER
HOW OFTEN DO YOU ATTEND CHURCH OR RELIGIOUS SERVICES?: PATIENT DECLINED
HOW OFTEN DO YOU ATTENT MEETINGS OF THE CLUB OR ORGANIZATION YOU BELONG TO?: MORE THAN 4 TIMES PER YEAR
IN A TYPICAL WEEK, HOW MANY TIMES DO YOU TALK ON THE PHONE WITH FAMILY, FRIENDS, OR NEIGHBORS?: MORE THAN THREE TIMES A WEEK
DO YOU BELONG TO ANY CLUBS OR ORGANIZATIONS SUCH AS CHURCH GROUPS UNIONS, FRATERNAL OR ATHLETIC GROUPS, OR SCHOOL GROUPS?: YES
DO YOU BELONG TO ANY CLUBS OR ORGANIZATIONS SUCH AS CHURCH GROUPS UNIONS, FRATERNAL OR ATHLETIC GROUPS, OR SCHOOL GROUPS?: YES
HOW OFTEN DO YOU GET TOGETHER WITH FRIENDS OR RELATIVES?: MORE THAN THREE TIMES A WEEK
WITHIN THE PAST 12 MONTHS, YOU WORRIED THAT YOUR FOOD WOULD RUN OUT BEFORE YOU GOT THE MONEY TO BUY MORE: NEVER TRUE
HOW OFTEN DO YOU ATTEND CHURCH OR RELIGIOUS SERVICES?: PATIENT DECLINED
IN THE PAST 12 MONTHS, HAS THE ELECTRIC, GAS, OIL, OR WATER COMPANY THREATENED TO SHUT OFF SERVICE IN YOUR HOME?: NO
HOW OFTEN DO YOU HAVE A DRINK CONTAINING ALCOHOL: 2-3 TIMES A WEEK
HOW MANY DRINKS CONTAINING ALCOHOL DO YOU HAVE ON A TYPICAL DAY WHEN YOU ARE DRINKING: 1 OR 2

## 2025-08-25 DIAGNOSIS — M25.50 MULTIPLE JOINT PAIN: ICD-10-CM

## 2025-08-27 ENCOUNTER — APPOINTMENT (OUTPATIENT)
Dept: MEDICAL GROUP | Facility: LAB | Age: 55
End: 2025-08-27
Payer: COMMERCIAL

## 2025-08-27 PROBLEM — I25.10 CORONARY ARTERY CALCIFICATION: Status: ACTIVE | Noted: 2025-08-27

## 2025-08-27 PROBLEM — R73.03 PREDIABETES: Status: ACTIVE | Noted: 2025-08-27

## 2025-08-27 PROBLEM — R74.8 ELEVATED LIVER ENZYMES: Status: ACTIVE | Noted: 2025-08-27

## 2025-08-27 ASSESSMENT — LIFESTYLE VARIABLES
HOW MANY STANDARD DRINKS CONTAINING ALCOHOL DO YOU HAVE ON A TYPICAL DAY: 1 OR 2
HOW OFTEN DO YOU HAVE SIX OR MORE DRINKS ON ONE OCCASION: WEEKLY
SKIP TO QUESTIONS 9-10: 0
HOW OFTEN DO YOU HAVE A DRINK CONTAINING ALCOHOL: 2-3 TIMES A WEEK
AUDIT-C TOTAL SCORE: 6

## 2025-08-27 ASSESSMENT — PATIENT HEALTH QUESTIONNAIRE - PHQ9: CLINICAL INTERPRETATION OF PHQ2 SCORE: 0

## 2025-08-27 ASSESSMENT — FIBROSIS 4 INDEX: FIB4 SCORE: 1.49

## 2025-08-28 ENCOUNTER — APPOINTMENT (OUTPATIENT)
Dept: MEDICAL GROUP | Facility: LAB | Age: 55
End: 2025-08-28
Payer: COMMERCIAL

## (undated) DEVICE — BOVIE NEEDLE TIP INSULATD NON-SAFETY 2CM (50/PK)

## (undated) DEVICE — GOWN SURGEONS LARGE - (32/CA)

## (undated) DEVICE — ELECTRODE DUAL RETURN W/ CORD - (50/PK)

## (undated) DEVICE — BLADE BEAVER 7200 (ENT) - 60 ANGLE (3EA/SP)

## (undated) DEVICE — TUBE SUCTION YANKAUER  1/4 X 6FT (20EA/CA)"

## (undated) DEVICE — ELECTRODE 850 FOAM ADHESIVE - HYDROGEL RADIOTRNSPRNT (50/PK)

## (undated) DEVICE — NEEDLE 11GA FOR KYPHOPLASTY

## (undated) DEVICE — SET LEADWIRE 5 LEAD BEDSIDE DISPOSABLE ECG (1SET OF 5/EA)

## (undated) DEVICE — SPEAR EYE SPNG 3ANG MLBL HNDL - (10/ST18ST/PK 180/PK 1PK/SP)

## (undated) DEVICE — SPONGE GAUZE STER 4X4 8-PL - (2/PK 50PK/BX 12BX/CS)

## (undated) DEVICE — BLADE SURGICAL #15 - (50/BX 3BX/CA)

## (undated) DEVICE — DRAPE MICROSCOPE 46 X 80 - 10/CA

## (undated) DEVICE — CAPTIVATOR II-10MM ROUND STIFF

## (undated) DEVICE — TUBE E-T HI-LO CUFF 7.0MM (10EA/PK)

## (undated) DEVICE — BOVIE BLADE COATED &INSULATED - 25/PK

## (undated) DEVICE — DRAPE U SPLIT IMP 54 X 76 - (24/CA)

## (undated) DEVICE — SYRINGE SAFETY 5 ML 18 GA X 1-1/2 BLUNT LL (100/BX 4BX/CA)

## (undated) DEVICE — NEEDLE NON SAFETY 25 GA X 1 1/2 IN HYPO (100EA/BX)

## (undated) DEVICE — TOWEL STOP TIMEOUT SAFETY FLAG (40EA/CA)

## (undated) DEVICE — CANISTER SUCTION RIGID RED 1500CC (40EA/CA)

## (undated) DEVICE — CATHETER IV 14 GA X 2 ---SURG.& SDS ONLY---(200EA/CA)

## (undated) DEVICE — GLOVE BIOGEL SZ 6.5 SURGICAL PF LTX (50PR/BX 4BX/CA)

## (undated) DEVICE — SENSOR SPO2 ADULT LNCS ADTX (20/BX) ORDER ITEM #19593

## (undated) DEVICE — TUBING CLEARLINK DUO-VENT - C-FLO (48EA/CA)

## (undated) DEVICE — LACTATED RINGERS INJ 1000 ML - (14EA/CA 60CA/PF)

## (undated) DEVICE — PACK MINOR BASIN - (4EA/CA)

## (undated) DEVICE — KIT  I.V. START (100EA/CA)

## (undated) DEVICE — CATHETER IV SAFETY 20 GA X 1-1/4 (50/BX)

## (undated) DEVICE — DRAPE SURGICAL U 77X120 - (10/CA)

## (undated) DEVICE — SYRINGE SAFETY TB 1 ML 27 GA X 1/2 IN (100/BX 4BX/CA)

## (undated) DEVICE — BANDAGE ELASTIC 6 HONEYCOMB - 6X5YD LF (20/CA)"

## (undated) DEVICE — CANNULA W/ SUPPLY TUBING O2 - (50/CA)

## (undated) DEVICE — DRAPE IOBAN II INCISE 23X17 - (10EA/BX 4BX/CA)

## (undated) DEVICE — SYRINGE 10 ML CONTROL LL (25EA/BX 4BX/CA)

## (undated) DEVICE — NEEDLE NON SAFETY 27GA X 1-1/4 IN HYPO (100EA/BX)

## (undated) DEVICE — GLOVE BIOGEL PI INDICATOR SZ 7.0 SURGICAL PF LF - (50/BX 4BX/CA)

## (undated) DEVICE — CHLORAPREP 26 ML APPLICATOR - ORANGE TINT(25/CA)

## (undated) DEVICE — DRAPE LG. APERTURE 33 X 51" - (10EA/BX)"

## (undated) DEVICE — SET EXTENSION WITH 2 PORTS (48EA/CA) ***PART #2C8610 IS A SUBSTITUTE*****

## (undated) DEVICE — SUTURE 3-0 SILK 12 X 18 IN - (36/BX)

## (undated) DEVICE — STOCKINET BIAS 6 IN STERILE - (20/CA)

## (undated) DEVICE — GOWN SURGEONS X-LARGE - DISP. (30/CA)

## (undated) DEVICE — COVER LIGHT HANDLE ALC PLUS DISP (18EA/BX)

## (undated) DEVICE — SPONGE PEANUT - (5/PK 50PK/CA)

## (undated) DEVICE — SODIUM CHL IRRIGATION 0.9% 1000ML (12EA/CA)

## (undated) DEVICE — SURGIFOAM (SIZE 100) - (6EA/CA)

## (undated) DEVICE — BALL COTTON STERILE 5/PK - (5/PK 25PK/CA)

## (undated) DEVICE — GLOVE, LITE (PAIR)

## (undated) DEVICE — STOCKINETTE, TUBULAR 4 COTTON

## (undated) DEVICE — SUTURE GENERAL

## (undated) DEVICE — STOCKINET TUBULAR 6IN STERILE - 6 X 48YDS (25/CA)

## (undated) DEVICE — SUTURE 2-0 VICRYL PLUS SH - 8 X 18 INCH (12/BX)

## (undated) DEVICE — SPONGE GAUZE NON-STERILE 4X4 - (2000/CA 10PK/CA)

## (undated) DEVICE — DRAPE C-ARM LARGE 41IN X 74 IN - (10/BX 2BX/CA)

## (undated) DEVICE — SUTURE 4-0 VICRYL PLUS FS-1 - 27 INCH (36/BX)

## (undated) DEVICE — GOWN SURGICAL XX-LARGE - (28EA/CA) SIRUS NON REINFORCED

## (undated) DEVICE — DRESSING TEGADERM 8 X 12 - (10/BX 8BX/CA)

## (undated) DEVICE — DRAPE STRLE REG TOWEL 18X24 - (10/BX 4BX/CA)"

## (undated) DEVICE — GLOVE BIOGEL PI INDICATOR SZ 8.0 SURGICAL PF LF -(50/BX 4BX/CA)

## (undated) DEVICE — NEPTUNE 4 PORT MANIFOLD - (20/PK)

## (undated) DEVICE — BITE BLOCK ADULT 60FR (100EA/CA)

## (undated) DEVICE — GLOVE BIOGEL PI INDICATOR SZ 7.5 SURGICAL PF LF -(50/BX 4BX/CA)

## (undated) DEVICE — COVER LIGHT HANDLE FLEXIBLE - SOFT (2EA/PK 80PK/CA)

## (undated) DEVICE — DRAPE U ORTHOPEDIC - (10/BX)

## (undated) DEVICE — CANISTER SUCTION 3000ML MECHANICAL FILTER AUTO SHUTOFF MEDI-VAC NONSTERILE LF DISP (40EA/CA)

## (undated) DEVICE — GLOVE SZ 7.5 BIOGEL PI MICRO - PF LF (50PR/BX)

## (undated) DEVICE — BOVIE BLADE COATED - (50/PK)

## (undated) DEVICE — SUTURE 1 VICRYL PLUS CTX - 36 INCH (36/BX)

## (undated) DEVICE — DERMABOND ADVANCED - (12EA/BX)

## (undated) DEVICE — GOWN WARMING STANDARD FLEX - (30/CA)

## (undated) DEVICE — SUCTION INSTRUMENT YANKAUER BULBOUS TIP W/O VENT (50EA/CA)

## (undated) DEVICE — GLOVE BIOGEL PI ULTRATOUCH SZ 7.5 SURGICAL PF LF -(50/BX 4BX/CA)

## (undated) DEVICE — TUBE CONNECTING SUCTION - CLEAR PLASTIC STERILE 72 IN (50EA/CA)

## (undated) DEVICE — SENSOR OXIMETER ADULT SPO2 RD SET (20EA/BX)

## (undated) DEVICE — SUTURE 3-0 MONOCRYL SH (36PK/BX)

## (undated) DEVICE — PACK LOWER EXTREMITY - (2/CA)

## (undated) DEVICE — GLOVE BIOGEL SZ 8 SURGICAL PF LTX - (50PR/BX 4BX/CA)

## (undated) DEVICE — DRAPE C ARMOR (12EA/CA)

## (undated) DEVICE — SLEEVE VASO DVT COMPRESSION CALF MED - (10PR/CA)

## (undated) DEVICE — DRAPE LARGE 3 QUARTER - (20/CA)

## (undated) DEVICE — SYRINGE DISP. 50CC LS - (40/BX)

## (undated) DEVICE — PACK MAJOR ORTHO - (2EA/CA)

## (undated) DEVICE — DRESSING POST OP BORDER 4 X 10 (5EA/BX)

## (undated) DEVICE — Device